# Patient Record
Sex: FEMALE | Race: BLACK OR AFRICAN AMERICAN | Employment: PART TIME | URBAN - METROPOLITAN AREA
[De-identification: names, ages, dates, MRNs, and addresses within clinical notes are randomized per-mention and may not be internally consistent; named-entity substitution may affect disease eponyms.]

---

## 2017-02-19 ENCOUNTER — HOSPITAL ENCOUNTER (EMERGENCY)
Age: 27
Discharge: HOME | End: 2017-02-19
Attending: EMERGENCY MEDICINE
Payer: COMMERCIAL

## 2017-02-19 VITALS
OXYGEN SATURATION: 99 % | DIASTOLIC BLOOD PRESSURE: 74 MMHG | WEIGHT: 234 LBS | SYSTOLIC BLOOD PRESSURE: 131 MMHG | HEART RATE: 94 BPM | RESPIRATION RATE: 16 BRPM | HEIGHT: 67 IN | BODY MASS INDEX: 36.73 KG/M2 | TEMPERATURE: 97.7 F

## 2017-02-19 DIAGNOSIS — J06.9 ACUTE UPPER RESPIRATORY INFECTION: Primary | ICD-10-CM

## 2017-02-19 DIAGNOSIS — Z34.90 PREGNANCY, UNSPECIFIED GESTATIONAL AGE: ICD-10-CM

## 2017-02-19 LAB
FLUAV AG NPH QL IA: NEGATIVE
FLUBV AG NOSE QL IA: NEGATIVE

## 2017-02-19 PROCEDURE — 87804 INFLUENZA ASSAY W/OPTIC: CPT | Performed by: EMERGENCY MEDICINE

## 2017-02-19 PROCEDURE — 99282 EMERGENCY DEPT VISIT SF MDM: CPT

## 2017-02-19 RX ORDER — DIPHENHYDRAMINE HCL 25 MG
50 CAPSULE ORAL
Qty: 30 CAP | Refills: 0 | Status: SHIPPED | OUTPATIENT
Start: 2017-02-19 | End: 2019-01-26

## 2017-02-19 RX ORDER — ACETAMINOPHEN 325 MG/1
650 TABLET ORAL
Qty: 20 TAB | Refills: 0 | Status: SHIPPED | OUTPATIENT
Start: 2017-02-19 | End: 2017-07-24

## 2017-02-19 NOTE — ED NOTES
Emergency Department Nursing Plan of Care       The Nursing Plan of Care is developed from the Nursing assessment and Emergency Department Attending provider initial evaluation. The plan of care may be reviewed in the ED Provider note.     The Plan of Care was developed with the following considerations:   Patient / Family readiness to learn indicated by:verbalized understanding  Persons(s) to be included in education: patient  Barriers to Learning/Limitations:No    Signed     Magali Marrero RN    2/19/2017   10:13 AM

## 2017-02-19 NOTE — ED NOTES
Discharge instructions were given to the patient by Nanda Koch RN. Patient was given 2 prescriptions and was encouraged to call or return to the ED for worsening issues or problems and was encouraged to schedule a follow up appointment for continuing care. Patient given a current medication reconciliation form and verbalized understanding of their medications and importance of discussing medications at follow-up. The patient verbalized understanding of discharge instructions and prescriptions, all questions were answered. The patient has no further concerns at this time. Patient stable at time of discharge. The patient left the Emergency Department ambulatory, with friend, and in no acute distress. Patient declined wheelchair transport out of Emergency Department.

## 2017-02-19 NOTE — ED PROVIDER NOTES
Patient is a 27 y.o. female presenting with nasal congestion. The history is provided by the patient and medical records. No  was used. Nasal Congestion   This is a new problem. The current episode started more than 2 days ago. The problem occurs constantly. The problem has not changed since onset. Pertinent negatives include no chest pain, no abdominal pain, no headaches and no shortness of breath. The symptoms are aggravated by coughing. Nothing relieves the symptoms. She has tried a warm compress for the symptoms. Pt 4 months pregnant with persistent URI sx, congestion, cough, body aches. No fever. Past Medical History:   Diagnosis Date    Cancer ([facility] [address]      ovarian     Hypertension      with pregnancy    Ill-defined condition      MS    Ill-defined condition      TIMOTHY 1 with last pap       History reviewed. No pertinent past surgical history. History reviewed. No pertinent family history. Social History     Social History    Marital status: SINGLE     Spouse name: N/A    Number of children: N/A    Years of education: N/A     Occupational History    Not on file. Social History Main Topics    Smoking status: Current Some Day Smoker    Smokeless tobacco: Not on file      Comment: Black and milds when she drinks    Alcohol use Yes      Comment: occ    Drug use: No    Sexual activity: Yes     Partners: Male     Birth control/ protection: None     Other Topics Concern    Not on file     Social History Narrative         ALLERGIES: Shellfish derived    Review of Systems   HENT: Positive for congestion. Respiratory: Positive for cough. Negative for shortness of breath. Cardiovascular: Negative for chest pain. Gastrointestinal: Negative for abdominal pain. Musculoskeletal: Positive for back pain. Neurological: Negative for headaches. All other systems reviewed and are negative.       Vitals:    02/19/17 1009   BP: 135/77   Pulse: 97   Resp: 16   Temp: 97.7 °F (36.5 °C)   SpO2: 99%   Weight: 106.1 kg (234 lb)   Height: 5' 7\" (1.702 m)            Physical Exam   Constitutional: She is oriented to person, place, and time. She appears well-developed and well-nourished. No distress. Nontoxic Black female   HENT:   Head: Normocephalic and atraumatic. Right Ear: External ear normal.   Left Ear: External ear normal.   Mouth/Throat: Oropharynx is clear and moist. No oropharyngeal exudate. Nasal congestion   Eyes: Conjunctivae and EOM are normal. Pupils are equal, round, and reactive to light. Right eye exhibits no discharge. Left eye exhibits no discharge. No scleral icterus. Neck: Normal range of motion. Neck supple. No JVD present. No tracheal deviation present. No thyromegaly present. Cardiovascular: Normal rate, regular rhythm and normal heart sounds. No murmur heard. Pulmonary/Chest: Effort normal and breath sounds normal. No stridor. No respiratory distress. She has no wheezes. She has no rales. Abdominal: Soft. There is no tenderness. Gravid     Musculoskeletal: Normal range of motion. She exhibits no edema or tenderness. Lymphadenopathy:     She has no cervical adenopathy. Neurological: She is alert and oriented to person, place, and time. Skin: Skin is warm and dry. No rash noted. She is not diaphoretic. No erythema. No pallor. Psychiatric: She has a normal mood and affect. Her behavior is normal.   Nursing note and vitals reviewed.        [facility]  ED Course       Procedures

## 2017-02-19 NOTE — DISCHARGE INSTRUCTIONS
Upper Respiratory Infection: After Your Visit to the Emergency Room  Your Care Instructions  You were seen in the emergency room for an upper respiratory infection (URI). This is an infection of the nose, sinuses, or throat. Viruses or bacteria can cause URIs. Colds, flu, and sinusitis are examples of URIs. These infections are spread by coughs, sneezes, and close contact with people who have a URI. Your doctor may have given you antibiotics to treat the infection if it was caused by bacteria. But antibiotics will not help a viral infection. You can treat most infections with home care. This may include drinking lots of fluids and taking over-the-counter medicine for your symptoms. Even though you have been released from the emergency room, you still need to watch for any problems. The doctor carefully checked you. But sometimes problems can develop later. If you have new symptoms, or if your symptoms do not get better, return to the emergency room or call your doctor right away. A visit to the emergency room is only one step in your treatment. Even if you feel better, you still need to do what your doctor recommends, such as going to all suggested follow-up appointments and taking medicines exactly as directed. This will help you recover and help prevent future problems. How can you care for yourself at home? · To prevent dehydration, drink plenty of fluids, enough so that your urine is light yellow or clear like water. Choose water and other caffeine-free clear liquids until you feel better. If you have kidney, heart, or liver disease and have to limit fluids, talk with your doctor before you increase the amount of fluids you drink. · Take acetaminophen (Tylenol) or ibuprofen (Advil, Motrin) for fever or pain. Read and follow all instructions on the label. · If your doctor prescribed antibiotics, take them as directed. Do not stop taking them just because you feel better.  You need to take the full course of antibiotics. · Take cough medicine and a decongestant if your doctor suggests it. · Get plenty of rest.  · Use saline (saltwater) nasal washes to help keep your nasal passages open and wash out mucus and bacteria. You can buy saline nose drops at a grocery store or drugstore. Or you can make your own at home by adding 1 teaspoon of salt and 1 teaspoon of baking soda to 2 cups of distilled water. If you make your own, fill a bulb syringe with the solution, insert the tip into your nostril, and squeeze gently. Helon Cindy your nose. · Use a vaporizer or humidifier to add moisture to the air in your bedroom. Follow the instructions for cleaning it. · Do not smoke or allow others to smoke around you. If you need help quitting, talk to your doctor about stop-smoking programs and medicines. These can increase your chances of quitting for good. When should you call for help? Call 911 if:  · You have severe trouble breathing. Return to the emergency room now if:  · You have a fever with stiff neck or a severe headache. · You have signs of needing more fluids. You have sunken eyes, a dry mouth, and pass only a little dark urine. · You cannot keep down fluids or medicine. Call your doctor today if:  · You have a deep cough and a lot of mucus. · You are too tired to eat or drink. · You have a new symptom, such as a sore throat, an earache, or a rash. Where can you learn more? Go to Zawatt.be  Enter M448 in the search box to learn more about \"Upper Respiratory Infection: After Your Visit to the Emergency Room. \"   © 0699-7214 Healthwise, [employer]. Care instructions adapted under license by [facility] (which disclaims liability or warranty for this information).  This care instruction is for use with your licensed healthcare professional. If you have questions about a medical condition or this instruction, always ask your healthcare professional. [address] any warranty or liability for your use of this information.   Content Version: 9.3.86315; Last Revised: February 13, 2012

## 2017-04-11 ENCOUNTER — HOSPITAL ENCOUNTER (EMERGENCY)
Age: 27
Discharge: HOME | End: 2017-04-11
Attending: EMERGENCY MEDICINE
Payer: COMMERCIAL

## 2017-04-11 VITALS
WEIGHT: 243 LBS | DIASTOLIC BLOOD PRESSURE: 75 MMHG | SYSTOLIC BLOOD PRESSURE: 129 MMHG | BODY MASS INDEX: 38.14 KG/M2 | HEIGHT: 67 IN | HEART RATE: 93 BPM | RESPIRATION RATE: 22 BRPM | OXYGEN SATURATION: 99 %

## 2017-04-11 DIAGNOSIS — O23.42 UTI IN PREGNANCY, SECOND TRIMESTER: Primary | ICD-10-CM

## 2017-04-11 LAB
APPEARANCE UR: ABNORMAL
BACTERIA URNS QL MICRO: ABNORMAL /HPF
BILIRUB UR QL: NEGATIVE
COLOR UR: ABNORMAL
EPITH CASTS URNS QL MICRO: ABNORMAL /LPF
GLUCOSE UR STRIP.AUTO-MCNC: NEGATIVE MG/DL
HCG UR QL: POSITIVE
HGB UR QL STRIP: ABNORMAL
KETONES UR QL STRIP.AUTO: NEGATIVE MG/DL
LEUKOCYTE ESTERASE UR QL STRIP.AUTO: ABNORMAL
NITRITE UR QL STRIP.AUTO: NEGATIVE
PH UR STRIP: 7.5 [PH] (ref 5–8)
PROT UR STRIP-MCNC: 100 MG/DL
RBC #/AREA URNS HPF: ABNORMAL /HPF (ref 0–5)
SP GR UR REFRACTOMETRY: 1.01 (ref 1–1.03)
UA: UC IF INDICATED,UAUC: ABNORMAL
UROBILINOGEN UR QL STRIP.AUTO: 0.2 EU/DL (ref 0.2–1)
WBC URNS QL MICRO: ABNORMAL /HPF (ref 0–4)

## 2017-04-11 PROCEDURE — 87086 URINE CULTURE/COLONY COUNT: CPT | Performed by: EMERGENCY MEDICINE

## 2017-04-11 PROCEDURE — 81001 URINALYSIS AUTO W/SCOPE: CPT | Performed by: EMERGENCY MEDICINE

## 2017-04-11 PROCEDURE — 81025 URINE PREGNANCY TEST: CPT

## 2017-04-11 PROCEDURE — 99284 EMERGENCY DEPT VISIT MOD MDM: CPT

## 2017-04-11 PROCEDURE — 87077 CULTURE AEROBIC IDENTIFY: CPT | Performed by: EMERGENCY MEDICINE

## 2017-04-11 PROCEDURE — 87186 SC STD MICRODIL/AGAR DIL: CPT | Performed by: EMERGENCY MEDICINE

## 2017-04-11 RX ORDER — CEPHALEXIN 500 MG/1
500 CAPSULE ORAL 2 TIMES DAILY
Qty: 14 CAP | Refills: 0 | Status: SHIPPED | OUTPATIENT
Start: 2017-04-11 | End: 2017-04-18

## 2017-04-11 RX ORDER — BISMUTH SUBSALICYLATE 262 MG
1 TABLET,CHEWABLE ORAL DAILY
Status: ON HOLD | COMMUNITY
End: 2019-06-13

## 2017-04-11 RX ORDER — LANOLIN ALCOHOL/MO/W.PET/CERES
CREAM (GRAM) TOPICAL
Status: ON HOLD | COMMUNITY
End: 2019-06-13

## 2017-04-11 NOTE — ED PROVIDER NOTES
Patient is a 27 y.o. female presenting with abdominal pain. The history is provided by the patient and medical records. No  was used. Abdominal Pain    This is a new problem. The current episode started yesterday. The problem has not changed since onset. The pain is mild. Associated symptoms include frequency. Nothing worsens the pain. 32 week pregnant female, pt of Dr London Araujo, to deliver at [facility]. Previous pregnancy 4 weeks early. Pt states she began having suprapubic and low back cramping in 5 minute intervals last night. Eased up today but having frequency, hesitancy, urgency. \"Came here because it was closer. \"    Past Medical History:   Diagnosis Date    Cancer ([facility] [address]     ovarian     Hypertension     with pregnancy    Ill-defined condition     MS    Ill-defined condition     TIMOTHY 1 with last pap       History reviewed. No pertinent surgical history. History reviewed. No pertinent family history. Social History     Social History    Marital status: SINGLE     Spouse name: N/A    Number of children: N/A    Years of education: N/A     Occupational History    Not on file. Social History Main Topics    Smoking status: Current Some Day Smoker    Smokeless tobacco: Not on file      Comment: not since being pregnant    Alcohol use No      Comment: occ    Drug use: No    Sexual activity: Yes     Partners: Male     Birth control/ protection: None     Other Topics Concern    Not on file     Social History Narrative         ALLERGIES: Shellfish derived    Review of Systems   Gastrointestinal: Positive for abdominal pain. Genitourinary: Positive for frequency and urgency. All other systems reviewed and are negative. Vitals:    04/11/17 0658   BP: 129/75   Pulse: 93   Resp: 22   SpO2: 99%   Weight: 110.2 kg (243 lb)   Height: 5' 7\" (1.702 m)            Physical Exam   Constitutional: She is oriented to person, place, and time.  She appears well-developed and well-nourished. [de-identified] Black female here with male    HENT:   Head: Normocephalic and atraumatic. Nose: Nose normal.   Mouth/Throat: Oropharynx is clear and moist. No oropharyngeal exudate. Eyes: Conjunctivae and EOM are normal. Pupils are equal, round, and reactive to light. Right eye exhibits no discharge. Left eye exhibits no discharge. No scleral icterus. Neck: Normal range of motion. Neck supple. Cardiovascular: Normal rate, regular rhythm and normal heart sounds. Pulmonary/Chest: Effort normal. No respiratory distress. Abdominal: Soft. Bowel sounds are normal. She exhibits no distension. There is no tenderness. Gravid, tender suprapubic   Genitourinary:   Genitourinary Comments: Cervix thick, closed   Musculoskeletal: Normal range of motion. Neurological: She is alert and oriented to person, place, and time. Skin: Skin is warm and dry. She is not diaphoretic. Psychiatric: She has a normal mood and affect. Her behavior is normal.   Nursing note and vitals reviewed.        MDM  ED Course       Procedures

## 2017-04-11 NOTE — ED NOTES
....Discharge summary and discharge medications reviewed with patient and spouse and appropriate educational materials and side effects teaching were provided. patient  Given 1 paper prescriptions and 0 electronic prescriptions sent to pt's listed pharmacy. Patient (s) verbalized understanding of the importance of discussing medications with his or her physician or clinic they will be following up with. No si/s of acute distress prior to discharge. Patient offered wheelchair from treatment area to hospital entrance, patient refused wheelchair. Pt reported 8/10 abd and back pain. No other pt complaints. Pt discharged with pt's significant other.

## 2017-04-11 NOTE — ED NOTES
..  Emergency Department Nursing Plan of Care       The Nursing Plan of Care is developed from the Nursing assessment and Emergency Department Attending provider initial evaluation. The plan of care may be reviewed in the ED Provider note. The Plan of Care was developed with the following considerations:   Patient / Family readiness to learn indicated by:verbalized understanding and appropriate questions asked  Persons(s) to be included in education: patient  Barriers to Learning/Limitations:No    Signed     Grey Jones RN    4/11/2017   7:25 AM    .. Patient verbalized name and date of birth. Name and date of birth compared to chart to validate information. Patient verbalized that his/her armband contains the correct spelling of name and date of birth.

## 2017-04-11 NOTE — DISCHARGE INSTRUCTIONS
Belly Pain in Pregnancy: Care Instructions  Your Care Instructions  When you're pregnant, any belly pain can be a worry. You may not want to call your doctor about every pain you have. But you don't want to miss something that is dangerous for you or your baby. Even if it feels familiar, belly pain can mean something new when you're pregnant. It's important to know when to call your doctor. It will also help to know how to care for yourself at home when your pain is not caused by anything harmful. · When belly pain is more severe or constant, see a doctor right away. · If you're sure your belly pain is a sign of labor, call your doctor. · When belly pain is brief, it's usually a normal part of pregnancy. It might be related to changes in the growing uterus. Or it could be the stretching of ligaments called round ligaments. These ligaments help support the uterus. Round ligament pain can be on either side of your belly. It can also be felt in your hips or groin. Follow-up care is a key part of your treatment and safety. Be sure to make and go to all appointments, and call your doctor if you are having problems. It's also a good idea to know your test results and keep a list of the medicines you take. How can you tell if belly pain is a sign of labor? When belly pain is caused by labor, it can feel like mild or menstrual-like cramps in your lower belly. These cramps are probably contractions. They can happen in your second or third trimester. You may also have:  · A steady, dull ache in your lower back, pelvis, or thighs. · A feeling of pressure in your pelvis or lower belly. · Changes in your vaginal discharge or a sudden release of fluid from the vagina. If you think you are in labor, call your doctor. How can you care for yourself at home? When belly pain is mild and is not a symptom of labor:  · Rest until you feel better. · Take a warm bath.   · Think about what you drink and eat:  ¨ Drink plenty of fluids. Choose water and other caffeine-free clear liquids until you feel better. ¨ Try eating small, frequent meals. If your stomach is upset, try bland, low-fat foods like plain rice, broiled chicken, toast, and yogurt. · Think about how you move if you are having brief pains from stretching of the round ligaments. ¨ Try gentle stretching. ¨ Move a little more slowly when turning in bed or getting up from a chair, so those ligaments don't stretch quickly. ¨ Lean forward a bit if you think you are going to cough or sneeze. When should you call for help? Call 911 anytime you think you may need emergency care. For example, call if:  · You have sudden, severe pain in your belly. · You have severe vaginal bleeding. Call your doctor now or seek immediate medical care if:  · You have new or worse belly pain or cramping. · You have any vaginal bleeding. · You have a fever. · You have symptoms of preeclampsia, such as:  ¨ Sudden swelling of your face, hands, or feet. ¨ New vision problems (such as dimness or blurring). ¨ A severe headache. · You think that you may be in labor. This means that you've had at least 8 contractions within 1 hour or at least 4 contractions within 20 minutes, even after you change your position and drink fluids. · You have symptoms of a urinary tract infection. These may include:  ¨ Pain or burning when you urinate. ¨ A frequent need to urinate without being able to pass much urine. ¨ Pain in the flank, which is just below the rib cage and above the waist on either side of the back. ¨ Blood in your urine. Watch closely for changes in your health, and be sure to contact your doctor if you are worried about your or your baby's health. Where can you learn more? Go to http://danielle-irais.info/. Enter 041 [id number] 207 in the search box to learn more about \"Belly Pain in Pregnancy: Care Instructions. \"  Current as of: June 8, 2016  Content Version: 11.2  © 0980-3333 [employer]. Care instructions adapted under license by [employer] (which disclaims liability or warranty for this information). If you have questions about a medical condition or this instruction, always ask your healthcare professional. [place] [address] any warranty or liability for your use of this information. Urinary Tract Infection in Women: Care Instructions  Your Care Instructions    A urinary tract infection, or UTI, is a general term for an infection anywhere between the kidneys and the urethra (where urine comes out). Most UTIs are bladder infections. They often cause pain or burning when you urinate. UTIs are caused by bacteria and can be cured with antibiotics. Be sure to complete your treatment so that the infection goes away. Follow-up care is a key part of your treatment and safety. Be sure to make and go to all appointments, and call your doctor if you are having problems. It's also a good idea to know your test results and keep a list of the medicines you take. How can you care for yourself at home? · Take your antibiotics as directed. Do not stop taking them just because you feel better. You need to take the full course of antibiotics. · Drink extra water and other fluids for the next day or two. This may help wash out the bacteria that are causing the infection. (If you have kidney, heart, or liver disease and have to limit fluids, talk with your doctor before you increase your fluid intake.)  · Avoid drinks that are carbonated or have caffeine. They can irritate the bladder. · Urinate often. Try to empty your bladder each time. · To relieve pain, take a hot bath or lay a heating pad set on low over your lower belly or genital area. Never go to sleep with a heating pad in place. To prevent UTIs  · Drink plenty of water each day. This helps you urinate often, which clears bacteria from your system.  (If you have kidney, heart, or liver disease and have to limit fluids, talk with your doctor before you increase your fluid intake.)  · Urinate when you need to. · Urinate right after you have sex. · Change sanitary pads often. · Avoid douches, bubble baths, feminine hygiene sprays, and other feminine hygiene products that have deodorants. · After going to the bathroom, wipe from front to back. When should you call for help? Call your doctor now or seek immediate medical care if:  · Symptoms such as fever, chills, nausea, or vomiting get worse or appear for the first time. · You have new pain in your back just below your rib cage. This is called flank pain. · There is new blood or pus in your urine. · You have any problems with your antibiotic medicine. Watch closely for changes in your health, and be sure to contact your doctor if:  · You are not getting better after taking an antibiotic for 2 days. · Your symptoms go away but then come back. Where can you learn more? Go to http://danielle-irais.info/. Enter C844 in the search box to learn more about \"Urinary Tract Infection in Women: Care Instructions. \"  Current as of: November 28, 2016  Content Version: 11.2  © 4495-2450 [employer]. Care instructions adapted under license by [employer] (which disclaims liability or warranty for this information). If you have questions about a medical condition or this instruction, always ask your healthcare professional. [place] [address] any warranty or liability for your use of this information.

## 2017-04-13 LAB
BACTERIA SPEC CULT: ABNORMAL
CC UR VC: ABNORMAL
SERVICE CMNT-IMP: ABNORMAL

## 2017-06-23 ENCOUNTER — OFFICE VISIT (OUTPATIENT)
Dept: SLEEP MEDICINE | Age: 27
End: 2017-06-23

## 2017-06-23 VITALS
HEIGHT: 67 IN | OXYGEN SATURATION: 96 % | HEART RATE: 81 BPM | WEIGHT: 243 LBS | DIASTOLIC BLOOD PRESSURE: 81 MMHG | SYSTOLIC BLOOD PRESSURE: 123 MMHG | BODY MASS INDEX: 38.14 KG/M2

## 2017-06-23 DIAGNOSIS — G47.33 OSA (OBSTRUCTIVE SLEEP APNEA): Primary | ICD-10-CM

## 2017-06-23 DIAGNOSIS — I10 ESSENTIAL HYPERTENSION: ICD-10-CM

## 2017-06-23 DIAGNOSIS — Z3A.39 39 WEEKS GESTATION OF PREGNANCY: ICD-10-CM

## 2017-06-23 RX ORDER — ACYCLOVIR 400 MG/1
TABLET ORAL
Refills: 4 | Status: ON HOLD | COMMUNITY
Start: 2017-06-07 | End: 2019-06-13

## 2017-06-23 RX ORDER — FAMOTIDINE 40 MG/1
TABLET, FILM COATED ORAL
Refills: 3 | COMMUNITY
Start: 2017-05-24 | End: 2017-07-24

## 2017-06-23 RX ORDER — SWAB
1 SWAB, NON-MEDICATED MISCELLANEOUS DAILY
Status: ON HOLD | COMMUNITY
End: 2019-06-13

## 2017-06-23 NOTE — PATIENT INSTRUCTIONS
217 [facility] [place]. [place], [address]  Tel.  [phone]  Fax. 100 [facility] 60  [place], [address] [facility]  Tel.  [phone]  Fax. [phone] [address]  Tel.  [phone]  Fax. [phone]     Sleep Apnea: After Your Visit  Your Care Instructions  Sleep apnea occurs when you frequently stop breathing for 10 seconds or longer during sleep. It can be mild to severe, based on the number of times per hour that you stop breathing or have slowed breathing. Blocked or narrowed airways in your nose, mouth, or throat can cause sleep apnea. Your airway can become blocked when your throat muscles and tongue relax during sleep. Sleep apnea is common, occurring in 1 out of 20 individuals. Individuals having any of the following characteristics should be evaluated and treated right away due to high risk and detrimental consequences from untreated sleep apnea:  1. Obesity  2. Congestive Heart failure  3. Atrial Fibrillation  4. Uncontrolled Hypertension  5. Type II Diabetes  6. Night-time Arrhythmias  7. Stroke  8. Pulmonary Hypertension  9. High-risk Driving Populations (pilots, truck drivers, etc.)  10. Patients Considering Weight-loss Surgery    How do you know you have sleep apnea? You probably have sleep apnea if you answer 'yes' to 3 or more of the following questions:  S - Have you been told that you Snore? T - Are you often Tired during the day? O - Has anyone Observed you stop breathing while sleeping? P- Do you have (or are being treated for) high blood Pressure? B - Are you obese (Body Mass Index > 35)? A - Is your Age 48years old or older? N - Is your Neck size greater than 16 inches? G - Are you male Gender? A sleep physician can prescribe a breathing device that prevents tissues in the throat from blocking your airway.  Or your doctor may recommend using a dental device (oral breathing device) to help keep your airway open. In some cases, surgery may be needed to remove enlarged tissues in the throat. Follow-up care is a key part of your treatment and safety. Be sure to make and go to all appointments, and call your doctor if you are having problems. It's also a good idea to know your test results and keep a list of the medicines you take. How can you care for yourself at home? · Lose weight, if needed. It may reduce the number of times you stop breathing or have slowed breathing. · Go to bed at the same time every night. · Sleep on your side. It may stop mild apnea. If you tend to roll onto your back, sew a pocket in the back of your pajama top. Put a tennis ball into the pocket, and stitch the pocket shut. This will help keep you from sleeping on your back. · Avoid alcohol and medicines such as sleeping pills and sedatives before bed. · Do not smoke. Smoking can make sleep apnea worse. If you need help quitting, talk to your doctor about stop-smoking programs and medicines. These can increase your chances of quitting for good. · Prop up the head of your bed 4 to 6 inches by putting bricks under the legs of the bed. · Treat breathing problems, such as a stuffy nose, caused by a cold or allergies. · Use a continuous positive airway pressure (CPAP) breathing machine if lifestyle changes do not help your apnea and your doctor recommends it. The machine keeps your airway from closing when you sleep. · If CPAP does not help you, ask your doctor whether you should try other breathing machines. A bilevel positive airway pressure machine has two types of air pressureâone for breathing in and one for breathing out. Another device raises or lowers air pressure as needed while you breathe. · If your nose feels dry or bleeds when using one of these machines, talk with your doctor about increasing moisture in the air. A humidifier may help.   · If your nose is runny or stuffy from using a breathing machine, talk with your doctor about using decongestants or a corticosteroid nasal spray. When should you call for help? Watch closely for changes in your health, and be sure to contact your doctor if:  · You still have sleep apnea even though you have made lifestyle changes. · You are thinking of trying a device such as CPAP. · You are having problems using a CPAP or similar machine. Where can you learn more? Go to [employer]. Enter T973 in the search box to learn more about \"Sleep Apnea: After Your Visit. \"   © 9024-0227 Healthwise, [employer]. Care instructions adapted under license by [employer] (which disclaims liability or warranty for this information). This care instruction is for use with your licensed healthcare professional. If you have questions about a medical condition or this instruction, always ask your healthcare professional. Dinoarh Amend any warranty or liability for your use of this information. PROPER SLEEP HYGIENE    What to avoid  · Do not have drinks with caffeine, such as coffee or black tea, for 8 hours before bed. · Do not smoke or use other types of tobacco near bedtime. Nicotine is a stimulant and can keep you awake. · Avoid drinking alcohol late in the evening, because it can cause you to wake in the middle of the night. · Do not eat a big meal close to bedtime. If you are hungry, eat a light snack. · Do not drink a lot of water close to bedtime, because the need to urinate may wake you up during the night. · Do not read or watch TV in bed. Use the bed only for sleeping and sexual activity. What to try  · Go to bed at the same time every night, and wake up at the same time every morning. Do not take naps during the day. · Keep your bedroom quiet, dark, and cool. · Get regular exercise, but not within 3 to 4 hours of your bedtime. .  · Sleep on a comfortable pillow and mattress.   · If watching the clock makes you anxious, turn it facing away from you so you cannot see the time. · If you worry when you lie down, start a worry book. Well before bedtime, write down your worries, and then set the book and your concerns aside. · Try meditation or other relaxation techniques before you go to bed. · If you cannot fall asleep, get up and go to another room until you feel sleepy. Do something relaxing. Repeat your bedtime routine before you go to bed again. · Make your house quiet and calm about an hour before bedtime. Turn down the lights, turn off the TV, log off the computer, and turn down the volume on music. This can help you relax after a busy day. Drowsy Driving  The [address] [place] [address] Road Traffic Safety Administration cites drowsiness as a causing factor in more than 559,673 police reported crashes annually, resulting in 76,000 injuries and 1,500 deaths. Other surveys suggest 55% of people polled have driven while drowsy in the past year, 23% had fallen asleep but not crashed, 3% crashed, and 2% had and accident due to drowsy driving. Who is at risk? Young Drivers: One study of drowsy driving accidents states that 55% of the drivers were under 25 years. Of those, 75% were male. Shift Workers and Travelers: People who work overnight or travel across time zones frequently are at higher risk of experiencing Circadian Rhythm Disorders. They are trying to work and function when their body is programed to sleep. Sleep Deprived: Lack of sleep has a serious impact on your ability to pay attention or focus on a task. Consistently getting less than the average of 8 hours your body needs creates partial or cumulative sleep deprivation. Untreated Sleep Disorders: Sleep Apnea, Narcolepsy, R.L.S., and other sleep disorders (untreated) prevent a person from getting enough restful sleep. This leads to excessive daytime sleepiness and increases the risk for drowsy driving accidents by up to 7 times.   Medications / Alcohol: Even over the counter medications can cause drowsiness. Medications that impair a drivers attention should have a warning label. Alcohol naturally makes you sleepy and on its own can cause accidents. Combined with excessive drowsiness its effects are amplified. Signs of Drowsy Driving:   * You don't remember driving the last few miles   * You may drift out of your nils   * You are unable to focus and your thoughts wander   * You may yawn more often than normal   * You have difficulty keeping your eyes open / nodding off   * Missing traffic signs, speeding, or tailgating  Prevention-   Good sleep hygiene, lifestyle and behavioral choices have the most impact on drowsy driving. There is no substitute for sleep and the average person requires 8 hours nightly. If you find yourself driving drowsy, stop and sleep. Consider the sleep hygiene tips provided during your visit as well. Medication Refill Policy: Refills for all medications require 1 week advance notice. Please have your pharmacy fax a refill request. We are unable to fax, or call in \"controled substance\" medications and you will need to pick these prescriptions up from our office. uFaber Activation    Thank you for requesting access to uFaber. Please follow the instructions below to securely access and download your online medical record. uFaber allows you to send messages to your doctor, view your test results, renew your prescriptions, schedule appointments, and more. How Do I Sign Up? 1. In your internet browser, go to [email or web address]. 2. Click on the First Time User? Click Here link in the Sign In box. You will see the New Member Sign Up page. 3. Enter your uFaber Access Code exactly as it appears below. You will not need to use this code after youve completed the sign-up process. If you do not sign up before the expiration date, you must request a new code.     uFaber Access Code: [email or web address]  Expires: 9/21/2017 11:26 AM (This is the date your Podio access code will )    4. Enter the last four digits of your Social Security Number (xxxx) and Date of Birth (mm/dd/yyyy) as indicated and click Submit. You will be taken to the next sign-up page. 5. Create a Protek-dort ID. This will be your Podio login ID and cannot be changed, so think of one that is secure and easy to remember. 6. Create a Podio password. You can change your password at any time. 7. Enter your Password Reset Question and Answer. This can be used at a later time if you forget your password. 8. Enter your e-mail address. You will receive e-mail notification when new information is available in [address] 9. Click Sign Up. You can now view and download portions of your medical record. 10. Click the Download Summary menu link to download a portable copy of your medical information. Additional Information    If you have questions, please call [phone]. Remember, Podio is NOT to be used for urgent needs. For medical emergencies, dial 911.

## 2017-06-23 NOTE — PROGRESS NOTES
217 [facility] [place]. [place], [address]  Tel.  [phone]  Fax. 100 [facility] 60  [place], 200 [address] [facility]  Tel.  [phone]  Fax. [phone] 9250 [facility]   Tel.  [phone]  Fax. [phone]         Subjective:      Nikos Hart is an 27 y.o. female referred for evaluation for a sleep disorder. She complains of snoring associated with snorting, periods of not breathing, excessive daytime sleepiness. Symptoms began several years ago, she was diagnosed with YAEL at age 11 and has been on CPAP therapy since that time. Use became sporadic overtime as she felt better and therapy was discontinued completely in January of 2017. She usually can fall asleep in 10 minutes. Family or house members note snoring, snorting, periods of not breathing. She denies completely or partially paralyzed while falling asleep or waking up. Nikos Hart does wake up frequently at night. She is bothered by waking up too early and left unable to get back to sleep. She actually sleeps about 5 hours at night and wakes up about 5 times during the night. She   work shifts:  .   Veverly Maxime indicates she does get too little sleep at night. Her bedtime is 2200. She awakens at 0200. She does take naps. She takes 4 naps a week lasting 30 to 45. She has the following observed behaviors: Loud snoring, Pauses in breathing, Biting tongue, Grinding teeth;  . Other remarks: waking with gasp    [place] Sleepiness Score: 10 which reflect moderate daytime drowsiness. Allergies   Allergen Reactions    Shellfish Derived Swelling         Current Outpatient Prescriptions:     acyclovir (ZOVIRAX) 400 mg tablet, TAKE 1 TABLET BY MOUTH TWICE A DAY, Disp: , Rfl: 4    famotidine (PEPCID) 40 mg tablet, TAKE ONE TABLET BY MOUTH TWICE A DAY, Disp: , Rfl: 3    prenatal vit-iron fumarate-fa (PRENATAL PLUS WITH IRON) 28 mg iron- 800 mcg tab, Take 1 Tab by mouth daily. , Disp: , Rfl:     ferrous sulfate (IRON) 325 mg (65 mg iron) tablet, Take  by mouth Daily (before breakfast). , Disp: , Rfl:     multivitamin (ONE A DAY) tablet, Take 1 Tab by mouth daily. , Disp: , Rfl:     acetaminophen (TYLENOL) 325 mg tablet, Take 2 Tabs by mouth every four (4) hours as needed for Pain., Disp: 20 Tab, Rfl: 0    diphenhydrAMINE (BENADRYL) 25 mg capsule, Take 2 Caps by mouth every six (6) hours as needed. , Disp: 30 Cap, Rfl: 0     She  has a past medical history of Cancer ([facility] [address] Hypertension; Ill-defined condition; and Ill-defined condition. She  has no past surgical history on file. She family history includes Cancer in her father; Kidney Disease in her mother. She  reports that she has been smoking. She has never used smokeless tobacco. She reports that she does not drink alcohol or use illicit drugs. Review of Systems:  Constitutional: significant  weight gain - attributed to pregnancy  Eyes:  No blurred vision  CVS:  No significant chest pain  Pulm: significant shortness of breath  GI:  No significant nausea or vomiting  :  significant nocturia  Musculoskeletal:  No significant joint pain at night  Skin:  No significant rashes  Neuro:  No significant dizziness   Psych:  No active mood issues    Sleep Review of Systems: notable for no difficulty falling asleep; frequent awakenings at night;  regular dreaming noted; no nightmares ; early morning headaches; no memory problems;  concentration issues; no history of any automobile or occupational accidents due to daytime drowsiness.       Objective:     Visit Vitals    /81    Pulse 81    Ht 5' 7\" (1.702 m)    Wt 243 lb (110.2 kg)    LMP 09/14/2016    SpO2 96%    BMI 38.06 kg/m2         General:   Not in acute distress   Eyes:  Anicteric sclerae, no obvious strabismus   Nose:  No obvious nasal septum deviation    Oropharynx:   Class 4 oropharyngeal outlet, thick tongue base, uvula could not be seen due to low-lying soft palate, narrow tonsilo-pharyngeal pilars   Tonsils:   tonsils are not seen due to low-lying soft palate   Neck:   Neck circ. in \"inches\": 13.5; midline trachea   Chest/Lungs:  Equal lung expansion, clear on auscultation    CVS:  Normal rate, regular rhythm; no JVD   Skin:  Warm to touch; no obvious rashes   Neuro:  No focal deficits ; no obvious tremor    Psych:  Normal affect,  normal countenance;          Assessment:       ICD-10-CM ICD-9-CM    1. YAEL (obstructive sleep apnea) G47.33 327.23 POLYSOMNOGRAPHY 1 NIGHT   2. 39 weeks gestation of pregnancy Z3A.39 V22.2    3. Essential hypertension I10 401.9    4. BMI 38.0-38.9,adult Z68.38 V85.38          Plan:     * The patient currently has a Moderate Risk for having sleep apnea. STOP-BANG score 5.  * Sleep testing was ordered for initial evaluation. * She was provided information on sleep apnea including coresponding risk factors and the importance of proper treatment. * Treatment options if indicated were reviewed today. Patient agrees to a trial of PAP therapy if indicated. * Counseling was provided regarding proper sleep hygiene (including effect of light on sleep) and safe driving. * Effect of sleep disturbance on weight was reviewed. We have recommended a dedicated weight loss through appropriate diet and an exercise regiment as significant weight reduction has been shown to reduce severity of obstructive sleep apnea. * Telephone [phone]  follow-up shortly after sleep study to review results and plan final management.     (patient has given permission for a message to be left regarding test results and further management if patient cannot be cannot be reached directly). Thank you for allowing us to participate in your patient's medical care. We'll keep you updated on these investigations. Kandi Lua MD, FAASM  Electronically signed.  June 23, 2017

## 2017-07-24 ENCOUNTER — HOSPITAL ENCOUNTER (EMERGENCY)
Age: 27
Discharge: HOME | End: 2017-07-24
Attending: INTERNAL MEDICINE
Payer: COMMERCIAL

## 2017-07-24 ENCOUNTER — APPOINTMENT (OUTPATIENT)
Dept: CT IMAGING | Age: 27
End: 2017-07-24
Attending: INTERNAL MEDICINE
Payer: COMMERCIAL

## 2017-07-24 VITALS
DIASTOLIC BLOOD PRESSURE: 99 MMHG | OXYGEN SATURATION: 100 % | RESPIRATION RATE: 14 BRPM | BODY MASS INDEX: 37.83 KG/M2 | TEMPERATURE: 98.2 F | SYSTOLIC BLOOD PRESSURE: 159 MMHG | HEART RATE: 62 BPM | HEIGHT: 67 IN | WEIGHT: 241 LBS

## 2017-07-24 DIAGNOSIS — R07.89 ATYPICAL CHEST PAIN: Primary | ICD-10-CM

## 2017-07-24 DIAGNOSIS — K21.9 GASTROESOPHAGEAL REFLUX DISEASE WITHOUT ESOPHAGITIS: ICD-10-CM

## 2017-07-24 LAB
ALBUMIN SERPL BCP-MCNC: 3.2 G/DL (ref 3.5–5)
ALBUMIN/GLOB SERPL: 0.7 {RATIO} (ref 1.1–2.2)
ALP SERPL-CCNC: 191 U/L (ref 45–117)
ALT SERPL-CCNC: 31 U/L (ref 12–78)
ANION GAP BLD CALC-SCNC: 11 MMOL/L (ref 5–15)
APPEARANCE UR: CLEAR
AST SERPL W P-5'-P-CCNC: 64 U/L (ref 15–37)
BACTERIA URNS QL MICRO: NEGATIVE /HPF
BASOPHILS # BLD AUTO: 0 K/UL (ref 0–0.1)
BASOPHILS # BLD: 0 % (ref 0–1)
BILIRUB SERPL-MCNC: 0.3 MG/DL (ref 0.2–1)
BILIRUB UR QL: NEGATIVE
BUN SERPL-MCNC: 6 MG/DL (ref 6–20)
BUN/CREAT SERPL: 8 (ref 12–20)
CALCIUM SERPL-MCNC: 9.2 MG/DL (ref 8.5–10.1)
CHLORIDE SERPL-SCNC: 104 MMOL/L (ref 97–108)
CK SERPL-CCNC: 74 U/L (ref 26–192)
CO2 SERPL-SCNC: 26 MMOL/L (ref 21–32)
COLOR UR: ABNORMAL
CREAT SERPL-MCNC: 0.73 MG/DL (ref 0.55–1.02)
D DIMER PPP FEU-MCNC: 2.6 MG/L FEU (ref 0–0.65)
EOSINOPHIL # BLD: 0.1 K/UL (ref 0–0.4)
EOSINOPHIL NFR BLD: 1 % (ref 0–7)
EPITH CASTS URNS QL MICRO: NORMAL /LPF
ERYTHROCYTE [DISTWIDTH] IN BLOOD BY AUTOMATED COUNT: 14.3 % (ref 11.5–14.5)
GLOBULIN SER CALC-MCNC: 4.7 G/DL (ref 2–4)
GLUCOSE SERPL-MCNC: 102 MG/DL (ref 65–100)
GLUCOSE UR STRIP.AUTO-MCNC: NEGATIVE MG/DL
HCG UR QL: NEGATIVE
HCT VFR BLD AUTO: 38.2 % (ref 35–47)
HGB BLD-MCNC: 12.6 G/DL (ref 11.5–16)
HGB UR QL STRIP: ABNORMAL
INR PPP: 1 (ref 0.9–1.1)
KETONES UR QL STRIP.AUTO: NEGATIVE MG/DL
LEUKOCYTE ESTERASE UR QL STRIP.AUTO: ABNORMAL
LYMPHOCYTES # BLD AUTO: 18 % (ref 12–49)
LYMPHOCYTES # BLD: 2.3 K/UL (ref 0.8–3.5)
MCH RBC QN AUTO: 27.3 PG (ref 26–34)
MCHC RBC AUTO-ENTMCNC: 33 G/DL (ref 30–36.5)
MCV RBC AUTO: 82.7 FL (ref 80–99)
MONOCYTES # BLD: 0.9 K/UL (ref 0–1)
MONOCYTES NFR BLD AUTO: 7 % (ref 5–13)
NEUTS SEG # BLD: 9.9 K/UL (ref 1.8–8)
NEUTS SEG NFR BLD AUTO: 74 % (ref 32–75)
NITRITE UR QL STRIP.AUTO: NEGATIVE
PH UR STRIP: 7 [PH] (ref 5–8)
PLATELET # BLD AUTO: 346 K/UL (ref 150–400)
POTASSIUM SERPL-SCNC: 3.5 MMOL/L (ref 3.5–5.1)
PROT SERPL-MCNC: 7.9 G/DL (ref 6.4–8.2)
PROT UR STRIP-MCNC: NEGATIVE MG/DL
PROTHROMBIN TIME: 10 SEC (ref 9–11.1)
RBC # BLD AUTO: 4.62 M/UL (ref 3.8–5.2)
RBC #/AREA URNS HPF: NORMAL /HPF (ref 0–5)
SODIUM SERPL-SCNC: 141 MMOL/L (ref 136–145)
SP GR UR REFRACTOMETRY: 1.01 (ref 1–1.03)
TROPONIN I SERPL-MCNC: <0.04 NG/ML
UROBILINOGEN UR QL STRIP.AUTO: 1 EU/DL (ref 0.2–1)
WBC # BLD AUTO: 13.2 K/UL (ref 3.6–11)
WBC URNS QL MICRO: NORMAL /HPF (ref 0–4)

## 2017-07-24 PROCEDURE — 85379 FIBRIN DEGRADATION QUANT: CPT | Performed by: INTERNAL MEDICINE

## 2017-07-24 PROCEDURE — 84484 ASSAY OF TROPONIN QUANT: CPT | Performed by: INTERNAL MEDICINE

## 2017-07-24 PROCEDURE — 74011250637 HC RX REV CODE- 250/637: Performed by: INTERNAL MEDICINE

## 2017-07-24 PROCEDURE — 36415 COLL VENOUS BLD VENIPUNCTURE: CPT | Performed by: INTERNAL MEDICINE

## 2017-07-24 PROCEDURE — 74011250636 HC RX REV CODE- 250/636: Performed by: INTERNAL MEDICINE

## 2017-07-24 PROCEDURE — 80053 COMPREHEN METABOLIC PANEL: CPT | Performed by: INTERNAL MEDICINE

## 2017-07-24 PROCEDURE — 93005 ELECTROCARDIOGRAM TRACING: CPT

## 2017-07-24 PROCEDURE — 81025 URINE PREGNANCY TEST: CPT | Performed by: INTERNAL MEDICINE

## 2017-07-24 PROCEDURE — 71275 CT ANGIOGRAPHY CHEST: CPT

## 2017-07-24 PROCEDURE — 74011000250 HC RX REV CODE- 250: Performed by: INTERNAL MEDICINE

## 2017-07-24 PROCEDURE — 96374 THER/PROPH/DIAG INJ IV PUSH: CPT

## 2017-07-24 PROCEDURE — 74011636320 HC RX REV CODE- 636/320: Performed by: INTERNAL MEDICINE

## 2017-07-24 PROCEDURE — 81001 URINALYSIS AUTO W/SCOPE: CPT | Performed by: INTERNAL MEDICINE

## 2017-07-24 PROCEDURE — 82550 ASSAY OF CK (CPK): CPT | Performed by: INTERNAL MEDICINE

## 2017-07-24 PROCEDURE — 96375 TX/PRO/DX INJ NEW DRUG ADDON: CPT

## 2017-07-24 PROCEDURE — 99285 EMERGENCY DEPT VISIT HI MDM: CPT

## 2017-07-24 PROCEDURE — 85610 PROTHROMBIN TIME: CPT | Performed by: INTERNAL MEDICINE

## 2017-07-24 PROCEDURE — 85025 COMPLETE CBC W/AUTO DIFF WBC: CPT | Performed by: INTERNAL MEDICINE

## 2017-07-24 RX ORDER — MORPHINE SULFATE 2 MG/ML
2 INJECTION, SOLUTION INTRAMUSCULAR; INTRAVENOUS
Status: COMPLETED | OUTPATIENT
Start: 2017-07-24 | End: 2017-07-24

## 2017-07-24 RX ORDER — FAMOTIDINE 40 MG/1
40 TABLET, FILM COATED ORAL
Qty: 30 TAB | Refills: 3 | Status: SHIPPED | OUTPATIENT
Start: 2017-07-24 | End: 2021-01-23

## 2017-07-24 RX ORDER — DIPHENHYDRAMINE HYDROCHLORIDE 50 MG/ML
25 INJECTION, SOLUTION INTRAMUSCULAR; INTRAVENOUS
Status: COMPLETED | OUTPATIENT
Start: 2017-07-24 | End: 2017-07-24

## 2017-07-24 RX ORDER — ASPIRIN 325 MG
325 TABLET ORAL ONCE
Status: COMPLETED | OUTPATIENT
Start: 2017-07-24 | End: 2017-07-24

## 2017-07-24 RX ORDER — FAMOTIDINE 10 MG/ML
20 INJECTION INTRAVENOUS
Status: COMPLETED | OUTPATIENT
Start: 2017-07-24 | End: 2017-07-24

## 2017-07-24 RX ORDER — ONDANSETRON 2 MG/ML
4 INJECTION INTRAMUSCULAR; INTRAVENOUS
Status: COMPLETED | OUTPATIENT
Start: 2017-07-24 | End: 2017-07-24

## 2017-07-24 RX ORDER — ACETAMINOPHEN 325 MG/1
650 TABLET ORAL
Qty: 30 TAB | Refills: 0 | Status: ON HOLD | OUTPATIENT
Start: 2017-07-24 | End: 2019-06-13

## 2017-07-24 RX ORDER — RANITIDINE 150 MG/1
150 TABLET, FILM COATED ORAL 2 TIMES DAILY
Qty: 60 TAB | Refills: 0 | Status: SHIPPED | OUTPATIENT
Start: 2017-07-24 | End: 2017-07-24

## 2017-07-24 RX ORDER — SODIUM CHLORIDE 0.9 % (FLUSH) 0.9 %
5-10 SYRINGE (ML) INJECTION
Status: COMPLETED | OUTPATIENT
Start: 2017-07-24 | End: 2017-07-24

## 2017-07-24 RX ORDER — FAMOTIDINE 20 MG/1
20 TABLET, FILM COATED ORAL
Status: COMPLETED | OUTPATIENT
Start: 2017-07-24 | End: 2017-07-24

## 2017-07-24 RX ORDER — ONDANSETRON 4 MG/1
4 TABLET, ORALLY DISINTEGRATING ORAL
Status: COMPLETED | OUTPATIENT
Start: 2017-07-24 | End: 2017-07-24

## 2017-07-24 RX ORDER — RANITIDINE 150 MG/1
150 TABLET, FILM COATED ORAL 2 TIMES DAILY
Qty: 60 TAB | Refills: 0 | Status: SHIPPED | OUTPATIENT
Start: 2017-07-24 | End: 2017-08-03

## 2017-07-24 RX ADMIN — ASPIRIN 325 MG ORAL TABLET 325 MG: 325 PILL ORAL at 17:53

## 2017-07-24 RX ADMIN — ONDANSETRON 4 MG: 4 TABLET, ORALLY DISINTEGRATING ORAL at 17:53

## 2017-07-24 RX ADMIN — FAMOTIDINE 20 MG: 20 TABLET ORAL at 17:55

## 2017-07-24 RX ADMIN — DIPHENHYDRAMINE HYDROCHLORIDE 25 MG: 50 INJECTION INTRAMUSCULAR; INTRAVENOUS at 19:44

## 2017-07-24 RX ADMIN — METHYLPREDNISOLONE SODIUM SUCCINATE 125 MG: 125 INJECTION, POWDER, FOR SOLUTION INTRAMUSCULAR; INTRAVENOUS at 19:46

## 2017-07-24 RX ADMIN — IOPAMIDOL 100 ML: 755 INJECTION, SOLUTION INTRAVENOUS at 20:06

## 2017-07-24 RX ADMIN — PHENOBARBITAL ELIXIR 50 ML: 16.2; .1037; .0065; .0194 ELIXIR ORAL at 17:53

## 2017-07-24 RX ADMIN — Medication 2 MG: at 17:53

## 2017-07-24 RX ADMIN — ONDANSETRON 4 MG: 2 INJECTION, SOLUTION INTRAMUSCULAR; INTRAVENOUS at 19:45

## 2017-07-24 RX ADMIN — FAMOTIDINE 20 MG: 10 INJECTION INTRAVENOUS at 19:41

## 2017-07-24 RX ADMIN — Medication 10 ML: at 20:07

## 2017-07-24 NOTE — ED PROVIDER NOTES
HPI Comments: Patience Fofana is a 27 y.o. female with PMHx significant for HTN, Ovarian cancer who presents ambulatory to [facility] ED with cc of acute onset \"sharp\" constant 9/10 CP that woke her up from her sleep at 2 PM today with associated SOB and numbness down her left arm. Pt reports her pain is an 8/10 in the ED but notes it is worsening again. Pt did recently deliver her son on the 4th of July, she notes she has had heartburn while pregnant and took pepcid with relief. She tried taking Pepcid today with no relief, she also tried Tylenol and TUMS with no significant relief. Pt reports her pain is worse with inspiration and denies any alleviating factors. Pt specifically denies any abdominal pain, nausea, vomiting, diarrhea, vaginal pain, vaginal discharge, vaginal bleeding. PCP: None    Social Hx: + tobacco (everyday smoker), +EtOH (occasionally), - illicit drugs (-). There are no other complaints, changes or physical findings at this time. The history is provided by the patient. No  was used. Past Medical History:   Diagnosis Date    Cancer ([facility] [address]     ovarian     Hypertension     with pregnancy    Ill-defined condition     MS    Ill-defined condition     TIMOTHY 1 with last pap       History reviewed. No pertinent surgical history. Family History:   Problem Relation Age of Onset    Kidney Disease Mother     Cancer Father        Social History     Social History    Marital status: SINGLE     Spouse name: N/A    Number of children: N/A    Years of education: N/A     Occupational History    Not on file.      Social History Main Topics    Smoking status: Current Some Day Smoker    Smokeless tobacco: Never Used      Comment: not since being pregnant; quit in Nov 2016    Alcohol use No      Comment: occ    Drug use: No    Sexual activity: Yes     Partners: Male     Birth control/ protection: None     Other Topics Concern    Not on file     Social History Narrative ALLERGIES: Shellfish derived    Review of Systems   Constitutional: Negative. Negative for activity change, appetite change, chills, fatigue, fever and unexpected weight change. HENT: Negative. Negative for congestion, hearing loss, rhinorrhea, sneezing and voice change. Eyes: Negative. Negative for pain and visual disturbance. Respiratory: Positive for shortness of breath. Negative for apnea, cough, choking and chest tightness. Cardiovascular: Positive for chest pain. Negative for palpitations. Gastrointestinal: Negative. Negative for abdominal distention, abdominal pain, blood in stool, diarrhea, nausea and vomiting. Genitourinary: Negative. Negative for difficulty urinating, flank pain, frequency, urgency, vaginal bleeding, vaginal discharge and vaginal pain. No discharge   Musculoskeletal: Negative. Negative for arthralgias, back pain, myalgias and neck stiffness. Skin: Negative. Negative for color change and rash. Neurological: Positive for numbness. Negative for dizziness, seizures, syncope, speech difficulty, weakness and headaches. Hematological: Negative for adenopathy. Psychiatric/Behavioral: Negative. Negative for agitation, behavioral problems, dysphoric mood and suicidal ideas. The patient is not nervous/anxious. Patient Vitals for the past 12 hrs:   Temp Pulse Resp BP SpO2   07/24/17 1622 - - - (!) 156/109 -   07/24/17 1621 98.2 °F (36.8 °C) 79 20 (!) 162/118 97 %            Physical Exam   Constitutional: She is oriented to person, place, and time. She appears well-developed and well-nourished. HENT:   Head: Normocephalic and atraumatic. Mouth/Throat: Oropharynx is clear and moist.   Eyes: Conjunctivae and EOM are normal. Pupils are equal, round, and reactive to light. Neck: Normal range of motion. Neck supple. Cardiovascular: Normal rate, regular rhythm and normal heart sounds. Exam reveals no gallop and no friction rub.     No murmur heard.  Pulmonary/Chest: Effort normal and breath sounds normal. No respiratory distress. She has no wheezes. She has no rales. Abdominal: Soft. Bowel sounds are normal. She exhibits no distension. There is no tenderness. There is no rebound and no guarding. Musculoskeletal: Normal range of motion. She exhibits no edema or tenderness. Lymphadenopathy:     She has no cervical adenopathy. Neurological: She is alert and oriented to person, place, and time. She has normal strength. No cranial nerve deficit or sensory deficit. She displays a negative Romberg sign. Coordination and gait normal.   Skin: Skin is warm and dry. No ecchymosis, no lesion and no rash noted. Rash is not urticarial. She is not diaphoretic. No erythema. Psychiatric: She has a normal mood and affect. Nursing note and vitals reviewed. MDM  Number of Diagnoses or Management Options  Diagnosis management comments: DDx: musculoskeletal pain, atypical CP, PE, CAD, Angina. Amount and/or Complexity of Data Reviewed  Clinical lab tests: ordered and reviewed  Tests in the medicine section of CPT®: ordered and reviewed  Review and summarize past medical records: yes    Patient Progress  Patient progress: stable    ED Course       Procedures    EKG interpretation: (Preliminary)  4:32 PM  Rhythm: normal sinus rhythm; and regular . Rate (approx.): 68; Axis: normal; DC interval: normal; QRS interval: normal ; ST/T wave: normal; Other findings: normal.  Written by MYLA Lairdibe as dictated by Kennedy Prince MD    EKG interpretation: (Preliminary)  5:03 PM  Rhythm: normal sinus rhythm and sinus arrhythmia; and regular . Rate (approx.): 69;  Axis: normal; DC interval: normal; QRS interval: normal ; ST/T wave: normal; Other findings: normal.  Written by Benjamin Colon ED Scribe as dictated by Kennedy Prince MD

## 2017-07-24 NOTE — ED NOTES
Patient has been instructed that they have been given Benadryl* which contains opioids, benzodiazepines, or other sedating drugs. Patient is aware that they  will need to refrain from driving or operating heavy machinery after taking this medication. Patient also instructed that they need to avoid drinking alcohol and using other products containing opioids, benzodiazepines, or other sedating drugs. Patient verbalized understanding.

## 2017-07-24 NOTE — ED NOTES
Pt c/o CP. S/P postpartum 3 weeks. Pt denies N/V/D. Emergency Department Nursing Plan of Care       The Nursing Plan of Care is developed from the Nursing assessment and Emergency Department Attending provider initial evaluation. The plan of care may be reviewed in the ED Provider note.     The Plan of Care was developed with the following considerations:   Patient / Family readiness to learn indicated by:verbalized understanding  Persons(s) to be included in education: patient  Barriers to Learning/Limitations:No    Signed     Rut Rosario RN    7/24/2017   4:43 PM

## 2017-07-24 NOTE — ED TRIAGE NOTES
C/o constant chest pain/tightness radiating to RUE x 2 hrs PTA, denies SOB/palpitations, not alleviated with OTC antacids and milk

## 2017-07-25 LAB
ATRIAL RATE: 68 BPM
ATRIAL RATE: 69 BPM
CALCULATED P AXIS, ECG09: 27 DEGREES
CALCULATED P AXIS, ECG09: 29 DEGREES
CALCULATED R AXIS, ECG10: 25 DEGREES
CALCULATED R AXIS, ECG10: 33 DEGREES
CALCULATED T AXIS, ECG11: 2 DEGREES
CALCULATED T AXIS, ECG11: 8 DEGREES
DIAGNOSIS, 93000: NORMAL
DIAGNOSIS, 93000: NORMAL
P-R INTERVAL, ECG05: 146 MS
P-R INTERVAL, ECG05: 160 MS
Q-T INTERVAL, ECG07: 416 MS
Q-T INTERVAL, ECG07: 428 MS
QRS DURATION, ECG06: 76 MS
QRS DURATION, ECG06: 78 MS
QTC CALCULATION (BEZET), ECG08: 442 MS
QTC CALCULATION (BEZET), ECG08: 458 MS
VENTRICULAR RATE, ECG03: 68 BPM
VENTRICULAR RATE, ECG03: 69 BPM

## 2017-07-25 NOTE — DISCHARGE INSTRUCTIONS
Chest Pain: Care Instructions  Your Care Instructions  There are many things that can cause chest pain. Some are not serious and will get better on their own in a few days. But some kinds of chest pain need more testing and treatment. Your doctor may have recommended a follow-up visit in the next 8 to 12 hours. If you are not getting better, you may need more tests or treatment. Even though your doctor has released you, you still need to watch for any problems. The doctor carefully checked you, but sometimes problems can develop later. If you have new symptoms or if your symptoms do not get better, get medical care right away. If you have worse or different chest pain or pressure that lasts more than 5 minutes or you passed out (lost consciousness), call 911 or seek other emergency help right away. A medical visit is only one step in your treatment. Even if you feel better, you still need to do what your doctor recommends, such as going to all suggested follow-up appointments and taking medicines exactly as directed. This will help you recover and help prevent future problems. How can you care for yourself at home? · Rest until you feel better. · Take your medicine exactly as prescribed. Call your doctor if you think you are having a problem with your medicine. · Do not drive after taking a prescription pain medicine. When should you call for help? Call 911 if:  · You passed out (lost consciousness). · You have severe difficulty breathing. · You have symptoms of a heart attack. These may include:  ¨ Chest pain or pressure, or a strange feeling in your chest.  ¨ Sweating. ¨ Shortness of breath. ¨ Nausea or vomiting. ¨ Pain, pressure, or a strange feeling in your back, neck, jaw, or upper belly or in one or both shoulders or arms. ¨ Lightheadedness or sudden weakness. ¨ A fast or irregular heartbeat.   After you call 911, the  may tell you to chew 1 adult-strength or 2 to 4 low-dose aspirin. Wait for an ambulance. Do not try to drive yourself. Call your doctor today if:  · You have any trouble breathing. · Your chest pain gets worse. · You are dizzy or lightheaded, or you feel like you may faint. · You are not getting better as expected. · You are having new or different chest pain. Where can you learn more? Go to http://danielle-irais.info/. Enter A120 in the search box to learn more about \"Chest Pain: Care Instructions. \"  Current as of: March 20, 2017  Content Version: 11.3  © 3502-5315 [employer]. Care instructions adapted under license by [employer] (which disclaims liability or warranty for this information). If you have questions about a medical condition or this instruction, always ask your healthcare professional. [address] any warranty or liability for your use of this information. Musculoskeletal Chest Pain: Care Instructions  Your Care Instructions  Chest pain is not always a sign that something is wrong with your heart or that you have another serious problem. The doctor thinks your chest pain is caused by strained muscles or ligaments, inflamed chest cartilage, or another problem in your chest, rather than by your heart. You may need more tests to find the cause of your chest pain. Follow-up care is a key part of your treatment and safety. Be sure to make and go to all appointments, and call your doctor if you are having problems. Its also a good idea to know your test results and keep a list of the medicines you take. How can you care for yourself at home? · Take pain medicines exactly as directed. ¨ If the doctor gave you a prescription medicine for pain, take it as prescribed. ¨ If you are not taking a prescription pain medicine, ask your doctor if you can take an over-the-counter medicine. · Rest and protect the sore area.   · Stop, change, or take a break from any activity that may be causing your pain or soreness. · Put ice or a cold pack on the sore area for 10 to 20 minutes at a time. Try to do this every 1 to 2 hours for the next 3 days (when you are awake) or until the swelling goes down. Put a thin cloth between the ice and your skin. · After 2 or 3 days, apply a heating pad set on low or a warm cloth to the area that hurts. Some doctors suggest that you go back and forth between hot and cold. · Do not wrap or tape your ribs for support. This may cause you to take smaller breaths, which could increase your risk of lung problems. · Mentholated creams such as Bengay or Icy Hot may soothe sore muscles. Follow the instructions on the package. · Follow your doctor's instructions for exercising. · Gentle stretching and massage may help you get better faster. Stretch slowly to the point just before pain begins, and hold the stretch for at least 15 to 30 seconds. Do this 3 or 4 times a day. Stretch just after you have applied heat. · As your pain gets better, slowly return to your normal activities. Any increased pain may be a sign that you need to rest a while longer. When should you call for help? Call 911 anytime you think you may need emergency care. For example, call if:  · You have chest pain or pressure. This may occur with:  ¨ Sweating. ¨ Shortness of breath. ¨ Nausea or vomiting. ¨ Pain that spreads from the chest to the neck, jaw, or one or both shoulders or arms. ¨ Dizziness or lightheadedness. ¨ A fast or uneven pulse. After calling 911, chew 1 adult-strength aspirin. Wait for an ambulance. Do not try to drive yourself. · You have sudden chest pain and shortness of breath, or you cough up blood. Call your doctor now or seek immediate medical care if:  · You have any trouble breathing. · Your chest pain gets worse.   · Your chest pain occurs consistently with exercise and is relieved by rest.  Watch closely for changes in your health, and be sure to contact your doctor if:  · Your chest pain does not get better after 1 week. Where can you learn more? Go to http://danielle-irais.info/. Enter V293 in the search box to learn more about \"Musculoskeletal Chest Pain: Care Instructions. \"  Current as of: March 20, 2017  Content Version: 11.3  © 5508-9386 [employer]. Care instructions adapted under license by [employer] (which disclaims liability or warranty for this information). If you have questions about a medical condition or this instruction, always ask your healthcare professional. [address] any warranty or liability for your use of this information. Gastroesophageal Reflux Disease (GERD): Care Instructions  Your Care Instructions    Gastroesophageal reflux disease (GERD) is the backward flow of stomach acid into the esophagus. The esophagus is the tube that leads from your throat to your stomach. A one-way valve prevents the stomach acid from moving up into this tube. When you have GERD, this valve does not close tightly enough. If you have mild GERD symptoms including heartburn, you may be able to control the problem with antacids or over-the-counter medicine. Changing your diet, losing weight, and making other lifestyle changes can also help reduce symptoms. Follow-up care is a key part of your treatment and safety. Be sure to make and go to all appointments, and call your doctor if you are having problems. Its also a good idea to know your test results and keep a list of the medicines you take. How can you care for yourself at home? · Take your medicines exactly as prescribed. Call your doctor if you think you are having a problem with your medicine. · Your doctor may recommend over-the-counter medicine. For mild or occasional indigestion, antacids, such as Tums, Gaviscon, Mylanta, or Maalox, may help.  Your doctor also may recommend over-the-counter acid reducers, such as Pepcid AC, Tagamet HB, Zantac 75, or Prilosec. Read and follow all instructions on the label. If you use these medicines often, talk with your doctor. · Change your eating habits. ¨ Its best to eat several small meals instead of two or three large meals. ¨ After you eat, wait 2 to 3 hours before you lie down. ¨ Chocolate, mint, and alcohol can make GERD worse. ¨ Spicy foods, foods that have a lot of acid (like tomatoes and oranges), and coffee can make GERD symptoms worse in some people. If your symptoms are worse after you eat a certain food, you may want to stop eating that food to see if your symptoms get better. · Do not smoke or chew tobacco. Smoking can make GERD worse. If you need help quitting, talk to your doctor about stop-smoking programs and medicines. These can increase your chances of quitting for good. · If you have GERD symptoms at night, raise the head of your bed 6 to 8 inches by putting the frame on blocks or placing a foam wedge under the head of your mattress. (Adding extra pillows does not work.)  · Do not wear tight clothing around your middle. · Lose weight if you need to. Losing just 5 to 10 pounds can help. When should you call for help? Call your doctor now or seek immediate medical care if:  · You have new or different belly pain. · Your stools are black and tarlike or have streaks of blood. Watch closely for changes in your health, and be sure to contact your doctor if:  · Your symptoms have not improved after 2 days. · Food seems to catch in your throat or chest.  Where can you learn more? Go to http://danielle-irais.info/. Enter H521 in the search box to learn more about \"Gastroesophageal Reflux Disease (GERD): Care Instructions. \"  Current as of: August 9, 2016  Content Version: 11.3  © 8039-5868 [employer]. Care instructions adapted under license by [employer] (which disclaims liability or warranty for this information).  If you have questions about a medical condition or this instruction, always ask your healthcare professional. [place] [address] any warranty or liability for your use of this information.

## 2017-07-25 NOTE — ED NOTES
Pt's ride at bedside. Pt ambulated out of the ED with ride. Discharge instructions were given to the patient by Inocencia Munoz RN. The patient left the Emergency Department ambulatory, alert and oriented and in no acute distress with 2 prescriptions. The patient was encouraged to call or return to the ED for worsening issues or problems and was encouraged to schedule a follow up appointment for continuing care. The patient verbalized understanding of discharge instructions and prescriptions, all questions were answered. The patient has no further concerns at this time.

## 2017-07-25 NOTE — ED NOTES
Pt drove herself here. Pt appears lethargic and reports being \"very sleepy\", pt agreed to call a ride to take her home. Pt to be discharged once ride is at bedside.

## 2017-08-16 ENCOUNTER — HOSPITAL ENCOUNTER (OUTPATIENT)
Dept: SLEEP MEDICINE | Age: 27
Discharge: HOME | End: 2017-08-16
Payer: COMMERCIAL

## 2017-08-16 VITALS
DIASTOLIC BLOOD PRESSURE: 86 MMHG | BODY MASS INDEX: 35.31 KG/M2 | SYSTOLIC BLOOD PRESSURE: 127 MMHG | TEMPERATURE: 98.3 F | WEIGHT: 225 LBS | HEART RATE: 102 BPM | HEIGHT: 67 IN | OXYGEN SATURATION: 96 %

## 2017-08-16 DIAGNOSIS — G47.33 OSA (OBSTRUCTIVE SLEEP APNEA): ICD-10-CM

## 2017-08-16 PROCEDURE — 95810 POLYSOM 6/> YRS 4/> PARAM: CPT | Performed by: INTERNAL MEDICINE

## 2017-08-25 ENCOUNTER — TELEPHONE (OUTPATIENT)
Dept: SLEEP MEDICINE | Age: 27
End: 2017-08-25

## 2017-08-25 DIAGNOSIS — G47.33 OSA (OBSTRUCTIVE SLEEP APNEA): Primary | ICD-10-CM

## 2017-08-25 NOTE — TELEPHONE ENCOUNTER
Results of Sleep Testing, PAP titration and follow-up discussed with patient. Patient encouraged to call if there were any further questions regarding sleep symptoms. Encounter Diagnosis   Name Primary?     YAEL (obstructive sleep apnea) Yes       Orders Placed This Encounter    SLEEP LAB (PAP TITRATION)     Standing Status:   Future     Standing Expiration Date:   2/23/2018     Order Specific Question:   Reason for Exam     Answer:   YAEL

## 2018-05-21 ENCOUNTER — APPOINTMENT (OUTPATIENT)
Dept: CT IMAGING | Age: 28
End: 2018-05-21
Attending: PHYSICIAN ASSISTANT
Payer: COMMERCIAL

## 2018-05-21 ENCOUNTER — HOSPITAL ENCOUNTER (EMERGENCY)
Age: 28
Discharge: HOME | End: 2018-05-21
Attending: EMERGENCY MEDICINE
Payer: COMMERCIAL

## 2018-05-21 VITALS
HEIGHT: 67 IN | DIASTOLIC BLOOD PRESSURE: 99 MMHG | SYSTOLIC BLOOD PRESSURE: 161 MMHG | WEIGHT: 224 LBS | RESPIRATION RATE: 20 BRPM | BODY MASS INDEX: 35.16 KG/M2 | HEART RATE: 84 BPM | TEMPERATURE: 97.9 F | OXYGEN SATURATION: 100 %

## 2018-05-21 DIAGNOSIS — R07.9 CHEST PAIN, UNSPECIFIED TYPE: Primary | ICD-10-CM

## 2018-05-21 DIAGNOSIS — R03.0 ELEVATED BLOOD PRESSURE READING WITHOUT DIAGNOSIS OF HYPERTENSION: ICD-10-CM

## 2018-05-21 LAB
ALBUMIN SERPL-MCNC: 3.1 G/DL (ref 3.5–5)
ALBUMIN/GLOB SERPL: 0.6 {RATIO} (ref 1.1–2.2)
ALP SERPL-CCNC: 145 U/L (ref 45–117)
ALT SERPL-CCNC: 62 U/L (ref 12–78)
ANION GAP SERPL CALC-SCNC: 9 MMOL/L (ref 5–15)
AST SERPL-CCNC: 232 U/L (ref 15–37)
ATRIAL RATE: 82 BPM
BASOPHILS # BLD: 0 K/UL (ref 0–0.1)
BASOPHILS NFR BLD: 0 % (ref 0–1)
BILIRUB SERPL-MCNC: 1 MG/DL (ref 0.2–1)
BUN SERPL-MCNC: 9 MG/DL (ref 6–20)
BUN/CREAT SERPL: 15 (ref 12–20)
CALCIUM SERPL-MCNC: 9.5 MG/DL (ref 8.5–10.1)
CALCULATED P AXIS, ECG09: 59 DEGREES
CALCULATED R AXIS, ECG10: 21 DEGREES
CALCULATED T AXIS, ECG11: 14 DEGREES
CHLORIDE SERPL-SCNC: 105 MMOL/L (ref 97–108)
CK SERPL-CCNC: 90 U/L (ref 26–192)
CO2 SERPL-SCNC: 25 MMOL/L (ref 21–32)
CREAT SERPL-MCNC: 0.6 MG/DL (ref 0.55–1.02)
D DIMER PPP FEU-MCNC: 1.61 MG/L FEU (ref 0–0.65)
DIAGNOSIS, 93000: NORMAL
DIFFERENTIAL METHOD BLD: ABNORMAL
EOSINOPHIL # BLD: 0 K/UL (ref 0–0.4)
EOSINOPHIL NFR BLD: 0 % (ref 0–7)
ERYTHROCYTE [DISTWIDTH] IN BLOOD BY AUTOMATED COUNT: 16.1 % (ref 11.5–14.5)
GLOBULIN SER CALC-MCNC: 4.8 G/DL (ref 2–4)
GLUCOSE SERPL-MCNC: 100 MG/DL (ref 65–100)
HCG UR QL: NEGATIVE
HCT VFR BLD AUTO: 35.8 % (ref 35–47)
HGB BLD-MCNC: 11.8 G/DL (ref 11.5–16)
IMM GRANULOCYTES # BLD: 0.1 K/UL (ref 0–0.04)
IMM GRANULOCYTES NFR BLD AUTO: 0 % (ref 0–0.5)
LYMPHOCYTES # BLD: 2.5 K/UL (ref 0.8–3.5)
LYMPHOCYTES NFR BLD: 20 % (ref 12–49)
MCH RBC QN AUTO: 28 PG (ref 26–34)
MCHC RBC AUTO-ENTMCNC: 33 G/DL (ref 30–36.5)
MCV RBC AUTO: 85 FL (ref 80–99)
MONOCYTES # BLD: 0.7 K/UL (ref 0–1)
MONOCYTES NFR BLD: 6 % (ref 5–13)
NEUTS SEG # BLD: 8.9 K/UL (ref 1.8–8)
NEUTS SEG NFR BLD: 73 % (ref 32–75)
NRBC # BLD: 0 K/UL (ref 0–0.01)
NRBC BLD-RTO: 0 PER 100 WBC
P-R INTERVAL, ECG05: 154 MS
PLATELET # BLD AUTO: 242 K/UL (ref 150–400)
PMV BLD AUTO: 10.7 FL (ref 8.9–12.9)
POTASSIUM SERPL-SCNC: 3.9 MMOL/L (ref 3.5–5.1)
PROT SERPL-MCNC: 7.9 G/DL (ref 6.4–8.2)
Q-T INTERVAL, ECG07: 376 MS
QRS DURATION, ECG06: 86 MS
QTC CALCULATION (BEZET), ECG08: 439 MS
RBC # BLD AUTO: 4.21 M/UL (ref 3.8–5.2)
SODIUM SERPL-SCNC: 139 MMOL/L (ref 136–145)
TROPONIN I SERPL-MCNC: <0.04 NG/ML
VENTRICULAR RATE, ECG03: 82 BPM
WBC # BLD AUTO: 12.1 K/UL (ref 3.6–11)

## 2018-05-21 PROCEDURE — 71275 CT ANGIOGRAPHY CHEST: CPT

## 2018-05-21 PROCEDURE — 81025 URINE PREGNANCY TEST: CPT

## 2018-05-21 PROCEDURE — 99284 EMERGENCY DEPT VISIT MOD MDM: CPT

## 2018-05-21 PROCEDURE — 82550 ASSAY OF CK (CPK): CPT | Performed by: PHYSICIAN ASSISTANT

## 2018-05-21 PROCEDURE — 85379 FIBRIN DEGRADATION QUANT: CPT | Performed by: PHYSICIAN ASSISTANT

## 2018-05-21 PROCEDURE — 84484 ASSAY OF TROPONIN QUANT: CPT | Performed by: PHYSICIAN ASSISTANT

## 2018-05-21 PROCEDURE — 96374 THER/PROPH/DIAG INJ IV PUSH: CPT

## 2018-05-21 PROCEDURE — 74011250636 HC RX REV CODE- 250/636: Performed by: PHYSICIAN ASSISTANT

## 2018-05-21 PROCEDURE — 85025 COMPLETE CBC W/AUTO DIFF WBC: CPT | Performed by: PHYSICIAN ASSISTANT

## 2018-05-21 PROCEDURE — 36415 COLL VENOUS BLD VENIPUNCTURE: CPT | Performed by: PHYSICIAN ASSISTANT

## 2018-05-21 PROCEDURE — 80053 COMPREHEN METABOLIC PANEL: CPT | Performed by: PHYSICIAN ASSISTANT

## 2018-05-21 PROCEDURE — 74011636320 HC RX REV CODE- 636/320: Performed by: EMERGENCY MEDICINE

## 2018-05-21 PROCEDURE — 93005 ELECTROCARDIOGRAM TRACING: CPT

## 2018-05-21 PROCEDURE — 96375 TX/PRO/DX INJ NEW DRUG ADDON: CPT

## 2018-05-21 RX ORDER — DEXAMETHASONE SODIUM PHOSPHATE 100 MG/10ML
10 INJECTION INTRAMUSCULAR; INTRAVENOUS
Status: COMPLETED | OUTPATIENT
Start: 2018-05-21 | End: 2018-05-21

## 2018-05-21 RX ORDER — MORPHINE SULFATE 4 MG/ML
2 INJECTION, SOLUTION INTRAMUSCULAR; INTRAVENOUS
Status: COMPLETED | OUTPATIENT
Start: 2018-05-21 | End: 2018-05-21

## 2018-05-21 RX ORDER — DIPHENHYDRAMINE HYDROCHLORIDE 50 MG/ML
25 INJECTION, SOLUTION INTRAMUSCULAR; INTRAVENOUS
Status: COMPLETED | OUTPATIENT
Start: 2018-05-21 | End: 2018-05-21

## 2018-05-21 RX ORDER — SODIUM CHLORIDE 0.9 % (FLUSH) 0.9 %
5-10 SYRINGE (ML) INJECTION
Status: DISCONTINUED | OUTPATIENT
Start: 2018-05-21 | End: 2018-05-21 | Stop reason: HOSPADM

## 2018-05-21 RX ADMIN — DEXAMETHASONE SODIUM PHOSPHATE 10 MG: 10 INJECTION INTRAMUSCULAR; INTRAVENOUS at 12:30

## 2018-05-21 RX ADMIN — IOPAMIDOL 100 ML: 755 INJECTION, SOLUTION INTRAVENOUS at 11:58

## 2018-05-21 RX ADMIN — MORPHINE SULFATE 2 MG: 4 INJECTION, SOLUTION INTRAMUSCULAR; INTRAVENOUS at 12:29

## 2018-05-21 RX ADMIN — DIPHENHYDRAMINE HYDROCHLORIDE 25 MG: 50 INJECTION INTRAMUSCULAR; INTRAVENOUS at 12:30

## 2018-05-21 NOTE — ED NOTES
PIV removed intact and provider at bedside reviewing results and plan of care. Pt accepted plan and F/U. Pt left unit steady gait. Patient (s)  given copy of dc instructions and  script(s). Patient (s)  verbalized understanding of instructions and script (s). Patient given a current medication reconciliation form and verbalized understanding of their medications. Patient (s) verbalized understanding of the importance of discussing medications with  his or her physician or clinic they will be following up with. Patient alert and oriented and in no acute distress. Patient discharged home ambulatory with family.

## 2018-05-21 NOTE — DISCHARGE INSTRUCTIONS
Elevated Blood Pressure: Care Instructions  Your Care Instructions    Blood pressure is a measure of how hard the blood pushes against the walls of your arteries. It's normal for blood pressure to go up and down throughout the day. But if it stays up over time, you have high blood pressure. Two numbers tell you your blood pressure. The first number is the systolic pressure. It shows how hard the blood pushes when your heart is pumping. The second number is the diastolic pressure. It shows how hard the blood pushes between heartbeats, when your heart is relaxed and filling with blood. An ideal blood pressure in adults is less than 120/80 (say \"120 over 80\"). High blood pressure is 140/90 or higher. You have high blood pressure if your top number is 140 or higher or your bottom number is 90 or higher, or both. The main test for high blood pressure is simple, fast, and painless. To diagnose high blood pressure, your doctor will test your blood pressure at different times. After testing your blood pressure, your doctor may ask you to test it again when you are home. If you are diagnosed with high blood pressure, you can work with your doctor to make a long-term plan to manage it. Follow-up care is a key part of your treatment and safety. Be sure to make and go to all appointments, and call your doctor if you are having problems. It's also a good idea to know your test results and keep a list of the medicines you take. How can you care for yourself at home? · Do not smoke. Smoking increases your risk for heart attack and stroke. If you need help quitting, talk to your doctor about stop-smoking programs and medicines. These can increase your chances of quitting for good. · Stay at a healthy weight. · Try to limit how much sodium you eat to less than 2,300 milligrams (mg) a day. Your doctor may ask you to try to eat less than 1,500 mg a day. · Be physically active.  Get at least 30 minutes of exercise on most days of the week. Walking is a good choice. You also may want to do other activities, such as running, swimming, cycling, or playing tennis or team sports. · Avoid or limit alcohol. Talk to your doctor about whether you can drink any alcohol. · Eat plenty of fruits, vegetables, and low-fat dairy products. Eat less saturated and total fats. · Learn how to check your blood pressure at home. When should you call for help? Call your doctor now or seek immediate medical care if:  ? · Your blood pressure is much higher than normal (such as 180/110 or higher). ? · You think high blood pressure is causing symptoms such as:  ¨ Severe headache. ¨ Blurry vision. ? Watch closely for changes in your health, and be sure to contact your doctor if:  ? · You do not get better as expected. Where can you learn more? Go to http://danielleGrupo IMOirais.info/. Enter X379 in the search box to learn more about \"Elevated Blood Pressure: Care Instructions. \"  Current as of: September 21, 2016  Content Version: 11.4  © 7119-2178 [employer]. Care instructions adapted under license by [employer] (which disclaims liability or warranty for this information). If you have questions about a medical condition or this instruction, always ask your healthcare professional. [address] any warranty or liability for your use of this information. Chest Pain: Care Instructions  Your Care Instructions    There are many things that can cause chest pain. Some are not serious and will get better on their own in a few days. But some kinds of chest pain need more testing and treatment. Your doctor may have recommended a follow-up visit in the next 8 to 12 hours. If you are not getting better, you may need more tests or treatment. Even though your doctor has released you, you still need to watch for any problems.  The doctor carefully checked you, but sometimes problems can develop later. If you have new symptoms or if your symptoms do not get better, get medical care right away. If you have worse or different chest pain or pressure that lasts more than 5 minutes or you passed out (lost consciousness), call 911 or seek other emergency help right away. A medical visit is only one step in your treatment. Even if you feel better, you still need to do what your doctor recommends, such as going to all suggested follow-up appointments and taking medicines exactly as directed. This will help you recover and help prevent future problems. How can you care for yourself at home? · Rest until you feel better. · Take your medicine exactly as prescribed. Call your doctor if you think you are having a problem with your medicine. · Do not drive after taking a prescription pain medicine. When should you call for help? Call 911 if:  ? · You passed out (lost consciousness). ? · You have severe difficulty breathing. ? · You have symptoms of a heart attack. These may include:  ¨ Chest pain or pressure, or a strange feeling in your chest.  ¨ Sweating. ¨ Shortness of breath. ¨ Nausea or vomiting. ¨ Pain, pressure, or a strange feeling in your back, neck, jaw, or upper belly or in one or both shoulders or arms. ¨ Lightheadedness or sudden weakness. ¨ A fast or irregular heartbeat. After you call 911, the  may tell you to chew 1 adult-strength or 2 to 4 low-dose aspirin. Wait for an ambulance. Do not try to drive yourself. ?Call your doctor today if:  ? · You have any trouble breathing. ? · Your chest pain gets worse. ? · You are dizzy or lightheaded, or you feel like you may faint. ? · You are not getting better as expected. ? · You are having new or different chest pain. Where can you learn more? Go to http://danielle-irais.info/. Enter A120 in the search box to learn more about \"Chest Pain: Care Instructions. \"  Current as of: March 20, 2017  Content Version: 11.4  © 6542-1799 [employer]. Care instructions adapted under license by [employer] (which disclaims liability or warranty for this information). If you have questions about a medical condition or this instruction, always ask your healthcare professional. [address] any warranty or liability for your use of this information.

## 2018-05-21 NOTE — ED NOTES
Pt sts had CP that woke her up, this am. Pt sts she took med's for acid reflux and still no relief. Emergency Department Nursing Plan of Care       The Nursing Plan of Care is developed from the Nursing assessment and Emergency Department Attending provider initial evaluation. The plan of care may be reviewed in the ED Provider note.     The Plan of Care was developed with the following considerations:   Patient / Family readiness to learn indicated by:verbalized understanding  Persons(s) to be included in education: patient  Barriers to Learning/Limitations:No    Signed     Amy Morton RN    5/21/2018   10:11 AM

## 2018-05-21 NOTE — LETTER
[facility] EMERGENCY DEPT 
[address] [id number] 
[phone] Work/School Note Date: 5/21/2018 To Whom It May concern: 
 
Bear Huitron was seen and treated today in the emergency room by the following provider(s): 
Attending Provider: Cal Anders MD 
Physician Assistant: Erma Olszewski, PA-C. [facility] {Return to work:05/23/2018 Sincerely, 
 
 
 
 
Chucho Waller RN

## 2018-05-21 NOTE — ED PROVIDER NOTES
EMERGENCY DEPARTMENT HISTORY AND PHYSICAL EXAM    Date: 5/21/2018  Patient Name: Desire Song    History of Presenting Illness     Chief Complaint   Patient presents with    Chest Pain     that began around 345am today. Reports taking motrin, antiacids, and aspirin without relief. Reports intermittent tingling to hands and feet bilaterally. History Provided By: Patient    HPI: Desire Song is a 32 y.o. female with a PMH of hypertension, MS, ovarian CA who presents with CP since 3am this morning that feels like \"someone squeezing it\" in the mid chest.  Pt states she took some tums and tylenol then laid down and went back to sleep, woke up around 7a with continued pain so she took motrin 800mg. While driving her son to school she states she started feeling tingling to the hands and feet and around the lips but that has been intermittent. Pt reports some associated SOB and mild cough. Pt rates pain 10/10. PCP: None    Current Facility-Administered Medications   Medication Dose Route Frequency Provider Last Rate Last Dose    iopamidol (ISOVUE-370) 76 % injection 100 mL  100 mL IntraVENous RAD ONCE Rashmi Lyons MD        sodium chloride (NS) flush 5-10 mL  5-10 mL IntraVENous RAD ONCE Rashmi Lyons MD         Current Outpatient Prescriptions   Medication Sig Dispense Refill    acetaminophen (TYLENOL) 325 mg tablet Take 2 Tabs by mouth every four (4) hours as needed for Pain. 30 Tab 0    famotidine (PEPCID) 40 mg tablet Take 1 Tab by mouth nightly. 30 Tab 3    acyclovir (ZOVIRAX) 400 mg tablet TAKE 1 TABLET BY MOUTH TWICE A DAY  4    prenatal vit-iron fumarate-fa (PRENATAL PLUS WITH IRON) 28 mg iron- 800 mcg tab Take 1 Tab by mouth daily.  ferrous sulfate (IRON) 325 mg (65 mg iron) tablet Take  by mouth Daily (before breakfast).  multivitamin (ONE A DAY) tablet Take 1 Tab by mouth daily.       diphenhydrAMINE (BENADRYL) 25 mg capsule Take 2 Caps by mouth every six (6) hours as needed. 30 Cap 0       Past History     Past Medical History:  Past Medical History:   Diagnosis Date    Cancer ([address]     ovarian     Hypertension     with pregnancy    Ill-defined condition     MS    Ill-defined condition     TIMOTHY 1 with last pap       Past Surgical History:  History reviewed. No pertinent surgical history. Family History:  Family History   Problem Relation Age of Onset    Kidney Disease Mother     Cancer Father        Social History:  Social History   Substance Use Topics    Smoking status: Current Some Day Smoker    Smokeless tobacco: Never Used      Comment: not since being pregnant; quit in Nov 2016    Alcohol use No      Comment: occ       Allergies: Allergies   Allergen Reactions    Shellfish Derived Swelling         Review of Systems   Review of Systems   Respiratory: Positive for cough and shortness of breath. Negative for wheezing and stridor. Cardiovascular: Positive for chest pain. Gastrointestinal: Negative for abdominal pain, nausea and vomiting. Skin: Negative. Neurological: Positive for numbness. Negative for speech difficulty and weakness. All other systems reviewed and are negative. Physical Exam     Vitals:    05/21/18 1230 05/21/18 1237 05/21/18 1300 05/21/18 1330   BP: (!) 155/98 (!) 158/97 (!) 156/97 (!) 161/99   Pulse: 85 82 84    Resp: 20 20 20    Temp:       SpO2: 100% 100% 100% 100%   Weight:       Height:         Physical Exam   Constitutional: She is oriented to person, place, and time. She appears well-developed and well-nourished. No distress. HENT:   Head: Normocephalic and atraumatic. Eyes: Conjunctivae are normal.   Neck: Normal range of motion. Cardiovascular: Normal rate, regular rhythm and normal heart sounds. Pulses:       Radial pulses are 2+ on the right side, and 2+ on the left side. Dorsalis pedis pulses are 2+ on the right side, and 2+ on the left side.    Pulmonary/Chest: Effort normal and breath sounds normal. No respiratory distress. She has no wheezes. She has no rales. Neurological: She is alert and oriented to person, place, and time. Skin: Skin is warm and dry. Psychiatric: She has a normal mood and affect. Her behavior is normal. Judgment and thought content normal.   Nursing note and vitals reviewed. at 2:05 PM      Diagnostic Study Results     Labs -     Recent Results (from the past 12 hour(s))   EKG, 12 LEAD, INITIAL    Collection Time: 05/21/18  9:20 AM   Result Value Ref Range    Ventricular Rate 82 BPM    Atrial Rate 82 BPM    P-R Interval 154 ms    QRS Duration 86 ms    Q-T Interval 376 ms    QTC Calculation (Bezet) 439 ms    Calculated P Axis 59 degrees    Calculated R Axis 21 degrees    Calculated T Axis 14 degrees    Diagnosis       Sinus rhythm with marked sinus arrhythmia  Otherwise normal ECG  When compared with ECG of 24-JUL-2017 17:03,  No significant change was found     HCG URINE, QL. - POC    Collection Time: 05/21/18 10:24 AM   Result Value Ref Range    Pregnancy test,urine (POC) NEGATIVE  NEG     CBC WITH AUTOMATED DIFF    Collection Time: 05/21/18 10:34 AM   Result Value Ref Range    WBC 12.1 (H) 3.6 - 11.0 K/uL    RBC 4.21 3.80 - 5.20 M/uL    HGB 11.8 11.5 - 16.0 g/dL    HCT 35.8 35.0 - 47.0 %    MCV 85.0 80.0 - 99.0 FL    MCH 28.0 26.0 - 34.0 PG    MCHC 33.0 30.0 - 36.5 g/dL    RDW 16.1 (H) 11.5 - 14.5 %    PLATELET 610 585 - 309 K/uL    MPV 10.7 8.9 - 12.9 FL    NRBC 0.0 0  WBC    ABSOLUTE NRBC 0.00 0.00 - 0.01 K/uL    NEUTROPHILS 73 32 - 75 %    LYMPHOCYTES 20 12 - 49 %    MONOCYTES 6 5 - 13 %    EOSINOPHILS 0 0 - 7 %    BASOPHILS 0 0 - 1 %    IMMATURE GRANULOCYTES 0 0.0 - 0.5 %    ABS. NEUTROPHILS 8.9 (H) 1.8 - 8.0 K/UL    ABS. LYMPHOCYTES 2.5 0.8 - 3.5 K/UL    ABS. MONOCYTES 0.7 0.0 - 1.0 K/UL    ABS. EOSINOPHILS 0.0 0.0 - 0.4 K/UL    ABS. BASOPHILS 0.0 0.0 - 0.1 K/UL    ABS. IMM.  GRANS. 0.1 (H) 0.00 - 0.04 K/UL    DF AUTOMATED     METABOLIC PANEL, COMPREHENSIVE Collection Time: 05/21/18 10:34 AM   Result Value Ref Range    Sodium 139 136 - 145 mmol/L    Potassium 3.9 3.5 - 5.1 mmol/L    Chloride 105 97 - 108 mmol/L    CO2 25 21 - 32 mmol/L    Anion gap 9 5 - 15 mmol/L    Glucose 100 65 - 100 mg/dL    BUN 9 6 - 20 MG/DL    Creatinine 0.60 0.55 - 1.02 MG/DL    BUN/Creatinine ratio 15 12 - 20      GFR est AA >60 >60 ml/min/1.73m2    GFR est non-AA >60 >60 ml/min/1.73m2    Calcium 9.5 8.5 - 10.1 MG/DL    Bilirubin, total 1.0 0.2 - 1.0 MG/DL    ALT (SGPT) 62 12 - 78 U/L    AST (SGOT) 232 (H) 15 - 37 U/L    Alk. phosphatase 145 (H) 45 - 117 U/L    Protein, total 7.9 6.4 - 8.2 g/dL    Albumin 3.1 (L) 3.5 - 5.0 g/dL    Globulin 4.8 (H) 2.0 - 4.0 g/dL    A-G Ratio 0.6 (L) 1.1 - 2.2     D DIMER    Collection Time: 05/21/18 10:34 AM   Result Value Ref Range    D-dimer 1.61 (H) 0.00 - 0.65 mg/L FEU   TROPONIN I    Collection Time: 05/21/18 10:34 AM   Result Value Ref Range    Troponin-I, Qt. <0.04 <0.05 ng/mL   CK W/ REFLX CKMB    Collection Time: 05/21/18 10:34 AM   Result Value Ref Range    CK 90 26 - 192 U/L       Radiologic Studies -   CTA CHEST W OR W WO CONT   Final Result        CT Results  (Last 48 hours)               05/21/18 1225  CTA CHEST W OR W WO CONT Final result    Impression:  IMPRESSION: No evidence of acute finding or pulmonary embolus or change. Narrative:  EXAM:  CTA CHEST W OR W WO CONT       INDICATION:   Chest pain, acute, pulmonary embolism (PE) suspected       COMPARISON: 7/24/2017. CONTRAST:  95 mL of Isovue-370. TECHNIQUE:    Precontrast  images were obtained to localize the volume for acquisition. Multislice helical CT arteriography was performed from the diaphragm to the   thoracic inlet during uneventful rapid bolus intravenous contrast   administration. Lung and soft tissue windows were generated.   Coronal and   sagittal images were generated and 3D post processing consisting of coronal   maximum intensity images was performed. CT dose reduction was achieved through   use of a standardized protocol tailored for this examination and automatic   exposure control for dose modulation. FINDINGS:   LUNGS:  The lungs are clear of mass, nodule, airspace disease or edema. PLEURA: No pleural effusion or pneumothorax. TRACHEA/BRONCHI: Patent. PULMONARY ARTERIES: The pulmonary arteries are well enhanced and no pulmonary   emboli are identified. MEDIASTINUM/KIMBER: There is no mediastinal or hilar adenopathy or mass. AORTA: The aorta enhances normally without evidence of aneurysm or dissection. UPPER ABDOMEN: The visualized portions of the upper abdominal organs are normal.       BONES: Scoliosis of the thoracolumbar spine noted. CXR Results  (Last 48 hours)    None            Medical Decision Making   I am the first provider for this patient. I reviewed the vital signs, available nursing notes, past medical history, past surgical history, family history and social history. Vital Signs-Reviewed the patient's vital signs. Records Reviewed: Old Medical Records    ED Course:   11:25am  Pt informed of elevated d dimer and plan for CTA    1:50PM  Discussed labs and CTA results with pt. Pt states she is feeling much better. Pain is resolved after morphine. We also discussed keeping log of BP and then f/u with PCP for eval on whether or not meds need to be started. Disposition:  Discharged    DISCHARGE NOTE:   2:04 PM      Care plan outlined and precautions discussed. Patient has no new complaints, changes, or physical findings. Results of labs and CTA were reviewed with the patient. All medications were reviewed with the patient; will d/c home. All of pt's questions and concerns were addressed. Patient was instructed and agrees to follow up with PCP and cards, as well as to return to the ED upon further deterioration. Patient is ready to go home.     Follow-up Information Follow up With Details Comments Contact Info    [facility] Schedule an appointment as soon as possible for a visit in 2 days for blood pressure monitoring and further treatment if needed [address]    Peter Wilson MD Schedule an appointment as soon as possible for a visit on 5/22/2018 for outpatient stress test and further eval [address]  [phone]            Current Discharge Medication List      CONTINUE these medications which have NOT CHANGED    Details   acetaminophen (TYLENOL) 325 mg tablet Take 2 Tabs by mouth every four (4) hours as needed for Pain. Qty: 30 Tab, Refills: 0      famotidine (PEPCID) 40 mg tablet Take 1 Tab by mouth nightly. Qty: 30 Tab, Refills: 3      acyclovir (ZOVIRAX) 400 mg tablet TAKE 1 TABLET BY MOUTH TWICE A DAY  Refills: 4      prenatal vit-iron fumarate-fa (PRENATAL PLUS WITH IRON) 28 mg iron- 800 mcg tab Take 1 Tab by mouth daily. ferrous sulfate (IRON) 325 mg (65 mg iron) tablet Take  by mouth Daily (before breakfast). multivitamin (ONE A DAY) tablet Take 1 Tab by mouth daily. diphenhydrAMINE (BENADRYL) 25 mg capsule Take 2 Caps by mouth every six (6) hours as needed. Qty: 30 Cap, Refills: 0             Provider Notes (Medical Decision Making):   DDX: ACS, GERD, PUD, PE, electrolyte imbalance    Procedures        Diagnosis     Clinical Impression:   1. Chest pain, unspecified type    2.  Elevated blood pressure reading without diagnosis of hypertension

## 2018-07-09 ENCOUNTER — APPOINTMENT (OUTPATIENT)
Dept: ULTRASOUND IMAGING | Age: 28
End: 2018-07-09
Attending: PHYSICIAN ASSISTANT
Payer: COMMERCIAL

## 2018-07-09 ENCOUNTER — HOSPITAL ENCOUNTER (EMERGENCY)
Age: 28
Discharge: HOME | End: 2018-07-09
Attending: EMERGENCY MEDICINE
Payer: COMMERCIAL

## 2018-07-09 VITALS
TEMPERATURE: 98.3 F | SYSTOLIC BLOOD PRESSURE: 126 MMHG | WEIGHT: 234 LBS | HEART RATE: 107 BPM | BODY MASS INDEX: 36.73 KG/M2 | HEIGHT: 67 IN | RESPIRATION RATE: 18 BRPM | OXYGEN SATURATION: 100 % | DIASTOLIC BLOOD PRESSURE: 83 MMHG

## 2018-07-09 DIAGNOSIS — R10.32 ABDOMINAL PAIN, LLQ (LEFT LOWER QUADRANT): ICD-10-CM

## 2018-07-09 DIAGNOSIS — N30.01 ACUTE CYSTITIS WITH HEMATURIA: Primary | ICD-10-CM

## 2018-07-09 DIAGNOSIS — N72 CERVICITIS: ICD-10-CM

## 2018-07-09 DIAGNOSIS — Z20.2 POSSIBLE EXPOSURE TO STD: ICD-10-CM

## 2018-07-09 LAB
ALBUMIN SERPL-MCNC: 2.8 G/DL (ref 3.5–5)
ALBUMIN/GLOB SERPL: 0.5 {RATIO} (ref 1.1–2.2)
ALP SERPL-CCNC: 63 U/L (ref 45–117)
ALT SERPL-CCNC: 8 U/L (ref 12–78)
ANION GAP SERPL CALC-SCNC: 12 MMOL/L (ref 5–15)
APPEARANCE UR: ABNORMAL
AST SERPL-CCNC: 9 U/L (ref 15–37)
BACTERIA URNS QL MICRO: ABNORMAL /HPF
BASOPHILS # BLD: 0 K/UL (ref 0–0.1)
BASOPHILS NFR BLD: 0 % (ref 0–1)
BILIRUB SERPL-MCNC: 0.3 MG/DL (ref 0.2–1)
BILIRUB UR QL: NEGATIVE
BUN SERPL-MCNC: 4 MG/DL (ref 6–20)
BUN/CREAT SERPL: 5 (ref 12–20)
CALCIUM SERPL-MCNC: 8.6 MG/DL (ref 8.5–10.1)
CHLORIDE SERPL-SCNC: 101 MMOL/L (ref 97–108)
CLUE CELLS VAG QL WET PREP: NORMAL
CO2 SERPL-SCNC: 27 MMOL/L (ref 21–32)
COLOR UR: ABNORMAL
CREAT SERPL-MCNC: 0.8 MG/DL (ref 0.55–1.02)
DIFFERENTIAL METHOD BLD: ABNORMAL
EOSINOPHIL # BLD: 0 K/UL (ref 0–0.4)
EOSINOPHIL NFR BLD: 0 % (ref 0–7)
EPITH CASTS URNS QL MICRO: ABNORMAL /LPF
ERYTHROCYTE [DISTWIDTH] IN BLOOD BY AUTOMATED COUNT: 14.8 % (ref 11.5–14.5)
GLOBULIN SER CALC-MCNC: 5.2 G/DL (ref 2–4)
GLUCOSE SERPL-MCNC: 116 MG/DL (ref 65–100)
GLUCOSE UR STRIP.AUTO-MCNC: NEGATIVE MG/DL
HCG UR QL: NEGATIVE
HCT VFR BLD AUTO: 34.5 % (ref 35–47)
HGB BLD-MCNC: 11.5 G/DL (ref 11.5–16)
HGB UR QL STRIP: ABNORMAL
IMM GRANULOCYTES # BLD: 0.1 K/UL (ref 0–0.04)
IMM GRANULOCYTES NFR BLD AUTO: 1 % (ref 0–0.5)
KETONES UR QL STRIP.AUTO: NEGATIVE MG/DL
KOH PREP SPEC: NORMAL
LEUKOCYTE ESTERASE UR QL STRIP.AUTO: ABNORMAL
LIPASE SERPL-CCNC: 67 U/L (ref 73–393)
LYMPHOCYTES # BLD: 2.6 K/UL (ref 0.8–3.5)
LYMPHOCYTES NFR BLD: 22 % (ref 12–49)
MCH RBC QN AUTO: 28.2 PG (ref 26–34)
MCHC RBC AUTO-ENTMCNC: 33.3 G/DL (ref 30–36.5)
MCV RBC AUTO: 84.6 FL (ref 80–99)
MONOCYTES # BLD: 1.3 K/UL (ref 0–1)
MONOCYTES NFR BLD: 11 % (ref 5–13)
NEUTS SEG # BLD: 7.8 K/UL (ref 1.8–8)
NEUTS SEG NFR BLD: 66 % (ref 32–75)
NITRITE UR QL STRIP.AUTO: POSITIVE
NRBC # BLD: 0 K/UL (ref 0–0.01)
NRBC BLD-RTO: 0 PER 100 WBC
PH UR STRIP: 6 [PH] (ref 5–8)
PLATELET # BLD AUTO: 237 K/UL (ref 150–400)
PMV BLD AUTO: 10.2 FL (ref 8.9–12.9)
POTASSIUM SERPL-SCNC: 3 MMOL/L (ref 3.5–5.1)
PROT SERPL-MCNC: 8 G/DL (ref 6.4–8.2)
PROT UR STRIP-MCNC: 30 MG/DL
RBC # BLD AUTO: 4.08 M/UL (ref 3.8–5.2)
RBC #/AREA URNS HPF: ABNORMAL /HPF (ref 0–5)
SERVICE CMNT-IMP: NORMAL
SODIUM SERPL-SCNC: 140 MMOL/L (ref 136–145)
SP GR UR REFRACTOMETRY: 1.02 (ref 1–1.03)
T VAGINALIS VAG QL WET PREP: NORMAL
UA: UC IF INDICATED,UAUC: ABNORMAL
UROBILINOGEN UR QL STRIP.AUTO: 1 EU/DL (ref 0.2–1)
WBC # BLD AUTO: 11.8 K/UL (ref 3.6–11)
WBC URNS QL MICRO: ABNORMAL /HPF (ref 0–4)

## 2018-07-09 PROCEDURE — 74011250636 HC RX REV CODE- 250/636: Performed by: PHYSICIAN ASSISTANT

## 2018-07-09 PROCEDURE — 96372 THER/PROPH/DIAG INJ SC/IM: CPT

## 2018-07-09 PROCEDURE — 99284 EMERGENCY DEPT VISIT MOD MDM: CPT

## 2018-07-09 PROCEDURE — 81025 URINE PREGNANCY TEST: CPT

## 2018-07-09 PROCEDURE — 87210 SMEAR WET MOUNT SALINE/INK: CPT | Performed by: PHYSICIAN ASSISTANT

## 2018-07-09 PROCEDURE — 87491 CHLMYD TRACH DNA AMP PROBE: CPT | Performed by: PHYSICIAN ASSISTANT

## 2018-07-09 PROCEDURE — 74011000250 HC RX REV CODE- 250: Performed by: PHYSICIAN ASSISTANT

## 2018-07-09 PROCEDURE — 80053 COMPREHEN METABOLIC PANEL: CPT | Performed by: PHYSICIAN ASSISTANT

## 2018-07-09 PROCEDURE — 76830 TRANSVAGINAL US NON-OB: CPT

## 2018-07-09 PROCEDURE — 74011250637 HC RX REV CODE- 250/637: Performed by: PHYSICIAN ASSISTANT

## 2018-07-09 PROCEDURE — 36415 COLL VENOUS BLD VENIPUNCTURE: CPT | Performed by: PHYSICIAN ASSISTANT

## 2018-07-09 PROCEDURE — 87186 SC STD MICRODIL/AGAR DIL: CPT | Performed by: PHYSICIAN ASSISTANT

## 2018-07-09 PROCEDURE — 83690 ASSAY OF LIPASE: CPT | Performed by: PHYSICIAN ASSISTANT

## 2018-07-09 PROCEDURE — 81001 URINALYSIS AUTO W/SCOPE: CPT | Performed by: PHYSICIAN ASSISTANT

## 2018-07-09 PROCEDURE — 87086 URINE CULTURE/COLONY COUNT: CPT | Performed by: PHYSICIAN ASSISTANT

## 2018-07-09 PROCEDURE — 85025 COMPLETE CBC W/AUTO DIFF WBC: CPT | Performed by: PHYSICIAN ASSISTANT

## 2018-07-09 PROCEDURE — 87077 CULTURE AEROBIC IDENTIFY: CPT | Performed by: PHYSICIAN ASSISTANT

## 2018-07-09 PROCEDURE — 76856 US EXAM PELVIC COMPLETE: CPT

## 2018-07-09 RX ORDER — ONDANSETRON 4 MG/1
4 TABLET, ORALLY DISINTEGRATING ORAL
Status: COMPLETED | OUTPATIENT
Start: 2018-07-09 | End: 2018-07-09

## 2018-07-09 RX ORDER — CEPHALEXIN 500 MG/1
500 CAPSULE ORAL 3 TIMES DAILY
Qty: 21 CAP | Refills: 0 | Status: SHIPPED | OUTPATIENT
Start: 2018-07-09 | End: 2018-07-16

## 2018-07-09 RX ORDER — AZITHROMYCIN 500 MG/1
1000 TABLET, FILM COATED ORAL
Status: COMPLETED | OUTPATIENT
Start: 2018-07-09 | End: 2018-07-09

## 2018-07-09 RX ADMIN — PHENOBARBITAL ELIXIR 50 ML: 16.2; .1037; .0065; .0194 ELIXIR ORAL at 05:00

## 2018-07-09 RX ADMIN — LIDOCAINE HYDROCHLORIDE 250 MG: 10 INJECTION, SOLUTION EPIDURAL; INFILTRATION; INTRACAUDAL; PERINEURAL at 11:50

## 2018-07-09 RX ADMIN — AZITHROMYCIN 1000 MG: 500 TABLET, FILM COATED ORAL at 11:50

## 2018-07-09 RX ADMIN — ONDANSETRON 4 MG: 4 TABLET, ORALLY DISINTEGRATING ORAL at 09:47

## 2018-07-09 NOTE — ED TRIAGE NOTES
Patient reports she had a fever around 102F intermittent x couple days, left lower abdominal pain x 2 days, was intermittent, now constant, worse when taking a deep breath. Denies nausea, vomiting or diarrhea, denies change in bowel or bladder habits.

## 2018-07-09 NOTE — ED NOTES
Pt c/o abdominal pain and fever for a couple of days. Highest temp 102.8 as per patient. Pt denies V/D, + diarrhea. Emergency Department Nursing Plan of Care       The Nursing Plan of Care is developed from the Nursing assessment and Emergency Department Attending provider initial evaluation. The plan of care may be reviewed in the ED Provider note.     The Plan of Care was developed with the following considerations:   Patient / Family readiness to learn indicated by:verbalized understanding  Persons(s) to be included in education: patient  Barriers to Learning/Limitations:No    Signed     Eddie Crouch RN    7/9/2018   9:14 AM

## 2018-07-09 NOTE — ED NOTES
PIV removed and pt accepted plan of care and left unit steady gait. Pt medicated as per providers orders. Patient (s)  given copy of dc instructions and 1 script(s). Patient (s)  verbalized understanding of instructions and script (s). Patient given a current medication reconciliation form and verbalized understanding of their medications. Patient (s) verbalized understanding of the importance of discussing medications with  his or her physician or clinic they will be following up with. Patient alert and oriented and in no acute distress. Patient discharged home ambulatory with self.

## 2018-07-09 NOTE — ED PROVIDER NOTES
EMERGENCY DEPARTMENT HISTORY AND PHYSICAL EXAM    Date: 7/9/2018  Patient Name: Joanne Marino    History of Presenting Illness     Chief Complaint   Patient presents with    Abdominal Pain         History Provided By: Patient        HPI: Joanne Marino is a 32 y.o. female with a PMH of hypertension and ovarian cancer who presents with left lower sided intermittent abdominal pain for 2 weeks and fevers of 102F starting 2 days ago resolving with tylenol/motrin. Food does not aggravate or alleviate symptoms. Has not taken any medication for her symptoms. Movement makes the pain worse and rest alleviates it. Pt denies any vomiting, diarrhea, change in appetite, dizziness, syncope, urgency, dysuria or hematuria or flank pain    Admits to mild intermittent nausea, pelvic pain, dyspareunia, clear to thin vaginal discharge and urinary frequency     All other ROS negative at this time  Pt is in no acute distress     PCP: None    Current Outpatient Prescriptions   Medication Sig Dispense Refill    acetaminophen (TYLENOL) 325 mg tablet Take 2 Tabs by mouth every four (4) hours as needed for Pain. 30 Tab 0    famotidine (PEPCID) 40 mg tablet Take 1 Tab by mouth nightly. 30 Tab 3    acyclovir (ZOVIRAX) 400 mg tablet TAKE 1 TABLET BY MOUTH TWICE A DAY  4    prenatal vit-iron fumarate-fa (PRENATAL PLUS WITH IRON) 28 mg iron- 800 mcg tab Take 1 Tab by mouth daily.  ferrous sulfate (IRON) 325 mg (65 mg iron) tablet Take  by mouth Daily (before breakfast).  multivitamin (ONE A DAY) tablet Take 1 Tab by mouth daily.  diphenhydrAMINE (BENADRYL) 25 mg capsule Take 2 Caps by mouth every six (6) hours as needed.  30 Cap 0       Past History     Past Medical History:  Past Medical History:   Diagnosis Date    Cancer ([address]     ovarian     Hypertension     with pregnancy    Ill-defined condition     MS    Ill-defined condition     TIMOTHY 1 with last pap       Past Surgical History:  No past surgical history on file.    Family History:  Family History   Problem Relation Age of Onset    Kidney Disease Mother     Cancer Father        Social History:  Social History   Substance Use Topics    Smoking status: Current Some Day Smoker    Smokeless tobacco: Never Used      Comment: not since being pregnant; quit in Nov 2016    Alcohol use No      Comment: occ       Allergies: Allergies   Allergen Reactions    Shellfish Derived Swelling         Review of Systems   Review of Systems   Constitutional: Positive for chills and fever. Negative for appetite change, diaphoresis and fatigue. HENT: Negative. Negative for congestion and trouble swallowing. Eyes: Negative. Negative for pain and redness. Respiratory: Negative. Negative for chest tightness and shortness of breath. Cardiovascular: Negative. Negative for chest pain. Gastrointestinal: Negative. Negative for abdominal pain, diarrhea, nausea and vomiting. Endocrine: Negative. Genitourinary: Positive for dyspareunia, frequency, pelvic pain and vaginal discharge. Negative for decreased urine volume, dysuria, flank pain, hematuria, urgency and vaginal pain. Musculoskeletal: Negative. Negative for back pain and myalgias. Skin: Negative. Allergic/Immunologic: Negative. Neurological: Negative. Negative for headaches. Hematological: Negative. Psychiatric/Behavioral: Negative. All other systems reviewed and are negative. Physical Exam     Vitals:    07/09/18 0900   BP: 126/83   Pulse: (!) 107   Resp: 18   Temp: 98.3 °F (36.8 °C)   SpO2: 100%   Weight: 106.1 kg (234 lb)   Height: 5' 7\" (1.702 m)     Physical Exam   Constitutional: She is oriented to person, place, and time. She appears well-developed and well-nourished. No distress. HENT:   Head: Normocephalic and atraumatic. Right Ear: External ear normal.   Left Ear: External ear normal.   Eyes: Conjunctivae and EOM are normal. Pupils are equal, round, and reactive to light.    Neck: Normal range of motion. No tracheal deviation present. Cardiovascular: Normal rate, regular rhythm and normal heart sounds. Pulmonary/Chest: Effort normal and breath sounds normal. No respiratory distress. She has no wheezes. Abdominal: Soft. Bowel sounds are normal. She exhibits no distension. There is tenderness in the left lower quadrant. There is no CVA tenderness. Musculoskeletal: Normal range of motion. She exhibits no edema, tenderness or deformity. Lymphadenopathy:     She has no cervical adenopathy. Neurological: She is alert and oriented to person, place, and time. She has normal reflexes. Skin: Skin is warm and dry. She is not diaphoretic. No pallor. Psychiatric: She has a normal mood and affect. Her behavior is normal. Judgment and thought content normal.   Nursing note and vitals reviewed. Diagnostic Study Results     Labs -   No results found for this or any previous visit (from the past 12 hour(s)). Radiologic Studies -   No orders to display     CT Results  (Last 48 hours)    None        CXR Results  (Last 48 hours)    None            Medical Decision Making   I am the first provider for this patient. I reviewed the vital signs, available nursing notes, past medical history, past surgical history, family history and social history. Vital Signs-Reviewed the patient's vital signs. Records Reviewed: Nursing Notes    ED Course:     Disposition:  Discharged     DISCHARGE NOTE:     Care plan outlined and precautions discussed. Patient has no new complaints, changes, or physical findings. Results of exam/labs were reviewed with the patient. All medications were reviewed with the patient; will d/c home with rx. All of pt's questions and concerns were addressed. Patient was instructed and agrees to follow up with pcp, as well as to return to the ED upon further deterioration. Patient is ready to go home.     Follow-up Information     None          Current Discharge Medication List          Provider Notes (Medical Decision Making):   DDX: PID, pancreatitis, uti, ovarian cyst/rupture    Do to CMT and pelvic pain will treat for gc  Given abx for uti    Worsening si/sxs discussed extensively   Follow up with PCP or RTC if symptoms/signs worsen  Side effects of medication discussed  Education materials provided at discharge   Pt verbalizes agreement with plan    Procedures:  Pelvic Exam  Date/Time: 7/9/2018 9:47 AM  Performed by: PA  Exam assisted by:  ED Tech. Type of exam performed: bimanual and speculum. External genitalia appearance: normal.    Vaginal exam:  normal.    Cervical exam:  moderate cervical motion tenderness and os closed. Specimen(s) collected:  GC, chlamydia and vaginal culture. Bimanual exam:  left adenexal tenderness. Patient tolerance: Patient tolerated the procedure well with no immediate complications                Diagnosis     Clinical Impression: No diagnosis found.

## 2018-07-09 NOTE — DISCHARGE INSTRUCTIONS
Cervicitis: Care Instructions  Your Care Instructions    Cervicitis means that your cervix is inflamed. The cervix is the part of your uterus that opens into your vagina. This problem is most often caused by an infection. Some women get it after they have a sexually transmitted infection (STI). These include gonorrhea and chlamydia. It can also be caused by irritation from some types of birth control. Two examples are the cervical cap or diaphragm. Your doctor may do some tests to help find the cause of the problem. It is very important to treat cervicitis. If you don't, you could have serious health problems. For this reason, you may need a test after your treatment to make sure the infection is gone. Follow-up care is a key part of your treatment and safety. Be sure to make and go to all appointments, and call your doctor if you are having problems. It's also a good idea to know your test results and keep a list of the medicines you take. How can you care for yourself at home? · Take your antibiotics as directed. Do not stop taking them just because you feel better. You need to take the full course of antibiotics. · If your doctor prescribed antifungal medicine, use it as directed. · While you are being treated, do not have sex. If your treatment is one dose of antibiotics, wait at least 7 days after you take your medicine before you have any kind of sexual contact. Even if you use a condom, you could get infected again. · It's important to tell your sex partner or partners that you have cervicitis. It may be related to an STI. Any partners should get tested and then treated if they have an STI. This is true even if they don't have symptoms. · Do not douche. It can change the normal balance of substances in your vagina. · Do not use tampons while you are being treated. To prevent STIs  · Use latex condoms every time you have sex. Use them from the start to the end of sexual contact.   · Talk to your partner before you have sex. Find out if he or she has or is at risk for any sexually transmitted infection (STI). Keep in mind that a person may be able to spread an STI even if he or she does not have symptoms. · Do not have sex with anyone who has symptoms of an STI. These include sores on the genitals or mouth. · Having one sex partner (who does not have STIs and does not have sex with anyone else) is a good way to avoid STIs. When should you call for help? Call your doctor now or seek immediate medical care if:  ? · You have new or worse pain in your belly or pelvis. ? · You have vaginal discharge that has increased in amount or smells bad.   ? · You have unusual vaginal bleeding. ? · You have a new or higher fever. ? Watch closely for changes in your health, and be sure to contact your doctor if:  ? · You do not get better as expected. Where can you learn more? Go to http://danielle-irais.info/. Enter I070 in the search box to learn more about \"Cervicitis: Care Instructions. \"  Current as of: May 12, 2017  Content Version: 11.4  © 2885-8518 [employer]. Care instructions adapted under license by [employer] (which disclaims liability or warranty for this information). If you have questions about a medical condition or this instruction, always ask your healthcare professional. [address] any warranty or liability for your use of this information. Abdominal Pain: Care Instructions  Your Care Instructions    Abdominal pain has many possible causes. Some aren't serious and get better on their own in a few days. Others need more testing and treatment. If your pain continues or gets worse, you need to be rechecked and may need more tests to find out what is wrong. You may need surgery to correct the problem. Don't ignore new symptoms, such as fever, nausea and vomiting, urination problems, pain that gets worse, and dizziness.  These may be signs of a more serious problem. Your doctor may have recommended a follow-up visit in the next 8 to 12 hours. If you are not getting better, you may need more tests or treatment. The doctor has checked you carefully, but problems can develop later. If you notice any problems or new symptoms, get medical treatment right away. Follow-up care is a key part of your treatment and safety. Be sure to make and go to all appointments, and call your doctor if you are having problems. It's also a good idea to know your test results and keep a list of the medicines you take. How can you care for yourself at home? · Rest until you feel better. · To prevent dehydration, drink plenty of fluids, enough so that your urine is light yellow or clear like water. Choose water and other caffeine-free clear liquids until you feel better. If you have kidney, heart, or liver disease and have to limit fluids, talk with your doctor before you increase the amount of fluids you drink. · If your stomach is upset, eat mild foods, such as rice, dry toast or crackers, bananas, and applesauce. Try eating several small meals instead of two or three large ones. · Wait until 48 hours after all symptoms have gone away before you have spicy foods, alcohol, and drinks that contain caffeine. · Do not eat foods that are high in fat. · Avoid anti-inflammatory medicines such as aspirin, ibuprofen (Advil, Motrin), and naproxen (Aleve). These can cause stomach upset. Talk to your doctor if you take daily aspirin for another health problem. When should you call for help? Call 911 anytime you think you may need emergency care. For example, call if:  ? · You passed out (lost consciousness). ? · You pass maroon or very bloody stools. ? · You vomit blood or what looks like coffee grounds. ? · You have new, severe belly pain.    ?Call your doctor now or seek immediate medical care if:  ? · Your pain gets worse, especially if it becomes focused in one area of your belly. ? · You have a new or higher fever. ? · Your stools are black and look like tar, or they have streaks of blood. ? · You have unexpected vaginal bleeding. ? · You have symptoms of a urinary tract infection. These may include:  ¨ Pain when you urinate. ¨ Urinating more often than usual.  ¨ Blood in your urine. ? · You are dizzy or lightheaded, or you feel like you may faint. ? Watch closely for changes in your health, and be sure to contact your doctor if:  ? · You are not getting better after 1 day (24 hours). Where can you learn more? Go to http://danielle-irais.info/. Enter Q994 in the search box to learn more about \"Abdominal Pain: Care Instructions. \"  Current as of: March 20, 2017  Content Version: 11.4  © 6959-6800 [employer]. Care instructions adapted under license by [employer] (which disclaims liability or warranty for this information). If you have questions about a medical condition or this instruction, always ask your healthcare professional. [address] any warranty or liability for your use of this information.

## 2018-07-11 LAB
BACTERIA SPEC CULT: ABNORMAL
C TRACH DNA SPEC QL NAA+PROBE: NEGATIVE
CC UR VC: ABNORMAL
N GONORRHOEA DNA SPEC QL NAA+PROBE: NEGATIVE
SAMPLE TYPE: NORMAL
SERVICE CMNT-IMP: ABNORMAL
SERVICE CMNT-IMP: NORMAL
SPECIMEN SOURCE: NORMAL

## 2018-10-23 ENCOUNTER — APPOINTMENT (OUTPATIENT)
Dept: GENERAL RADIOLOGY | Age: 28
End: 2018-10-23
Attending: PHYSICIAN ASSISTANT
Payer: COMMERCIAL

## 2018-10-23 ENCOUNTER — HOSPITAL ENCOUNTER (EMERGENCY)
Age: 28
Discharge: HOME | End: 2018-10-23
Attending: EMERGENCY MEDICINE
Payer: COMMERCIAL

## 2018-10-23 VITALS
OXYGEN SATURATION: 100 % | WEIGHT: 235 LBS | HEART RATE: 83 BPM | HEIGHT: 67 IN | SYSTOLIC BLOOD PRESSURE: 141 MMHG | TEMPERATURE: 98 F | RESPIRATION RATE: 16 BRPM | DIASTOLIC BLOOD PRESSURE: 90 MMHG | BODY MASS INDEX: 36.88 KG/M2

## 2018-10-23 DIAGNOSIS — S93.601A SPRAIN OF RIGHT FOOT, INITIAL ENCOUNTER: Primary | ICD-10-CM

## 2018-10-23 PROCEDURE — 77030013175 HC SHOE PSTOP DJOR -A

## 2018-10-23 PROCEDURE — 99283 EMERGENCY DEPT VISIT LOW MDM: CPT

## 2018-10-23 PROCEDURE — 73630 X-RAY EXAM OF FOOT: CPT

## 2018-10-23 RX ORDER — NAPROXEN 500 MG/1
500 TABLET ORAL
Qty: 20 TAB | Refills: 0 | Status: SHIPPED | OUTPATIENT
Start: 2018-10-23 | End: 2018-11-02

## 2018-10-23 NOTE — ED TRIAGE NOTES
Right foot pain from a fall x2 days ago. Pt reports falling down the stairs and landed wrong on her ankle. Pt reports taking tylenol.

## 2018-10-23 NOTE — LETTER
[facility] EMERGENCY DEPT 
[address] [facility] [address] [id number] [phone] Work/School Note Date: 10/23/2018 To Whom It May concern: 
 
Claudetta Sinks was seen and treated today in the emergency room by the following provider(s): 
Attending Provider: Chandu Painting MD 
Physician Assistant: Paulino Red PA-C. Claudetta Sinks may return to work on 10/25/18. Sincerely, Bárbara Cardoso PA-C

## 2018-10-23 NOTE — DISCHARGE INSTRUCTIONS
Foot Sprain: Care Instructions  Your Care Instructions    A foot sprain occurs when you stretch or tear the ligaments around your foot. Ligaments are the tough tissues that connect one bone to another. A sprain can happen when you run, fall, or hit your toe against something. Sprains often happen when you jump or change direction quickly. This may occur when you play basketball, soccer, or other sports. Most foot sprains will get better with treatment at home. Follow-up care is a key part of your treatment and safety. Be sure to make and go to all appointments, and call your doctor if you are having problems. It's also a good idea to know your test results and keep a list of the medicines you take. How can you care for yourself at home? · Walk or put weight on your sprained foot as long as it does not hurt. · If your doctor gave you a splint or immobilizer, wear it as directed. If you were given crutches, use them as directed. · For the first 2 days after your injury, avoid hot showers, hot tubs, or hot packs. They may increase swelling. · Put ice or a cold pack on your foot for 10 to 20 minutes at a time to stop swelling. Try this every 1 to 2 hours for 3 days (when you are awake) or until the swelling goes down. Put a thin cloth between the ice pack and your skin. Keep your splint dry. · After 2 or 3 days, if your swelling is gone, put a heating pad (set on low) or a warm cloth on your foot. Some doctors suggest that you go back and forth between hot and cold treatments. · Prop up your foot on a pillow when you ice it or anytime you sit or lie down. Try to keep it above the level of your heart. This will help reduce swelling. · Take pain medicines exactly as directed. ? If the doctor gave you a prescription medicine for pain, take it as prescribed. ? If you are not taking a prescription pain medicine, ask your doctor if you can take an over-the-counter medicine.   · Do any exercises that your doctor or physical therapist suggests. · Return to your usual exercise gradually as you feel better. When should you call for help? Call your doctor now or seek immediate medical care if:    · You have increased or severe pain.     · Your toes are cool or pale or change color.     · Your wrap or splint feels too tight.     · You have signs of a blood clot, such as:  ? Pain in your calf, back of the knee, thigh, or groin. ? Redness and swelling in your leg or groin.     · You have tingling, weakness, or numbness in your leg or foot.    Watch closely for changes in your health, and be sure to contact your doctor if:    · You cannot put any weight on your foot.     · You get a fever.     · You do not get better as expected. Where can you learn more? Go to http://danielle-irais.info/. Enter I751 in the search box to learn more about \"Foot Sprain: Care Instructions. \"  Current as of: November 29, 2017  Content Version: 11.8  © 7860-0942 [employer]. Care instructions adapted under license by [employer] (which disclaims liability or warranty for this information). If you have questions about a medical condition or this instruction, always ask your healthcare professional. [address] any warranty or liability for your use of this information. Learning About RICE (Rest, Ice, Compression, and Elevation)  What is RICE? RICE is a way to care for an injury. RICE helps relieve pain and swelling. It may also help with healing and flexibility. RICE stands for:  · Rest and protect the injured or sore area. · Ice or a cold pack used as soon as possible. · Compression, or wrapping the injured or sore area with an elastic bandage. · Elevation (propping up) the injured or sore area. How do you do RICE? You can use RICE for home treatment when you have general aches and pains or after an injury or surgery.   Rest  · Do not put weight on the injury for at least 24 to 48 hours. · Use crutches for a badly sprained knee or ankle. · Support a sprained wrist, elbow, or shoulder with a sling. Ice  · Put ice or a cold pack on the injury right away to reduce pain and swelling. Frozen vegetables will also work as an ice pack. Put a thin cloth between the ice or cold pack and your skin. The cloth protects the injured area from getting too cold. · Use ice for 10 to 15 minutes at a time for the first 48 to 72 hours. Compression  · Use compression for sprains, strains, and surgeries of the arms and legs. · Wrap the injured area with an elastic bandage or compression sleeve to reduce swelling. · Don't wrap it too tightly. If the area below it feels numb, tingles, or feels cool, loosen the wrap. Elevation  · Use elevation for areas of the body that can be propped up, such as arms and legs. · Prop up the injured area on pillows whenever you use ice. Keep it propped up anytime you sit or lie down. · Try to keep the injured area at or above the level of your heart. This will help reduce swelling and bruising. Where can you learn more? Go to http://danielle-irais.info/. Enter H698 in the search box to learn more about \"Learning About RICE (Rest, Ice, Compression, and Elevation). \"  Current as of: November 29, 2017  Content Version: 11.8  © 2428-5407 [employer]. Care instructions adapted under license by [employer] (which disclaims liability or warranty for this information). If you have questions about a medical condition or this instruction, always ask your healthcare professional. [place] [address] any warranty or liability for your use of this information.

## 2018-10-23 NOTE — ED PROVIDER NOTES
EMERGENCY DEPARTMENT HISTORY AND PHYSICAL EXAM 
 
Date: 10/23/2018 Patient Name: Glo Davis History of Presenting Illness Chief Complaint Patient presents with  Foot Pain History Provided By: Patient HPI: Glo Davis is a 25 y.o. female with a PMH of MS, ovarian CA who presents with R foot pain after falling down steps 3 days ago. Pt states pain worsened this morning and is worse with ambulation. Pt states she tried some asper creme with minimal relief. Pt rates pain 7/10 and aching. PCP: None Current Outpatient Medications Medication Sig Dispense Refill  naproxen (NAPROSYN) 500 mg tablet Take 1 Tab by mouth two (2) times daily as needed for Pain for up to 10 days. 20 Tab 0  
 acetaminophen (TYLENOL) 325 mg tablet Take 2 Tabs by mouth every four (4) hours as needed for Pain. 30 Tab 0  
 famotidine (PEPCID) 40 mg tablet Take 1 Tab by mouth nightly. 30 Tab 3  
 acyclovir (ZOVIRAX) 400 mg tablet TAKE 1 TABLET BY MOUTH TWICE A DAY  4  
 prenatal vit-iron fumarate-fa (PRENATAL PLUS WITH IRON) 28 mg iron- 800 mcg tab Take 1 Tab by mouth daily.  ferrous sulfate (IRON) 325 mg (65 mg iron) tablet Take  by mouth Daily (before breakfast).  multivitamin (ONE A DAY) tablet Take 1 Tab by mouth daily.  diphenhydrAMINE (BENADRYL) 25 mg capsule Take 2 Caps by mouth every six (6) hours as needed. 30 Cap 0 Past History Past Medical History: 
Past Medical History:  
Diagnosis Date  Cancer ([facility] [address]   
 ovarian  Hypertension   
 with pregnancy  Ill-defined condition MS  
 Ill-defined condition TIMOTHY 1 with last pap Past Surgical History: 
History reviewed. No pertinent surgical history. Family History: 
Family History Problem Relation Age of Onset  Kidney Disease Mother  Cancer Father Social History: 
Social History Tobacco Use  Smoking status: Former Smoker  Smokeless tobacco: Never Used Substance Use Topics  Alcohol use: Yes Comment: occ  Drug use: No  
 
 
Allergies: Allergies Allergen Reactions  Shellfish Derived Swelling Review of Systems Review of Systems Musculoskeletal: Positive for arthralgias and gait problem. Skin: Negative. Neurological: Negative for speech difficulty and weakness. Psychiatric/Behavioral: Positive for self-injury. All other systems reviewed and are negative. Physical Exam  
 
Vitals:  
 10/23/18 1111 BP: 141/90 Pulse: 83 Resp: 16 Temp: 98 °F (36.7 °C) SpO2: 100% Weight: 106.6 kg (235 lb) Height: 5' 7\" (1.702 m) Physical Exam  
Constitutional: She is oriented to person, place, and time. She appears well-developed and well-nourished. No distress. HENT:  
Head: Normocephalic and atraumatic. Eyes: Conjunctivae are normal.  
Cardiovascular: Normal rate, regular rhythm and normal heart sounds. Pulses: 
     Dorsalis pedis pulses are 2+ on the right side, and 2+ on the left side. Pulmonary/Chest: Effort normal and breath sounds normal. No respiratory distress. She has no wheezes. She has no rales. Musculoskeletal:  
     Right ankle: She exhibits normal range of motion, no swelling, no ecchymosis, no deformity and normal pulse. No lateral malleolus and no medial malleolus tenderness found. Right foot: There is bony tenderness (along proximal 5th metatarsal). There is normal range of motion, no swelling, normal capillary refill, no crepitus and no deformity. Neurological: She is alert and oriented to person, place, and time. Skin: Skin is warm and dry. Psychiatric: She has a normal mood and affect. Her behavior is normal. Judgment and thought content normal.  
Nursing note and vitals reviewed. at 11:50 AM 
 
Diagnostic Study Results Labs - No results found for this or any previous visit (from the past 12 hour(s)). Radiologic Studies -  
XR FOOT RT MIN 3 V Final Result CT Results  (Last 48 hours) None CXR Results  (Last 48 hours) None XR FOOT RT MIN 3 V (Final result) Result time 10/23/18 11:40:48 Final result by Kobe Burnham Results In (10/23/18 11:40:12) Initial Result:  
Impression:  
 IMPRESSION: Nonspecific forefoot soft tissue swelling. No fracture seen. Narrative: EXAM:  XR FOOT RT MIN 3 V 
 
INDICATION: Right foot pain,  fall 3 days ago. COMPARISON:  [id number] FINDINGS:  Three views of the right foot demonstrate no fracture or other acute 
osseous or articular abnormality. There is forefoot soft tissue swelling. Medical Decision Making I am the first provider for this patient. I reviewed the vital signs, available nursing notes, past medical history, past surgical history, family history and social history. Vital Signs-Reviewed the patient's vital signs. Disposition: 
Discharged DISCHARGE NOTE:  
3867 PM 
 
  Care plan outlined and precautions discussed. Patient has no new complaints, changes, or physical findings. Results of xrays were reviewed with the patient. All medications were reviewed with the patient; will d/c home. All of pt's questions and concerns were addressed. Patient was instructed and agrees to follow up with podiatry prn, as well as to return to the ED upon further deterioration. Patient is ready to go home. Follow-up Information Follow up With Specialties Details Why Contact Info Mauro Krishna DPM Podiatry Schedule an appointment as soon as possible for a visit in 1 week As needed-foot doctor [address] [place] [address] [facility] Eugenia Lopez 13 
[phone] Discharge Medication List as of 10/23/2018 12:03 PM  
  
START taking these medications Details  
naproxen (NAPROSYN) 500 mg tablet Take 1 Tab by mouth two (2) times daily as needed for Pain for up to 10 days. , Normal, Disp-20 Tab, R-0  
  
  
CONTINUE these medications which have NOT CHANGED Details  
acetaminophen (TYLENOL) 325 mg tablet Take 2 Tabs by mouth every four (4) hours as needed for Pain., Print, Disp-30 Tab, R-0  
  
famotidine (PEPCID) 40 mg tablet Take 1 Tab by mouth nightly., Normal, Disp-30 Tab, R-3  
  
acyclovir (ZOVIRAX) 400 mg tablet TAKE 1 TABLET BY MOUTH TWICE A DAY, Historical Med, R-4  
  
prenatal vit-iron fumarate-fa (PRENATAL PLUS WITH IRON) 28 mg iron- 800 mcg tab Take 1 Tab by mouth daily. , Historical Med  
  
ferrous sulfate (IRON) 325 mg (65 mg iron) tablet Take  by mouth Daily (before breakfast). , Historical Med  
  
multivitamin (ONE A DAY) tablet Take 1 Tab by mouth daily. , Historical Med  
  
diphenhydrAMINE (BENADRYL) 25 mg capsule Take 2 Caps by mouth every six (6) hours as needed. , Print, Disp-30 Cap, R-0 Provider Notes (Medical Decision Making): DDX: foot fracture, strain, sprain,  
 
Procedures Diagnosis Clinical Impression: 1. Sprain of right foot, initial encounter

## 2019-01-26 ENCOUNTER — APPOINTMENT (OUTPATIENT)
Dept: GENERAL RADIOLOGY | Age: 29
End: 2019-01-26
Attending: EMERGENCY MEDICINE
Payer: COMMERCIAL

## 2019-01-26 ENCOUNTER — HOSPITAL ENCOUNTER (EMERGENCY)
Age: 29
Discharge: HOME | End: 2019-01-26
Attending: EMERGENCY MEDICINE
Payer: COMMERCIAL

## 2019-01-26 VITALS
HEIGHT: 63 IN | OXYGEN SATURATION: 99 % | DIASTOLIC BLOOD PRESSURE: 93 MMHG | RESPIRATION RATE: 12 BRPM | WEIGHT: 246 LBS | TEMPERATURE: 98 F | HEART RATE: 70 BPM | BODY MASS INDEX: 43.59 KG/M2 | SYSTOLIC BLOOD PRESSURE: 130 MMHG

## 2019-01-26 DIAGNOSIS — M25.572 ACUTE LEFT ANKLE PAIN: Primary | ICD-10-CM

## 2019-01-26 PROCEDURE — 99283 EMERGENCY DEPT VISIT LOW MDM: CPT

## 2019-01-26 PROCEDURE — 74011250637 HC RX REV CODE- 250/637: Performed by: EMERGENCY MEDICINE

## 2019-01-26 PROCEDURE — L4350 ANKLE CONTROL ORTHO PRE OTS: HCPCS

## 2019-01-26 PROCEDURE — 73610 X-RAY EXAM OF ANKLE: CPT

## 2019-01-26 RX ORDER — ETONOGESTREL AND ETHINYL ESTRADIOL .12; .015 MG/D; MG/D
INSERT, EXTENDED RELEASE VAGINAL
Refills: 0 | Status: ON HOLD | COMMUNITY
Start: 2019-01-02 | End: 2019-06-13

## 2019-01-26 RX ORDER — NAPROXEN 500 MG/1
500 TABLET ORAL
Qty: 20 TAB | Refills: 0 | Status: ON HOLD | OUTPATIENT
Start: 2019-01-26 | End: 2019-06-13

## 2019-01-26 RX ORDER — FAMOTIDINE 20 MG/1
TABLET, FILM COATED ORAL
Refills: 11 | COMMUNITY
Start: 2019-01-04 | End: 2019-01-26

## 2019-01-26 RX ORDER — NAPROXEN 250 MG/1
500 TABLET ORAL
Status: COMPLETED | OUTPATIENT
Start: 2019-01-26 | End: 2019-01-26

## 2019-01-26 RX ADMIN — NAPROXEN 500 MG: 250 TABLET ORAL at 09:24

## 2019-01-26 NOTE — ED PROVIDER NOTES
Patient Name: Kaylee Shepard History of Presenting Illness Chief Complaint Patient presents with  Foot Pain History Provided By: Patient HPI: Kaylee Shepard, 25 y.o. female with PMHx significant for MS and asthma, presents ambulatory to the ED with cc of new onset, constant left ankle pain and swelling beginning early this morning. 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 When pt stepped down on left foot, she felt acute onset of a pressure/sharp like pain. Pt denies any recent trauma/injuries of the left foot. Pt tried using icy/hot, tylenol, and an ace wrap with little relief in pain when she got home this morning. This prompted pt to come to the ED. Pt notes pain worsens when ambulating/placing weight on her left foot and when she dorsiflexes the foot. Pt specifically denies fever, chills, and numbness. Social Hx: - Tobacco, + EtOH, - Illicit Drugs There are no other complaints, changes, or physical findings at this time. PCP: None Current Outpatient Medications Medication Sig Dispense Refill  naproxen (NAPROSYN) 500 mg tablet Take 1 Tab by mouth every twelve (12) hours as needed for Pain. 20 Tab 0  
 NUVARING 0.12-0.015 mg/24 hr vaginal ring INSERT 1 RING VAGINALLY AS DIRECTED. REMOVE AFTER 3 WEEKS & WAIT 7 DAYS BEFORE INSERTING A NEW RING  0  
 acetaminophen (TYLENOL) 325 mg tablet Take 2 Tabs by mouth every four (4) hours as needed for Pain. 30 Tab 0  
 famotidine (PEPCID) 40 mg tablet Take 1 Tab by mouth nightly. 30 Tab 3  
 acyclovir (ZOVIRAX) 400 mg tablet TAKE 1 TABLET BY MOUTH TWICE A DAY  4  
 prenatal vit-iron fumarate-fa (PRENATAL PLUS WITH IRON) 28 mg iron- 800 mcg tab Take 1 Tab by mouth daily.  ferrous sulfate (IRON) 325 mg (65 mg iron) tablet Take  by mouth Daily (before breakfast).  multivitamin (ONE A DAY) tablet Take 1 Tab by mouth daily. Past History Past Medical History: 
Past Medical History: Diagnosis Date  Cancer ([facility] [address]   
 ovarian  Hypertension   
 with pregnancy  Ill-defined condition MS  
 Ill-defined condition TIMOTHY 1 with last pap Past Surgical History: No past surgical history on file. Family History: 
Family History Problem Relation Age of Onset  Kidney Disease Mother  Cancer Father Social History: 
Social History Tobacco Use  Smoking status: Former Smoker  Smokeless tobacco: Never Used Substance Use Topics  Alcohol use: Yes Comment: occ  Drug use: No  
 
 
Allergies: Allergies Allergen Reactions  Shellfish Derived Swelling Review of Systems Review of Systems Constitutional: Negative for chills and fever. HENT: Negative for congestion, rhinorrhea, sneezing and sore throat. Respiratory: Negative for shortness of breath. Cardiovascular: Negative for chest pain. Gastrointestinal: Negative for abdominal pain, nausea and vomiting. Musculoskeletal: Positive for arthralgias (left ankle) and joint swelling (left ankle). Negative for back pain, myalgias and neck stiffness. Skin: Negative for rash. Neurological: Negative for dizziness, weakness, numbness and headaches. All other systems reviewed and are negative. Physical Exam  
Physical Exam  
Constitutional: She is oriented to person, place, and time. She appears well-developed and well-nourished. No distress. HENT:  
Head: Normocephalic and atraumatic. Mouth/Throat: Oropharynx is clear and moist. No oropharyngeal exudate. Eyes: Conjunctivae and EOM are normal. Pupils are equal, round, and reactive to light. Left eye exhibits no discharge. Neck: Normal range of motion. Neck supple. No JVD present. Cardiovascular: Normal rate, regular rhythm, normal heart sounds and intact distal pulses. Pulmonary/Chest: Effort normal and breath sounds normal. No respiratory distress. She has no wheezes. Abdominal: Soft. Bowel sounds are normal. She exhibits no distension. There is no tenderness. There is no rebound and no guarding. Musculoskeletal: Normal range of motion. She exhibits edema (mild; left ankle) and tenderness (left medial malleolus). Left foot: There is normal range of motion (but pain with ROM). Lymphadenopathy:  
  She has no cervical adenopathy. Neurological: She is alert and oriented to person, place, and time. She has normal reflexes. No cranial nerve deficit. Skin: Skin is warm and dry. No rash noted. Psychiatric: She has a normal mood and affect. Her behavior is normal.  
Nursing note and vitals reviewed. Diagnostic Study Results Labs - No results found for this or any previous visit (from the past 12 hour(s)). Radiologic Studies -  
XR ANKLE LT MIN 3 V Final Result IMPRESSION: Soft tissue swelling. No acute osseous abnormality. Final result by Fan Munoz MD (01/26/19 10:02:33) Impression:  
 IMPRESSION: Soft tissue swelling. No acute osseous abnormality. 
   
  
  
  
Narrative: EXAM:  XR ANKLE LT MIN 3 V. 
INDICATION:  Left medial ankle pain and swelling since last night, no known 
injury, TTP over the medial malleolus. COMPARISON: None. FINDINGS: Three views of the ankle demonstrate marked soft tissue swelling. There is no disruption of the ankle mortise.   No other acute osseous or 
articular abnormalities are identified. The soft tissues are within normal 
limits. Medical Decision Making I am the first provider for this patient. I reviewed the vital signs, available nursing notes, past medical history, past surgical history, family history and social history. Vital Signs-Reviewed the patient's vital signs. Patient Vitals for the past 12 hrs: 
 Temp Pulse Resp BP SpO2  
01/26/19 0903 98 °F (36.7 °C) 70 12 (!) 130/93 99 % Pulse Oximetry Analysis - 99% on RA Cardiac Monitor: Rate:  70 bpm 
Rhythm: Normal Sinus Rhythm Records Reviewed: Nursing Notes and Old Medical Records Provider Notes (Medical Decision Making): DDx: fx, sprain, strain, arthritis ED Course:  
Initial assessment performed. The patients presenting problems have been discussed, and they are in agreement with the care plan formulated and outlined with them. I have encouraged them to ask questions as they arise throughout their visit. ED Course as of Jan 26 1021 Sat Jan 26, 2019  
1021 Discussed imaging results; will discharge home. Written by Spenser Man ED Scribe as dictated by Written by Spenser Man ED Scribe as dictated by Thomas Breen MD  [KW] ED Course User Index 
[KW] Ella Dopp Critical Care Time: 0 minutes Disposition: 
Discharge Note: 
10:22 AM 
The pt is ready for discharge. The pt's signs, symptoms, diagnosis, and discharge instructions have been discussed and pt has conveyed their understanding. The pt is to follow up as recommended or return to ER should their symptoms worsen. Plan has been discussed and pt is in agreement. PLAN: 
1. Discharge Home Current Discharge Medication List  
  
START taking these medications Details  
naproxen (NAPROSYN) 500 mg tablet Take 1 Tab by mouth every twelve (12) hours as needed for Pain. Qty: 20 Tab, Refills: 0  
  
  
 
2. Follow-up Information Follow up With Specialties Details Why Contact Info [facility]  Schedule an appointment as soon as possible for a visit As needed [address] [place] [address] EMERGENCY DEPT Emergency Medicine  As needed, If symptoms worsen [address] Return to ED if worse Diagnosis Clinical Impression: 1. Acute left ankle pain Attestations:  
 
This note is prepared by Spenser Man, acting as Scribe for Thomas Breen MD. 
 
 The scribe's documentation has been prepared under my direction and personally reviewed by me in its entirety. I confirm that the note above accurately reflects all work, treatment, procedures, and medical decision making performed by me.  
Krunal Leon MD

## 2019-01-26 NOTE — LETTER
[facility] EMERGENCY DEPT 
[address] [id number] 
[phone] Work/School Note Date: 1/26/2019 To Whom It May concern: 
 
Westley Reddy was seen and treated today in the emergency room by the following provider(s): 
Attending Provider: Catherine lAvarez MD.   
 
Westley Reddy may return to work on 01/28/2019.  
 
Sincerely, 
 
 
 
 
Miguelito Floyd MD

## 2019-01-26 NOTE — DISCHARGE INSTRUCTIONS
Patient Education        Ankle Sprain: Care Instructions  Your Care Instructions    An ankle sprain can happen when you twist your ankle. The ligaments that support the ankle can get stretched and torn. Often the ankle is swollen and painful. Ankle sprains may take from several weeks to several months to heal. Usually, the more pain and swelling you have, the more severe your ankle sprain is and the longer it will take to heal. You can heal faster and regain strength in your ankle with good home treatment. It is very important to give your ankle time to heal completely, so that you do not easily hurt your ankle again. Follow-up care is a key part of your treatment and safety. Be sure to make and go to all appointments, and call your doctor if you are having problems. It's also a good idea to know your test results and keep a list of the medicines you take. How can you care for yourself at home? · Prop up your foot on pillows as much as possible for the next 3 days. Try to keep your ankle above the level of your heart. This will help reduce the swelling. · Follow your doctor's directions for wearing a splint or elastic bandage. Wrapping the ankle may help reduce or prevent swelling. · Your doctor may give you a splint, a brace, an air stirrup, or another form of ankle support to protect your ankle until it is healed. Wear it as directed while your ankle is healing. Do not remove it unless your doctor tells you to. After your ankle has healed, ask your doctor whether you should wear the brace when you exercise. · Put ice or cold packs on your injured ankle for 10 to 20 minutes at a time. Try to do this every 1 to 2 hours for the next 3 days (when you are awake) or until the swelling goes down. Put a thin cloth between the ice and your skin. · You may need to use crutches until you can walk without pain. If you do use crutches, try to bear some weight on your injured ankle if you can do so without pain.  This helps the ankle heal.  · Take pain medicines exactly as directed. ? If the doctor gave you a prescription medicine for pain, take it as prescribed. ? If you are not taking a prescription pain medicine, ask your doctor if you can take an over-the-counter medicine. · If you have been given ankle exercises to do at home, do them exactly as instructed. These can promote healing and help prevent lasting weakness. When should you call for help? Call your doctor now or seek immediate medical care if:    · Your pain is getting worse.     · Your swelling is getting worse.     · Your splint feels too tight or you are unable to loosen it.    Watch closely for changes in your health, and be sure to contact your doctor if:    · You are not getting better after 1 week. Where can you learn more? Go to http://danielle-irais.info/. Enter K028 in the search box to learn more about \"Ankle Sprain: Care Instructions. \"  Current as of: September 20, 2018  Content Version: 11.9  © 4447-4943 [employer]. Care instructions adapted under license by [employer] (which disclaims liability or warranty for this information). If you have questions about a medical condition or this instruction, always ask your healthcare professional. [place] [address] any warranty or liability for your use of this information. Patient Education        Learning About RICE (Rest, Ice, Compression, and Elevation)  What is RICE? RICE is a way to care for an injury. RICE helps relieve pain and swelling. It may also help with healing and flexibility. RICE stands for:  · Rest and protect the injured or sore area. · Ice or a cold pack used as soon as possible. · Compression, or wrapping the injured or sore area with an elastic bandage. · Elevation (propping up) the injured or sore area. How do you do RICE?   You can use RICE for home treatment when you have general aches and pains or after an injury or surgery. Rest  · Do not put weight on the injury for at least 24 to 48 hours. · Use crutches for a badly sprained knee or ankle. · Support a sprained wrist, elbow, or shoulder with a sling. Ice  · Put ice or a cold pack on the injury right away to reduce pain and swelling. Frozen vegetables will also work as an ice pack. Put a thin cloth between the ice or cold pack and your skin. The cloth protects the injured area from getting too cold. · Use ice for 10 to 15 minutes at a time for the first 48 to 72 hours. Compression  · Use compression for sprains, strains, and surgeries of the arms and legs. · Wrap the injured area with an elastic bandage or compression sleeve to reduce swelling. · Don't wrap it too tightly. If the area below it feels numb, tingles, or feels cool, loosen the wrap. Elevation  · Use elevation for areas of the body that can be propped up, such as arms and legs. · Prop up the injured area on pillows whenever you use ice. Keep it propped up anytime you sit or lie down. · Try to keep the injured area at or above the level of your heart. This will help reduce swelling and bruising. Where can you learn more? Go to http://danielle-irais.info/. Enter F131 in the search box to learn more about \"Learning About RICE (Rest, Ice, Compression, and Elevation). \"  Current as of: September 20, 2018  Content Version: 11.9  © 8368-5359 [employer]. Care instructions adapted under license by [employer] (which disclaims liability or warranty for this information). If you have questions about a medical condition or this instruction, always ask your healthcare professional. [place] [address] any warranty or liability for your use of this information.

## 2019-01-26 NOTE — ED NOTES
Emergency Department Nursing Plan of Care The Nursing Plan of Care is developed from the Nursing assessment and Emergency Department Attending provider initial evaluation. The plan of care may be reviewed in the ED Provider note. The Plan of Care was developed with the following considerations:  
Patient / Family readiness to learn indicated by:verbalized understanding Persons(s) to be included in education: patient Barriers to Learning/Limitations:No 
 
Signed Mccoy Burkitt, RN   
1/26/2019   9:14 AM

## 2019-01-26 NOTE — ED NOTES
Patient states she is here today with c/o left foot/ankle pain x 2 weeks. She states she doesn't remember injuring it other the she got up to check her patients sugar and stepped the wrong way.

## 2019-06-12 ENCOUNTER — APPOINTMENT (OUTPATIENT)
Dept: CT IMAGING | Age: 29
DRG: 134 | End: 2019-06-12
Attending: PHYSICIAN ASSISTANT
Payer: COMMERCIAL

## 2019-06-12 ENCOUNTER — HOSPITAL ENCOUNTER (INPATIENT)
Age: 29
LOS: 2 days | Discharge: HOME | DRG: 134 | End: 2019-06-14
Attending: EMERGENCY MEDICINE | Admitting: HOSPITALIST
Payer: COMMERCIAL

## 2019-06-12 ENCOUNTER — APPOINTMENT (OUTPATIENT)
Dept: VASCULAR SURGERY | Age: 29
DRG: 134 | End: 2019-06-12
Attending: EMERGENCY MEDICINE
Payer: COMMERCIAL

## 2019-06-12 DIAGNOSIS — I26.99 ACUTE PULMONARY EMBOLISM WITHOUT ACUTE COR PULMONALE, UNSPECIFIED PULMONARY EMBOLISM TYPE (HCC): Primary | ICD-10-CM

## 2019-06-12 DIAGNOSIS — I82.432 ACUTE DEEP VEIN THROMBOSIS (DVT) OF POPLITEAL VEIN OF LEFT LOWER EXTREMITY (HCC): ICD-10-CM

## 2019-06-12 LAB
ANION GAP SERPL CALC-SCNC: 12 MMOL/L (ref 5–15)
BASOPHILS # BLD: 0 K/UL (ref 0–0.1)
BASOPHILS # BLD: 0 K/UL (ref 0–0.1)
BASOPHILS NFR BLD: 0 % (ref 0–1)
BASOPHILS NFR BLD: 0 % (ref 0–1)
BUN SERPL-MCNC: 10 MG/DL (ref 6–20)
BUN/CREAT SERPL: 15 (ref 12–20)
CALCIUM SERPL-MCNC: 8.8 MG/DL (ref 8.5–10.1)
CHLORIDE SERPL-SCNC: 103 MMOL/L (ref 97–108)
CO2 SERPL-SCNC: 23 MMOL/L (ref 21–32)
CREAT SERPL-MCNC: 0.65 MG/DL (ref 0.55–1.02)
DIFFERENTIAL METHOD BLD: ABNORMAL
DIFFERENTIAL METHOD BLD: ABNORMAL
EOSINOPHIL # BLD: 0.2 K/UL (ref 0–0.4)
EOSINOPHIL # BLD: 0.3 K/UL (ref 0–0.4)
EOSINOPHIL NFR BLD: 1 % (ref 0–7)
EOSINOPHIL NFR BLD: 2 % (ref 0–7)
ERYTHROCYTE [DISTWIDTH] IN BLOOD BY AUTOMATED COUNT: 16.3 % (ref 11.5–14.5)
ERYTHROCYTE [DISTWIDTH] IN BLOOD BY AUTOMATED COUNT: 16.4 % (ref 11.5–14.5)
GLUCOSE SERPL-MCNC: 103 MG/DL (ref 65–100)
HCT VFR BLD AUTO: 31.7 % (ref 35–47)
HCT VFR BLD AUTO: 32.9 % (ref 35–47)
HGB BLD-MCNC: 10.3 G/DL (ref 11.5–16)
HGB BLD-MCNC: 9.9 G/DL (ref 11.5–16)
IMM GRANULOCYTES # BLD AUTO: 0.1 K/UL (ref 0–0.04)
IMM GRANULOCYTES # BLD AUTO: 0.1 K/UL (ref 0–0.04)
IMM GRANULOCYTES NFR BLD AUTO: 0 % (ref 0–0.5)
IMM GRANULOCYTES NFR BLD AUTO: 1 % (ref 0–0.5)
LYMPHOCYTES # BLD: 3.4 K/UL (ref 0.8–3.5)
LYMPHOCYTES # BLD: 3.7 K/UL (ref 0.8–3.5)
LYMPHOCYTES NFR BLD: 20 % (ref 12–49)
LYMPHOCYTES NFR BLD: 23 % (ref 12–49)
MCH RBC QN AUTO: 25.3 PG (ref 26–34)
MCH RBC QN AUTO: 25.4 PG (ref 26–34)
MCHC RBC AUTO-ENTMCNC: 31.2 G/DL (ref 30–36.5)
MCHC RBC AUTO-ENTMCNC: 31.3 G/DL (ref 30–36.5)
MCV RBC AUTO: 80.9 FL (ref 80–99)
MCV RBC AUTO: 81.2 FL (ref 80–99)
MONOCYTES # BLD: 0.9 K/UL (ref 0–1)
MONOCYTES # BLD: 0.9 K/UL (ref 0–1)
MONOCYTES NFR BLD: 5 % (ref 5–13)
MONOCYTES NFR BLD: 5 % (ref 5–13)
NEUTS SEG # BLD: 11.2 K/UL (ref 1.8–8)
NEUTS SEG # BLD: 12.2 K/UL (ref 1.8–8)
NEUTS SEG NFR BLD: 71 % (ref 32–75)
NEUTS SEG NFR BLD: 72 % (ref 32–75)
NRBC # BLD: 0 K/UL (ref 0–0.01)
NRBC # BLD: 0 K/UL (ref 0–0.01)
NRBC BLD-RTO: 0 PER 100 WBC
NRBC BLD-RTO: 0 PER 100 WBC
PLATELET # BLD AUTO: 224 K/UL (ref 150–400)
PLATELET # BLD AUTO: 235 K/UL (ref 150–400)
PMV BLD AUTO: 10.5 FL (ref 8.9–12.9)
PMV BLD AUTO: 10.7 FL (ref 8.9–12.9)
POTASSIUM SERPL-SCNC: 3.6 MMOL/L (ref 3.5–5.1)
RBC # BLD AUTO: 3.92 M/UL (ref 3.8–5.2)
RBC # BLD AUTO: 4.05 M/UL (ref 3.8–5.2)
SODIUM SERPL-SCNC: 138 MMOL/L (ref 136–145)
WBC # BLD AUTO: 16.2 K/UL (ref 3.6–11)
WBC # BLD AUTO: 16.9 K/UL (ref 3.6–11)

## 2019-06-12 PROCEDURE — 65270000029 HC RM PRIVATE

## 2019-06-12 PROCEDURE — 74011636320 HC RX REV CODE- 636/320: Performed by: EMERGENCY MEDICINE

## 2019-06-12 PROCEDURE — 74011250636 HC RX REV CODE- 250/636: Performed by: PHYSICIAN ASSISTANT

## 2019-06-12 PROCEDURE — 96374 THER/PROPH/DIAG INJ IV PUSH: CPT

## 2019-06-12 PROCEDURE — 71275 CT ANGIOGRAPHY CHEST: CPT

## 2019-06-12 PROCEDURE — 80048 BASIC METABOLIC PNL TOTAL CA: CPT

## 2019-06-12 PROCEDURE — 93005 ELECTROCARDIOGRAM TRACING: CPT

## 2019-06-12 PROCEDURE — 93971 EXTREMITY STUDY: CPT

## 2019-06-12 PROCEDURE — 85730 THROMBOPLASTIN TIME PARTIAL: CPT

## 2019-06-12 PROCEDURE — 74011250637 HC RX REV CODE- 250/637: Performed by: PHYSICIAN ASSISTANT

## 2019-06-12 PROCEDURE — 85025 COMPLETE CBC W/AUTO DIFF WBC: CPT

## 2019-06-12 PROCEDURE — 99285 EMERGENCY DEPT VISIT HI MDM: CPT

## 2019-06-12 PROCEDURE — 36415 COLL VENOUS BLD VENIPUNCTURE: CPT

## 2019-06-12 RX ORDER — NAPROXEN 250 MG/1
500 TABLET ORAL
Status: COMPLETED | OUTPATIENT
Start: 2019-06-12 | End: 2019-06-12

## 2019-06-12 RX ORDER — SODIUM CHLORIDE 0.9 % (FLUSH) 0.9 %
SYRINGE (ML) INJECTION
Status: DISPENSED
Start: 2019-06-12 | End: 2019-06-13

## 2019-06-12 RX ORDER — SODIUM CHLORIDE 0.9 % (FLUSH) 0.9 %
5-40 SYRINGE (ML) INJECTION AS NEEDED
Status: DISCONTINUED | OUTPATIENT
Start: 2019-06-12 | End: 2019-06-14 | Stop reason: HOSPADM

## 2019-06-12 RX ORDER — FAMOTIDINE 20 MG/1
20 TABLET, FILM COATED ORAL 2 TIMES DAILY
Status: DISCONTINUED | OUTPATIENT
Start: 2019-06-13 | End: 2019-06-14 | Stop reason: HOSPADM

## 2019-06-12 RX ORDER — HEPARIN SODIUM 10000 [USP'U]/100ML
100 INJECTION, SOLUTION INTRAVENOUS
Status: DISCONTINUED | OUTPATIENT
Start: 2019-06-12 | End: 2019-06-12

## 2019-06-12 RX ORDER — HEPARIN SODIUM 5000 [USP'U]/ML
5000 INJECTION, SOLUTION INTRAVENOUS; SUBCUTANEOUS
Status: COMPLETED | OUTPATIENT
Start: 2019-06-12 | End: 2019-06-12

## 2019-06-12 RX ORDER — SODIUM CHLORIDE 0.9 % (FLUSH) 0.9 %
5-40 SYRINGE (ML) INJECTION EVERY 8 HOURS
Status: DISCONTINUED | OUTPATIENT
Start: 2019-06-12 | End: 2019-06-14 | Stop reason: HOSPADM

## 2019-06-12 RX ORDER — SODIUM CHLORIDE 0.9 % (FLUSH) 0.9 %
10 SYRINGE (ML) INJECTION
Status: ACTIVE | OUTPATIENT
Start: 2019-06-12 | End: 2019-06-13

## 2019-06-12 RX ORDER — HEPARIN SODIUM 5000 [USP'U]/ML
80 INJECTION, SOLUTION INTRAVENOUS; SUBCUTANEOUS ONCE
Status: DISCONTINUED | OUTPATIENT
Start: 2019-06-12 | End: 2019-06-12

## 2019-06-12 RX ORDER — HEPARIN SODIUM 10000 [USP'U]/100ML
13-36 INJECTION, SOLUTION INTRAVENOUS
Status: DISCONTINUED | OUTPATIENT
Start: 2019-06-12 | End: 2019-06-13

## 2019-06-12 RX ADMIN — HEPARIN SODIUM 5000 UNITS: 5000 INJECTION INTRAVENOUS; SUBCUTANEOUS at 22:49

## 2019-06-12 RX ADMIN — Medication 10 ML: at 23:19

## 2019-06-12 RX ADMIN — IOPAMIDOL 100 ML: 755 INJECTION, SOLUTION INTRAVENOUS at 21:35

## 2019-06-12 RX ADMIN — NAPROXEN 500 MG: 250 TABLET ORAL at 18:59

## 2019-06-12 NOTE — PROGRESS NOTES
Left lower extremity venous duplex completed. Appears positive for partial DVT in popliteal and peroneal vein. Full report to follow.

## 2019-06-12 NOTE — ED PROVIDER NOTES
EMERGENCY DEPARTMENT HISTORY AND PHYSICAL EXAM      Date: 6/12/2019  Patient Name: Jessika Sarah    History of Presenting Illness     Chief Complaint   Patient presents with    Leg Pain       History Provided By: Patient    HPI: Jessika Sarah, 25 y.o. female with PMHx significant for htn, MS, CIN1 cervical, ovarian presents maublatory to the ED with cc of left leg swelling and pain x8 days. Patient reports working on her feet daily. Denies trauma/injury. Rates pain 8 out of 10. Describes pain as a constant aching made with worse movement. Denies previous history of blood clot. Patient reports using NuvaRing for birth control. Denies recent chemotherapy, recent surgery, recent long travel. Denies erythema or warmth to leg. Pt has not taken anything for sx. There are no other complaints, changes, or physical findings at this time. PCP: None    No current facility-administered medications on file prior to encounter. Current Outpatient Medications on File Prior to Encounter   Medication Sig Dispense Refill    NUVARING 0.12-0.015 mg/24 hr vaginal ring INSERT 1 RING VAGINALLY AS DIRECTED. REMOVE AFTER 3 WEEKS & WAIT 7 DAYS BEFORE INSERTING A NEW RING  0    naproxen (NAPROSYN) 500 mg tablet Take 1 Tab by mouth every twelve (12) hours as needed for Pain. 20 Tab 0    acetaminophen (TYLENOL) 325 mg tablet Take 2 Tabs by mouth every four (4) hours as needed for Pain. 30 Tab 0    famotidine (PEPCID) 40 mg tablet Take 1 Tab by mouth nightly. 30 Tab 3    acyclovir (ZOVIRAX) 400 mg tablet TAKE 1 TABLET BY MOUTH TWICE A DAY  4    prenatal vit-iron fumarate-fa (PRENATAL PLUS WITH IRON) 28 mg iron- 800 mcg tab Take 1 Tab by mouth daily.  ferrous sulfate (IRON) 325 mg (65 mg iron) tablet Take  by mouth Daily (before breakfast).  multivitamin (ONE A DAY) tablet Take 1 Tab by mouth daily.          Past History     Past Medical History:  Past Medical History:   Diagnosis Date    Cancer ([facility] ovarian     Hypertension     with pregnancy    Ill-defined condition     MS    Ill-defined condition     TIMOTHY 1 with last pap       Past Surgical History:  History reviewed. No pertinent surgical history. Family History:  Family History   Problem Relation Age of Onset    Kidney Disease Mother     Cancer Father        Social History:  Social History     Tobacco Use    Smoking status: Former Smoker    Smokeless tobacco: Never Used   Substance Use Topics    Alcohol use: Yes     Comment: occ    Drug use: No       Allergies: Allergies   Allergen Reactions    Shellfish Derived Swelling         Review of Systems   Review of Systems   Constitutional: Negative for chills and fever. Respiratory: Negative for shortness of breath. Cardiovascular: Negative for chest pain. Gastrointestinal: Negative for abdominal pain, nausea and vomiting. Genitourinary: Negative for flank pain. Musculoskeletal: Negative for back pain and myalgias. Left calf pain and swelling   Skin: Negative for color change, pallor, rash and wound. Neurological: Negative for dizziness, weakness and light-headedness. All other systems reviewed and are negative. Physical Exam   Physical Exam   Constitutional: She is oriented to person, place, and time. She appears well-developed and well-nourished. No distress. HENT:   Head: Normocephalic and atraumatic. Eyes: Conjunctivae are normal.   Cardiovascular: Normal rate, regular rhythm and normal heart sounds. No murmur   Pulmonary/Chest: Effort normal and breath sounds normal. No respiratory distress. Lungs CTA   Abdominal: Soft. Bowel sounds are normal. She exhibits no distension. Musculoskeletal:   Left calf: Swelling to calf. No erythema, warmth, ecchymosis. Skin warm and pink. No edema. DP pulses strong and equal b/l    (+) Rikki sign. Neurological: She is alert and oriented to person, place, and time. Skin: Skin is warm. No rash noted.    Psychiatric: She has a normal mood and affect. Her behavior is normal.   Nursing note and vitals reviewed. Diagnostic Study Results     Labs -     Recent Results (from the past 12 hour(s))   CBC WITH AUTOMATED DIFF    Collection Time: 06/12/19  7:50 PM   Result Value Ref Range    WBC 16.9 (H) 3.6 - 11.0 K/uL    RBC 4.05 3.80 - 5.20 M/uL    HGB 10.3 (L) 11.5 - 16.0 g/dL    HCT 32.9 (L) 35.0 - 47.0 %    MCV 81.2 80.0 - 99.0 FL    MCH 25.4 (L) 26.0 - 34.0 PG    MCHC 31.3 30.0 - 36.5 g/dL    RDW 16.3 (H) 11.5 - 14.5 %    PLATELET 864 403 - 464 K/uL    MPV 10.7 8.9 - 12.9 FL    NRBC 0.0 0  WBC    ABSOLUTE NRBC 0.00 0.00 - 0.01 K/uL    NEUTROPHILS 72 32 - 75 %    LYMPHOCYTES 20 12 - 49 %    MONOCYTES 5 5 - 13 %    EOSINOPHILS 2 0 - 7 %    BASOPHILS 0 0 - 1 %    IMMATURE GRANULOCYTES 1 (H) 0.0 - 0.5 %    ABS. NEUTROPHILS 12.2 (H) 1.8 - 8.0 K/UL    ABS. LYMPHOCYTES 3.4 0.8 - 3.5 K/UL    ABS. MONOCYTES 0.9 0.0 - 1.0 K/UL    ABS. EOSINOPHILS 0.3 0.0 - 0.4 K/UL    ABS. BASOPHILS 0.0 0.0 - 0.1 K/UL    ABS. IMM.  GRANS. 0.1 (H) 0.00 - 0.04 K/UL    DF AUTOMATED     METABOLIC PANEL, BASIC    Collection Time: 06/12/19  7:50 PM   Result Value Ref Range    Sodium 138 136 - 145 mmol/L    Potassium 3.6 3.5 - 5.1 mmol/L    Chloride 103 97 - 108 mmol/L    CO2 23 21 - 32 mmol/L    Anion gap 12 5 - 15 mmol/L    Glucose 103 (H) 65 - 100 mg/dL    BUN 10 6 - 20 MG/DL    Creatinine 0.65 0.55 - 1.02 MG/DL    BUN/Creatinine ratio 15 12 - 20      GFR est AA >60 >60 ml/min/1.73m2    GFR est non-AA >60 >60 ml/min/1.73m2    Calcium 8.8 8.5 - 10.1 MG/DL   EKG, 12 LEAD, INITIAL    Collection Time: 06/12/19 10:16 PM   Result Value Ref Range    Ventricular Rate 89 BPM    Atrial Rate 89 BPM    P-R Interval 158 ms    QRS Duration 76 ms    Q-T Interval 382 ms    QTC Calculation (Bezet) 464 ms    Calculated P Axis 64 degrees    Calculated R Axis 21 degrees    Calculated T Axis -6 degrees    Diagnosis       Normal sinus rhythm  Normal ECG  When compared with ECG of 21-MAY-2018 09:20,  No significant change was found  Confirmed by Kimmy Casas MD, Jerry Lopez ([id number]) on 6/13/2019 12:53:31 AM     PTT    Collection Time: 06/12/19 10:59 PM   Result Value Ref Range    aPTT 86.4 (HH) 22.1 - 32.0 sec    aPTT, therapeutic range     58.0 - 77.0 SECS   CBC WITH AUTOMATED DIFF    Collection Time: 06/12/19 10:59 PM   Result Value Ref Range    WBC 16.2 (H) 3.6 - 11.0 K/uL    RBC 3.92 3.80 - 5.20 M/uL    HGB 9.9 (L) 11.5 - 16.0 g/dL    HCT 31.7 (L) 35.0 - 47.0 %    MCV 80.9 80.0 - 99.0 FL    MCH 25.3 (L) 26.0 - 34.0 PG    MCHC 31.2 30.0 - 36.5 g/dL    RDW 16.4 (H) 11.5 - 14.5 %    PLATELET 839 221 - 503 K/uL    MPV 10.5 8.9 - 12.9 FL    NRBC 0.0 0  WBC    ABSOLUTE NRBC 0.00 0.00 - 0.01 K/uL    NEUTROPHILS 71 32 - 75 %    LYMPHOCYTES 23 12 - 49 %    MONOCYTES 5 5 - 13 %    EOSINOPHILS 1 0 - 7 %    BASOPHILS 0 0 - 1 %    IMMATURE GRANULOCYTES 0 0.0 - 0.5 %    ABS. NEUTROPHILS 11.2 (H) 1.8 - 8.0 K/UL    ABS. LYMPHOCYTES 3.7 (H) 0.8 - 3.5 K/UL    ABS. MONOCYTES 0.9 0.0 - 1.0 K/UL    ABS. EOSINOPHILS 0.2 0.0 - 0.4 K/UL    ABS. BASOPHILS 0.0 0.0 - 0.1 K/UL    ABS. IMM. GRANS. 0.1 (H) 0.00 - 0.04 K/UL    DF AUTOMATED         Radiologic Studies -   CTA CHEST W OR W WO CONT   Final Result   IMPRESSION:   Acute pulmonary bilateral emboli. The findings were called to physician assistant Jamila Lin on 6/12/2019 at 2140 by   myself. [id number]        CT Results  (Last 48 hours)               06/12/19 2135  CTA CHEST W OR W WO CONT Final result    Impression:  IMPRESSION:   Acute pulmonary bilateral emboli. The findings were called to physician assistant Jamila Lin on 6/12/2019 at 2140 by   myself.        [id number]       Narrative:  INDICATION:  Chest pain, acute, pulmonary embolism (PE) suspected; SBO, DVT        EXAM:  CTA Chest with contrast for Pulmonary Embolus       COMPARISON: May 21, 2018       TECHNIQUE:  Following the uneventful intravenous administration of IV contrast   thin helical axial images were obtained through the chest. 3D image   postprocessing was performed. CT dose reduction was achieved through use of a   standardized protocol tailored for this examination and automatic exposure   control for dose modulation. FINDINGS:       THYROID: Unremarkable. MEDIASTINUM/KIMBER: Mild anterior mediastinal thymic tissue. No lymphadenopathy. HEART/PERICARDIUM: Unremarkable. PULMONARY ARTERIES:There are segmental and subsegmental emboli in the posterior   and lateral left lower lobe branches. There is a larger embolus in the right   main pulmonary artery, extending into the lobar and segmental branches of the   right lower lobe, right upper lobe. AORTA:  No aneurysm. LUNGS/PLEURA: No pulmonary edema, pleural effusion or focal airspace disease. INCIDENTALLY IMAGED UPPER ABDOMEN: No significant abnormality. BONES: S-shaped curvature of the thoracolumbar spine. ADDITIONAL COMMENTS:  N/A               CXR Results  (Last 48 hours)    None            Medical Decision Making   I am the first provider for this patient. I reviewed the vital signs, available nursing notes, past medical history, past surgical history, family history and social history. Vital Signs-Reviewed the patient's vital signs. Patient Vitals for the past 12 hrs:   Temp Pulse Resp BP SpO2   06/12/19 2315  95 25 135/90 100 %   06/12/19 2300  99 17 139/87 97 %   06/12/19 2250  95 22 133/87    06/12/19 2100    (!) 134/92 99 %   06/12/19 2030    138/87 97 %   06/12/19 2001    128/75 97 %   06/12/19 2000     98 %   06/12/19 1937     99 %   06/12/19 1936    132/77    06/12/19 1805  (!) 104 18 (!) 139/105 99 %   06/12/19 1636 98.2 °F (36.8 °C) (!) 114 18 (!) 139/91 99 %         Records Reviewed: Nursing Notes and Old Medical Records    Provider Notes (Medical Decision Making):   DDx: DVT, Cellulitis, Calf strain, PE    ED Course:   Initial assessment performed.  The patients presenting problems have been discussed, and they are in agreement with the care plan formulated and outlined with them. I have encouraged them to ask questions as they arise throughout their visit. 7:44 PM  Discussed duplex result with pt. Pt admits that recently she has noticed she becomes more winded when walking. Denies currently CP or SOB. Due to tachycardia and possible SOB, CTA ordered for possible PE.     9:42 PM  Spoke with radiologist, Dr. Sarah Marcos, who informed of b/l pulmonary emboli. 10:05 PM  Spoke with Dr. Addi Chen, consult for hospitalist. Discussed pt's CT scan, lab results and vital signs. He recommended pulmonary consult before admission to ensure that pt is best fit for [facility]. He recommended EKG to check for right heart strain. 10:37 PM  Spoke with Dr. Merlyn Barba, consult for pulmonology. Discussed pt's CT results, lab results, and vital signs. He stated that since pt is hemodynamically stable and not in significant pain, she can currently be managed with IR. He stated that is she decompensate she may need to be transferred. 10:45 PM  Spoke with pt about staying at [facility] vs transfer. Discussed risks of staying at [facility] including possible transfer and decompensation. Pt states she would rather stay close to home, and she understands and accepts risks. 11:14 PM  Spoke with Dr. Addi Chen, discussed pulmonology recommendations and discussion with pt. He agreed to admit pt to inpatient hospitalist services. Patient removed nuva-ring before heparin started. Disposition:  Pt admitted to [facility]. PLAN:  1. Current Discharge Medication List        2. Follow-up Information    None       Return to ED if worse     Diagnosis     Clinical Impression:   1. Acute pulmonary embolism without acute cor pulmonale, unspecified pulmonary embolism type ([address]    2.  Acute deep vein thrombosis (DVT) of popliteal vein of left lower extremity (HCC)

## 2019-06-12 NOTE — ED NOTES

## 2019-06-12 NOTE — ED NOTES
Pt updated on plan of care-still waiting on duplex. Pt also requested pain medication-provider notified. No si/s of acute distress. Call rios within reach. Cathryn Wan, charge rn, notified re: duplex ETA (not here yet).

## 2019-06-13 ENCOUNTER — APPOINTMENT (OUTPATIENT)
Dept: NON INVASIVE DIAGNOSTICS | Age: 29
DRG: 134 | End: 2019-06-13
Attending: HOSPITALIST
Payer: COMMERCIAL

## 2019-06-13 LAB
APTT PPP: 27.1 SEC (ref 22.1–32)
APTT PPP: 37.2 SEC (ref 22.1–32)
APTT PPP: 68.6 SEC (ref 22.1–32)
APTT PPP: 86.4 SEC (ref 22.1–32)
ATRIAL RATE: 89 BPM
CALCULATED P AXIS, ECG09: 64 DEGREES
CALCULATED R AXIS, ECG10: 21 DEGREES
CALCULATED T AXIS, ECG11: -6 DEGREES
DIAGNOSIS, 93000: NORMAL
P-R INTERVAL, ECG05: 158 MS
Q-T INTERVAL, ECG07: 382 MS
QRS DURATION, ECG06: 76 MS
QTC CALCULATION (BEZET), ECG08: 464 MS
THERAPEUTIC RANGE,PTTT: ABNORMAL SECS (ref 58–77)
THERAPEUTIC RANGE,PTTT: NORMAL SECS (ref 58–77)
VENTRICULAR RATE, ECG03: 89 BPM

## 2019-06-13 PROCEDURE — 85730 THROMBOPLASTIN TIME PARTIAL: CPT

## 2019-06-13 PROCEDURE — 36415 COLL VENOUS BLD VENIPUNCTURE: CPT

## 2019-06-13 PROCEDURE — 94760 N-INVAS EAR/PLS OXIMETRY 1: CPT

## 2019-06-13 PROCEDURE — 74011250636 HC RX REV CODE- 250/636: Performed by: EMERGENCY MEDICINE

## 2019-06-13 PROCEDURE — 65660000001 HC RM ICU INTERMED STEPDOWN

## 2019-06-13 PROCEDURE — 74011250637 HC RX REV CODE- 250/637: Performed by: INTERNAL MEDICINE

## 2019-06-13 PROCEDURE — 74011250637 HC RX REV CODE- 250/637: Performed by: HOSPITALIST

## 2019-06-13 PROCEDURE — 93306 TTE W/DOPPLER COMPLETE: CPT

## 2019-06-13 RX ORDER — HEPARIN SODIUM 5000 [USP'U]/ML
40 INJECTION, SOLUTION INTRAVENOUS; SUBCUTANEOUS AS NEEDED
Status: DISCONTINUED | OUTPATIENT
Start: 2019-06-14 | End: 2019-06-13

## 2019-06-13 RX ORDER — HEPARIN SODIUM 5000 [USP'U]/ML
80 INJECTION, SOLUTION INTRAVENOUS; SUBCUTANEOUS AS NEEDED
Status: DISCONTINUED | OUTPATIENT
Start: 2019-06-13 | End: 2019-06-13

## 2019-06-13 RX ORDER — HEPARIN SODIUM 5000 [USP'U]/ML
40 INJECTION, SOLUTION INTRAVENOUS; SUBCUTANEOUS AS NEEDED
Status: DISCONTINUED | OUTPATIENT
Start: 2019-06-13 | End: 2019-06-13

## 2019-06-13 RX ORDER — ETONOGESTREL AND ETHINYL ESTRADIOL 11.7; 2.7 MG/1; MG/1
1 INSERT, EXTENDED RELEASE VAGINAL
COMMUNITY
End: 2019-06-14

## 2019-06-13 RX ORDER — HEPARIN SODIUM 5000 [USP'U]/ML
80 INJECTION, SOLUTION INTRAVENOUS; SUBCUTANEOUS AS NEEDED
Status: DISCONTINUED | OUTPATIENT
Start: 2019-06-14 | End: 2019-06-13

## 2019-06-13 RX ADMIN — FAMOTIDINE 20 MG: 20 TABLET ORAL at 19:48

## 2019-06-13 RX ADMIN — Medication 10 ML: at 18:00

## 2019-06-13 RX ADMIN — RIVAROXABAN 15 MG: 15 TABLET, FILM COATED ORAL at 19:48

## 2019-06-13 RX ADMIN — FAMOTIDINE 20 MG: 20 TABLET ORAL at 10:44

## 2019-06-13 RX ADMIN — HEPARIN SODIUM 8950 UNITS: 5000 INJECTION, SOLUTION INTRAVENOUS; SUBCUTANEOUS at 10:42

## 2019-06-13 RX ADMIN — HEPARIN SODIUM AND DEXTROSE 13 UNITS/KG/HR: 10000; 5 INJECTION INTRAVENOUS at 02:42

## 2019-06-13 NOTE — PROGRESS NOTES
Pharmacy Medication Reconciliation     Recommendations/Findings:   Patient stated she was only taking famotidine and using a Nuvaring PTA  Removed the following medications:  APAP  Acyclovir  Ferrous sulfate  Multivitamin  Naproxen  Prenatal vitamin      -Clarified PTA med list with patient and Rx Query. PTA medication list was corrected to the following:     Prior to Admission Medications   Prescriptions Last Dose Informant Patient Reported? Taking?   ethinyl estradiol-etonogestrel (NUVARING) 0.12-0.015 mg/24 hr vaginal ring   Yes Yes   Si Each by Intravaginal route every twenty-one (21) days. Remove after 3 weeks and wait 7 days before inserting a new ring. famotidine (PEPCID) 40 mg tablet 2019 at Unknown time  No Yes   Sig: Take 1 Tab by mouth nightly. Facility-Administered Medications: None        Thank you,  Johann Velazquez, Pharm. D.

## 2019-06-13 NOTE — PROGRESS NOTES
Bedside and Verbal shift change report given to Paris Burt (oncoming nurse) by Angelica Mukherjee (offgoing nurse). Report included the following information SBAR, Kardex, MAR, Accordion, Recent Results and Cardiac Rhythm nsr.      Resting comfortably; NAD heparin drip at 13u/kg/hr

## 2019-06-13 NOTE — ED NOTES
Spoke with pharmacy from [facility], instructed to hold on starting patient's heparin drip, wait one hour, redraw PTT, wait for results and call pharmacy back. Charge nurse and provider were informed.

## 2019-06-13 NOTE — H&P
GENERAL GENERIC H&P/CONSULT    Subjective:  Patient came to measure with left leg edema and pain since last 8 days. She also had mild shortness of breath. She denied any chest pain nausea vomiting diarrhea dysuria headache fever or chills etc.  She never had similar symptoms in the past.She uses contraceptive ring since last 9 years intermittently. Her last ring insertion was before 2 weeks. Past Medical History:   Diagnosis Date    Cancer ([facility]     ovarian     Hypertension     with pregnancy    Ill-defined condition     MS    Ill-defined condition     TIMOTHY 1 with last pap      History reviewed. No pertinent surgical history. Prior to Admission medications    Medication Sig Start Date End Date Taking? Authorizing Provider   NUVARING 0.12-0.015 mg/24 hr vaginal ring INSERT 1 RING VAGINALLY AS DIRECTED. REMOVE AFTER 3 WEEKS & WAIT 7 DAYS BEFORE INSERTING A NEW RING 1/2/19  Yes Ciro Arevalo MD   naproxen (NAPROSYN) 500 mg tablet Take 1 Tab by mouth every twelve (12) hours as needed for Pain. 1/26/19   Laura Lizarraga MD   acetaminophen (TYLENOL) 325 mg tablet Take 2 Tabs by mouth every four (4) hours as needed for Pain. 7/24/17   Divya SHEIKH MD   famotidine (PEPCID) 40 mg tablet Take 1 Tab by mouth nightly. 7/24/17   Divya SHEIKH MD   acyclovir (ZOVIRAX) 400 mg tablet TAKE 1 TABLET BY MOUTH TWICE A DAY 6/7/17   Provider, Historical   prenatal vit-iron fumarate-fa (PRENATAL PLUS WITH IRON) 28 mg iron- 800 mcg tab Take 1 Tab by mouth daily. Provider, Historical   ferrous sulfate (IRON) 325 mg (65 mg iron) tablet Take  by mouth Daily (before breakfast). Ciro Arevalo MD   multivitamin (ONE A DAY) tablet Take 1 Tab by mouth daily.     Ciro Arevalo MD     Allergies   Allergen Reactions    Shellfish Derived Swelling      Social History     Tobacco Use    Smoking status: Former Smoker    Smokeless tobacco: Never Used   Substance Use Topics    Alcohol use: Yes     Comment: occ      Family History Problem Relation Age of Onset    Kidney Disease Mother     Cancer Father       Review of Systems   All other systems reviewed and are negative. Objective:    No intake/output data recorded. No intake/output data recorded. Patient Vitals for the past 8 hrs:   BP Pulse Resp SpO2   06/12/19 2315 135/90 95 25 100 %   06/12/19 2300 139/87 99 17 97 %   06/12/19 2250 133/87 95 22    06/12/19 2100 (!) 134/92   99 %   06/12/19 2030 138/87   97 %   06/12/19 2001 128/75   97 %   06/12/19 2000    98 %   06/12/19 1937    99 %   06/12/19 1936 132/77      06/12/19 1805 (!) 139/105 (!) 104 18 99 %     Physical Exam   Constitutional: She is oriented to person, place, and time. She appears well-developed and well-nourished. HENT:   Head: Normocephalic and atraumatic. Eyes: Conjunctivae and EOM are normal. Pupils are equal, round, and reactive to light. Neck: Normal range of motion. Neck supple. Cardiovascular: Normal rate and regular rhythm. Pulmonary/Chest: Effort normal and breath sounds normal.   Abdominal: Soft. Bowel sounds are normal.   Musculoskeletal: Normal range of motion. She exhibits edema (Left calf edema and tenderness) and tenderness. Neurological: She is alert and oriented to person, place, and time. Skin: Skin is warm and dry. Psychiatric: She has a normal mood and affect.  Her behavior is normal.        Labs:    Recent Results (from the past 24 hour(s))   CBC WITH AUTOMATED DIFF    Collection Time: 06/12/19  7:50 PM   Result Value Ref Range    WBC 16.9 (H) 3.6 - 11.0 K/uL    RBC 4.05 3.80 - 5.20 M/uL    HGB 10.3 (L) 11.5 - 16.0 g/dL    HCT 32.9 (L) 35.0 - 47.0 %    MCV 81.2 80.0 - 99.0 FL    MCH 25.4 (L) 26.0 - 34.0 PG    MCHC 31.3 30.0 - 36.5 g/dL    RDW 16.3 (H) 11.5 - 14.5 %    PLATELET 328 631 - 778 K/uL    MPV 10.7 8.9 - 12.9 FL    NRBC 0.0 0  WBC    ABSOLUTE NRBC 0.00 0.00 - 0.01 K/uL    NEUTROPHILS 72 32 - 75 %    LYMPHOCYTES 20 12 - 49 %    MONOCYTES 5 5 - 13 %    EOSINOPHILS 2 0 - 7 %    BASOPHILS 0 0 - 1 %    IMMATURE GRANULOCYTES 1 (H) 0.0 - 0.5 %    ABS. NEUTROPHILS 12.2 (H) 1.8 - 8.0 K/UL    ABS. LYMPHOCYTES 3.4 0.8 - 3.5 K/UL    ABS. MONOCYTES 0.9 0.0 - 1.0 K/UL    ABS. EOSINOPHILS 0.3 0.0 - 0.4 K/UL    ABS. BASOPHILS 0.0 0.0 - 0.1 K/UL    ABS. IMM.  GRANS. 0.1 (H) 0.00 - 0.04 K/UL    DF AUTOMATED     METABOLIC PANEL, BASIC    Collection Time: 06/12/19  7:50 PM   Result Value Ref Range    Sodium 138 136 - 145 mmol/L    Potassium 3.6 3.5 - 5.1 mmol/L    Chloride 103 97 - 108 mmol/L    CO2 23 21 - 32 mmol/L    Anion gap 12 5 - 15 mmol/L    Glucose 103 (H) 65 - 100 mg/dL    BUN 10 6 - 20 MG/DL    Creatinine 0.65 0.55 - 1.02 MG/DL    BUN/Creatinine ratio 15 12 - 20      GFR est AA >60 >60 ml/min/1.73m2    GFR est non-AA >60 >60 ml/min/1.73m2    Calcium 8.8 8.5 - 10.1 MG/DL   EKG, 12 LEAD, INITIAL    Collection Time: 06/12/19 10:16 PM   Result Value Ref Range    Ventricular Rate 89 BPM    Atrial Rate 89 BPM    P-R Interval 158 ms    QRS Duration 76 ms    Q-T Interval 382 ms    QTC Calculation (Bezet) 464 ms    Calculated P Axis 64 degrees    Calculated R Axis 21 degrees    Calculated T Axis -6 degrees    Diagnosis       Normal sinus rhythm  Normal ECG  When compared with ECG of 21-MAY-2018 09:20,  No significant change was found  Confirmed by Yaneli El MD, [facility] ([id number]) on 6/13/2019 12:53:31 AM     PTT    Collection Time: 06/12/19 10:59 PM   Result Value Ref Range    aPTT 86.4 (HH) 22.1 - 32.0 sec    aPTT, therapeutic range     58.0 - 77.0 SECS   CBC WITH AUTOMATED DIFF    Collection Time: 06/12/19 10:59 PM   Result Value Ref Range    WBC 16.2 (H) 3.6 - 11.0 K/uL    RBC 3.92 3.80 - 5.20 M/uL    HGB 9.9 (L) 11.5 - 16.0 g/dL    HCT 31.7 (L) 35.0 - 47.0 %    MCV 80.9 80.0 - 99.0 FL    MCH 25.3 (L) 26.0 - 34.0 PG    MCHC 31.2 30.0 - 36.5 g/dL    RDW 16.4 (H) 11.5 - 14.5 %    PLATELET 425 492 - 774 K/uL    MPV 10.5 8.9 - 12.9 FL    NRBC 0.0 0  WBC    ABSOLUTE NRBC 0.00 0.00 - 0.01 K/uL    NEUTROPHILS 71 32 - 75 %    LYMPHOCYTES 23 12 - 49 %    MONOCYTES 5 5 - 13 %    EOSINOPHILS 1 0 - 7 %    BASOPHILS 0 0 - 1 %    IMMATURE GRANULOCYTES 0 0.0 - 0.5 %    ABS. NEUTROPHILS 11.2 (H) 1.8 - 8.0 K/UL    ABS. LYMPHOCYTES 3.7 (H) 0.8 - 3.5 K/UL    ABS. MONOCYTES 0.9 0.0 - 1.0 K/UL    ABS. EOSINOPHILS 0.2 0.0 - 0.4 K/UL    ABS. BASOPHILS 0.0 0.0 - 0.1 K/UL    ABS. IMM. GRANS. 0.1 (H) 0.00 - 0.04 K/UL    DF AUTOMATED             Assessment:  1. Acute bilateral pulmonary emboli with right main pulmonary artery emboli extending into the lobar and subsegmental branches. 2. Left lower extremity deep venous thrombosis extending into popliteal and peroneal vein  3. Leukocytosis  4. Anemia normocytic  5. Intermittent use of alcohol and cigarettes    Plan:  - Admit patient in step down unit  Start patient on heparin infusion  Order 2D echocardiogram in the morning  Emergency room had consulted Pulmonology physician who recommended to continue IV heparin for now and if patient's situation worsened then transferred to higher level of care. Currently patient is stable she is on room air.   Oxygen as needed  Check daily input and output    Signed:  Terry Hoffman MD 6/13/2019

## 2019-06-13 NOTE — ED NOTES
Called pharmacy back with ptt result as requested earlier. Spoke with a different pharmacist and was told differently and that drip should already be started. Explained what pharmacy stated prior and was told not to start the drip, wait one hour, redraw and call back with results. This pharmacist states when initiating heparin drip draw baseline, then start w/o regard to baseline ptt, which is initially what nurse was going to do when instructed to hold the drip. Drip being started at this time.

## 2019-06-13 NOTE — ED NOTES
Attempted to call report at this time. Nurse states she does not know who is receiving patient and that she will find out and have her call back.

## 2019-06-13 NOTE — PROGRESS NOTES
Care Management Interventions  PCP Verified by CM: Yes  Mode of Transport at Discharge: Self  Transition of Care Consult (CM Consult): Discharge Planning  Current Support Network: Lives with Spouse(fiance and 3 kids)  Confirm Follow Up Transport: Self  Plan discussed with Pt/Family/Caregiver: Yes  Freedom of Choice Offered: Yes  Discharge Location  Discharge Placement: Home     Reason for Admission: Left leg pain edema. RRAT Score:  4           Plan for utilizing home health:  Not at this time. CM follow-up patient for discharge planning. Likelihood of Readmission: low. As evidence by patient RRAT score. Transition of Care Plan:  Patient does not have a PCP. States last MD follow-up was  In 2017 and GYN for pregnancy. She would like a list of providers and schedule follow-up herself. CM to provide list of provided. Lives with fihakan and 3 children. 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 1623) CM contact [employer]. Patient will be discharge on Xarelto no prior AUTH needed. 1720: CM contact [facility] on [address]. Patient will have zero co-pay for Xarelto.       [address] [facility]  [phone]

## 2019-06-13 NOTE — ED NOTES
Pt left ED via stretcher en route to PCU. Pt in no acute distress and verbalizes understanding pf progression of care.

## 2019-06-13 NOTE — H&P
Hospitalist Admission Note    NAME: Caryle Rom   :  1990   MRN:  [id number]   Room Number: Kimi Santillan  @ [facility]     Date/Time:  2019 8:28 AM    Patient PCP: None  ______________________________________________________________________  Given the patient's current clinical presentation, I have a high level of concern for decompensation if discharged from the emergency department. Complex decision making was performed, which includes reviewing the patient's available past medical records, laboratory results, and x-ray films. My assessment of this patient's clinical condition and my plan of care is as follows. Assessment / Plan:    Acute bilateral pulmonary emboli with right main pulmonary artery emboli extending into the lobar and subsegmental branches. Left lower extremity deep venous thrombosis extending into popliteal and peroneal vein  Leukocytosis  Anemia normocytic  Intermittent use of alcohol and cigarettes     Plan:  Admit patient in step down unit  C/w heparin infusion, CM to check where NOAC`s will be covered by insurance. Will need 6 months of treatment. Emergency room had consulted Pulmonology physician who recommended to continue IV heparin for now and if patient's situation worsened then transferred to higher level of care. Currently patient is stable she is on room air. Vitals are stable. Oxygen as needed. CTA personally reviewed. Patient was counseled extensively on the need to abstain from tobacco, its addictive tendencies, its deleterious effects on the lungs as well as its financial sequelae  STOP NUVARING      obese  Body mass index is 39.71 kg/m². Counseled on Lifestyle modifications, AHA Diet ,weight loss strategies.   Code Status: FULL  Surrogate Decision Maker:    DVT Prophylaxis: HEPARIN GTT  GI Prophylaxis: not indicated    Baseline: works as CNA        Subjective:   CHIEF COMPLAINT: left leg edema    HISTORY OF PRESENT ILLNESS:     Gina Mcpherson is a 25 y.o.  female with no PMH  who presents to ED with c/o above. Patient came to [address] [place] [address] left leg edema and pain since last 8 days. She also had mild shortness of breath. She denied any chest pain nausea vomiting diarrhea dysuria headache fever or chills etc. She never had similar symptoms in the past.She uses contraceptive ring since last 9 years intermittently. Her last ring insertion was before 2 weeks. occasional tobacco smoker. Denies long drives or hx blood clots in family. USG-Acute non-occlusive thrombus present in the left popliteal vein. Acute non-occlusive thrombus present in the left peroneal vein. CTA-Acute pulmonary bilateral emboli. We were asked to admit for work up and evaluation of the above problems. Past Medical History:   Diagnosis Date    Cancer ([address]     ovarian     Hypertension     with pregnancy    Ill-defined condition     MS    Ill-defined condition     TIMOTHY 1 with last pap        History reviewed. No pertinent surgical history. Social History     Tobacco Use    Smoking status: Former Smoker    Smokeless tobacco: Never Used   Substance Use Topics    Alcohol use: Yes     Comment: occ        Family History   Problem Relation Age of Onset    Kidney Disease Mother     Cancer Father      Allergies   Allergen Reactions    Shellfish Derived Swelling        Prior to Admission medications    Medication Sig Start Date End Date Taking? Authorizing Provider   ethinyl estradiol-etonogestrel (NUVARING) 0.12-0.015 mg/24 hr vaginal ring 1 Each by Intravaginal route every twenty-one (21) days. Remove after 3 weeks and wait 7 days before inserting a new ring. Yes Provider, Historical   famotidine (PEPCID) 40 mg tablet Take 1 Tab by mouth nightly. 7/24/17  Yes Lucia Ovalles MD       REVIEW OF SYSTEMS:     I am not able to complete the review of systems because:    The patient is intubated and sedated    The patient has altered mental status due to his acute medical problems    The patient has baseline aphasia from prior stroke(s)    The patient has baseline dementia and is not reliable historian    The patient is in acute medical distress and unable to provide information           Total of 12 systems reviewed as follows:       POSITIVE= underlined text  Negative = text not underlined  General:  fever, chills, sweats, generalized weakness, weight loss/gain,      loss of appetite   Eyes:    blurred vision, eye pain, loss of vision, double vision  ENT:    rhinorrhea, pharyngitis   Respiratory:   cough, sputum production, SOB, RICHTER, wheezing, pleuritic pain   Cardiology:   chest pain, palpitations, orthopnea, PND, edema, syncope   Gastrointestinal:  abdominal pain , N/V, diarrhea, dysphagia, constipation, bleeding   Genitourinary:  frequency, urgency, dysuria, hematuria, incontinence   Muskuloskeletal :  arthralgia, myalgia, back pain,LEFT LEG SWELLING  Hematology:  easy bruising, nose or gum bleeding, lymphadenopathy   Dermatological: rash, ulceration, pruritis, color change / jaundice  Endocrine:   hot flashes or polydipsia   Neurological:  headache, dizziness, confusion, focal weakness, paresthesia,     Speech difficulties, memory loss, gait difficulty  Psychological: Feelings of anxiety, depression, agitation    Objective:   VITALS:    Visit Vitals  /67   Pulse 78   Temp 98.5 °F (36.9 °C)   Resp 25   Ht 5' 6\" (1.676 m)   Wt 111.6 kg (246 lb)   SpO2 99%   Breastfeeding? No   BMI 39.71 kg/m²       PHYSICAL EXAM:    General:    Alert, cooperative, no distress, appears stated age. HEENT: Atraumatic, anicteric sclerae, pink conjunctivae     No oral ulcers, mucosa moist, throat clear, dentition fair  Neck:  Supple, symmetrical,  thyroid: non tender  Lungs:   Clear to auscultation bilaterally. No Wheezing or Rhonchi. No rales. Chest wall:  No tenderness  No Accessory muscle use.   Heart:   Regular  rhythm,  No  murmur   Left leg edema  Abdomen:   Soft, non-tender. Not distended. Bowel sounds normal  Extremities: No cyanosis. No clubbing,      Skin turgor normal, Capillary refill normal, Radial dial pulse 2+  Skin:     Not pale. Not Jaundiced  No rashes   Psych:  Good insight. Not depressed. Not anxious or agitated. Neurologic: EOMs intact. No facial asymmetry. No aphasia or slurred speech. Symmetrical strength, Sensation grossly intact. Alert and oriented X 4.     ______________________________________________________________________    Care Plan discussed with:  Patient/Family and Nurse    Expected  Disposition:  Home w/Family  ________________________________________________________________________  TOTAL TIME:  76 Minutes    Critical Care Provided     Minutes non procedure based      Comments     Reviewed previous records   >50% of visit spent in counseling and coordination of care  Discussion with patient and/or family and questions answered       ________________________________________________________________________  Signed: Miguelito Caban MD    Procedures: see electronic medical records for all procedures/Xrays and details which were not copied into this note but were reviewed prior to creation of Plan. LAB DATA REVIEWED:    Recent Results (from the past 24 hour(s))   CBC WITH AUTOMATED DIFF    Collection Time: 06/12/19  7:50 PM   Result Value Ref Range    WBC 16.9 (H) 3.6 - 11.0 K/uL    RBC 4.05 3.80 - 5.20 M/uL    HGB 10.3 (L) 11.5 - 16.0 g/dL    HCT 32.9 (L) 35.0 - 47.0 %    MCV 81.2 80.0 - 99.0 FL    MCH 25.4 (L) 26.0 - 34.0 PG    MCHC 31.3 30.0 - 36.5 g/dL    RDW 16.3 (H) 11.5 - 14.5 %    PLATELET 348 067 - 967 K/uL    MPV 10.7 8.9 - 12.9 FL    NRBC 0.0 0  WBC    ABSOLUTE NRBC 0.00 0.00 - 0.01 K/uL    NEUTROPHILS 72 32 - 75 %    LYMPHOCYTES 20 12 - 49 %    MONOCYTES 5 5 - 13 %    EOSINOPHILS 2 0 - 7 %    BASOPHILS 0 0 - 1 %    IMMATURE GRANULOCYTES 1 (H) 0.0 - 0.5 %    ABS.  NEUTROPHILS 12.2 (H) 1.8 - 8.0 K/UL ABS. LYMPHOCYTES 3.4 0.8 - 3.5 K/UL    ABS. MONOCYTES 0.9 0.0 - 1.0 K/UL    ABS. EOSINOPHILS 0.3 0.0 - 0.4 K/UL    ABS. BASOPHILS 0.0 0.0 - 0.1 K/UL    ABS. IMM.  GRANS. 0.1 (H) 0.00 - 0.04 K/UL    DF AUTOMATED     METABOLIC PANEL, BASIC    Collection Time: 06/12/19  7:50 PM   Result Value Ref Range    Sodium 138 136 - 145 mmol/L    Potassium 3.6 3.5 - 5.1 mmol/L    Chloride 103 97 - 108 mmol/L    CO2 23 21 - 32 mmol/L    Anion gap 12 5 - 15 mmol/L    Glucose 103 (H) 65 - 100 mg/dL    BUN 10 6 - 20 MG/DL    Creatinine 0.65 0.55 - 1.02 MG/DL    BUN/Creatinine ratio 15 12 - 20      GFR est AA >60 >60 ml/min/1.73m2    GFR est non-AA >60 >60 ml/min/1.73m2    Calcium 8.8 8.5 - 10.1 MG/DL   EKG, 12 LEAD, INITIAL    Collection Time: 06/12/19 10:16 PM   Result Value Ref Range    Ventricular Rate 89 BPM    Atrial Rate 89 BPM    P-R Interval 158 ms    QRS Duration 76 ms    Q-T Interval 382 ms    QTC Calculation (Bezet) 464 ms    Calculated P Axis 64 degrees    Calculated R Axis 21 degrees    Calculated T Axis -6 degrees    Diagnosis       Normal sinus rhythm  Normal ECG  When compared with ECG of 21-MAY-2018 09:20,  No significant change was found  Confirmed by Darlene Vernon MD, [facility] ([id number]) on 6/13/2019 12:53:31 AM     PTT    Collection Time: 06/12/19 10:59 PM   Result Value Ref Range    aPTT 86.4 (HH) 22.1 - 32.0 sec    aPTT, therapeutic range     58.0 - 77.0 SECS   CBC WITH AUTOMATED DIFF    Collection Time: 06/12/19 10:59 PM   Result Value Ref Range    WBC 16.2 (H) 3.6 - 11.0 K/uL    RBC 3.92 3.80 - 5.20 M/uL    HGB 9.9 (L) 11.5 - 16.0 g/dL    HCT 31.7 (L) 35.0 - 47.0 %    MCV 80.9 80.0 - 99.0 FL    MCH 25.3 (L) 26.0 - 34.0 PG    MCHC 31.2 30.0 - 36.5 g/dL    RDW 16.4 (H) 11.5 - 14.5 %    PLATELET 651 191 - 872 K/uL    MPV 10.5 8.9 - 12.9 FL    NRBC 0.0 0  WBC    ABSOLUTE NRBC 0.00 0.00 - 0.01 K/uL    NEUTROPHILS 71 32 - 75 %    LYMPHOCYTES 23 12 - 49 %    MONOCYTES 5 5 - 13 %    EOSINOPHILS 1 0 - 7 %    BASOPHILS 0 0 - 1 %    IMMATURE GRANULOCYTES 0 0.0 - 0.5 %    ABS. NEUTROPHILS 11.2 (H) 1.8 - 8.0 K/UL    ABS. LYMPHOCYTES 3.7 (H) 0.8 - 3.5 K/UL    ABS. MONOCYTES 0.9 0.0 - 1.0 K/UL    ABS. EOSINOPHILS 0.2 0.0 - 0.4 K/UL    ABS. BASOPHILS 0.0 0.0 - 0.1 K/UL    ABS. IMM.  GRANS. 0.1 (H) 0.00 - 0.04 K/UL    DF AUTOMATED     PTT    Collection Time: 06/13/19  1:31 AM   Result Value Ref Range    aPTT 27.1 22.1 - 32.0 sec    aPTT, therapeutic range     58.0 - 77.0 SECS   PTT    Collection Time: 06/13/19  9:01 AM   Result Value Ref Range    aPTT 37.2 (H) 22.1 - 32.0 sec    aPTT, therapeutic range     58.0 - 77.0 SECS   ECHO ADULT COMPLETE    Collection Time: 06/13/19  2:32 PM   Result Value Ref Range    Aortic Valve Area by Planimetry 3.3 cm2    LVIDd 4.37 3.9 - 5.3 cm    LVPWd 0.87 0.6 - 0.9 cm    LVIDs 2.72 cm    IVSd 1.54 (A) 0.6 - 0.9 cm    LV ES Vol A4C 38.1 mL    LVOT d 2.10 cm    LVOT Peak Velocity 85.45 cm/s    LVOT Peak Gradient 2.9 mmHg    Mitral Valve Area by Planimetry 7.3 cm2    MVA (PHT) 7.0 cm2    MV A Fredi 34.87 cm/s    MV E Fredi 36.56 cm/s    MV E/A 1.00     MV Mean Gradient 0.8 mmHg    Mitral Valve Annulus Velocity Time Integral 11.14 cm    Left Atrium to Aortic Root Ratio 1.95     LV Ejection Fraction MOD 4C 77 %    LA Vol 4C 68.20 (A) 22 - 52 mL    LA Area 4C 21.8 cm2    LV Mass .3 (A) 67 - 162 g    LV Mass AL Index 83.4 43 - 95 g/m2    RVSP 28.3 mmHg    MV Peak Gradient 2.1 mmHg    LV ED Vol A4C 164.5 mL    Est. RA Pressure 5.0 mmHg    Mitral Regurgitant Peak Velocity 202.59 cm/s    Mitral Valve E Wave Deceleration Time 90.8 ms    Mitral Valve Pressure Half-time 31.3 ms    Left Atrium Major Axis 3.99 cm    Triscuspid Valve Regurgitation Peak Gradient 23.3 mmHg    Pulmonic Regurgitant End Max Velocity 171.28 cm/s    Mitral Valve Max Velocity 71.77 cm/s    TR Max Velocity 241.41 cm/s    PASP 28.3 mmHg    LA Vol Index 31.23 16 - 28 ml/m2    LVED Vol Index A4C 75.3 mL/m2    LVES Vol Index A4C 17.4 mL/m2    MR Peak Gradient 16.4 mmHg    Ao Root D 2.05 cm

## 2019-06-13 NOTE — ED NOTES
TRANSFER - OUT REPORT:    Verbal report given to DERRICK Rasheed on Caryle Rom  being transferred to [facility]04[facility]02 for routine progression of care       Report consisted of patients Situation, Background, Assessment and   Recommendations(SBAR). Information from the following report(s) SBAR, ED Summary, Procedure Summary, MAR and Recent Results was reviewed with the receiving nurse. Lines:   Peripheral IV 06/12/19 Right Antecubital (Active)   Site Assessment Clean, dry, & intact 6/12/2019  8:03 PM   Phlebitis Assessment 0 6/12/2019  8:03 PM   Infiltration Assessment 0 6/12/2019  8:03 PM   Dressing Status Clean, dry, & intact 6/12/2019  8:03 PM   Dressing Type Transparent 6/12/2019  8:03 PM   Hub Color/Line Status Pink;Flushed;Patent 6/12/2019  8:03 PM   Action Taken Blood drawn 6/12/2019  8:03 PM       Peripheral IV 06/12/19 Left Antecubital (Active)   Site Assessment Clean, dry, & intact 6/12/2019  9:16 PM   Phlebitis Assessment 0 6/12/2019  9:16 PM   Infiltration Assessment 0 6/12/2019  9:16 PM   Dressing Status Clean, dry, & intact 6/12/2019  9:16 PM   Hub Color/Line Status Pink 6/12/2019  9:16 PM   Action Taken Catheter repositioned 6/12/2019  9:16 PM        Opportunity for questions and clarification was provided.       Patient transported with:   Monitor   Personal Belongings  Heparin Drip @ 14.5ml/h

## 2019-06-14 VITALS
BODY MASS INDEX: 39.53 KG/M2 | HEIGHT: 66 IN | WEIGHT: 246 LBS | HEART RATE: 83 BPM | RESPIRATION RATE: 25 BRPM | DIASTOLIC BLOOD PRESSURE: 76 MMHG | TEMPERATURE: 98.9 F | SYSTOLIC BLOOD PRESSURE: 128 MMHG | OXYGEN SATURATION: 97 %

## 2019-06-14 LAB
ANION GAP SERPL CALC-SCNC: 14 MMOL/L (ref 5–15)
BUN SERPL-MCNC: 7 MG/DL (ref 6–20)
BUN/CREAT SERPL: 14 (ref 12–20)
CALCIUM SERPL-MCNC: 8.2 MG/DL (ref 8.5–10.1)
CHLORIDE SERPL-SCNC: 106 MMOL/L (ref 97–108)
CO2 SERPL-SCNC: 21 MMOL/L (ref 21–32)
CREAT SERPL-MCNC: 0.5 MG/DL (ref 0.55–1.02)
ECHO AO ROOT DIAM: 2.05 CM
ECHO AV AREA PLAN: 3.3 CM2
ECHO EST RA PRESSURE: 5 MMHG
ECHO LA AREA 4C: 21.8 CM2
ECHO LA MAJOR AXIS: 3.99 CM
ECHO LA TO AORTIC ROOT RATIO: 1.95
ECHO LA VOL 4C: 68.2 ML (ref 22–52)
ECHO LA VOLUME INDEX A4C: 31.23 ML/M2 (ref 16–28)
ECHO LV EDV A4C: 164.5 ML
ECHO LV EDV INDEX A4C: 75.3 ML/M2
ECHO LV EJECTION FRACTION A4C: 77 %
ECHO LV ESV A4C: 38.1 ML
ECHO LV ESV INDEX A4C: 17.4 ML/M2
ECHO LV INTERNAL DIMENSION DIASTOLIC: 4.37 CM (ref 3.9–5.3)
ECHO LV INTERNAL DIMENSION SYSTOLIC: 2.72 CM
ECHO LV IVSD: 1.54 CM (ref 0.6–0.9)
ECHO LV MASS 2D: 223.3 G (ref 67–162)
ECHO LV MASS INDEX 2D: 83.4 G/M2 (ref 43–95)
ECHO LV POSTERIOR WALL DIASTOLIC: 0.87 CM (ref 0.6–0.9)
ECHO LVOT DIAM: 2.1 CM
ECHO LVOT PEAK GRADIENT: 2.9 MMHG
ECHO LVOT PEAK VELOCITY: 85.45 CM/S
ECHO MV A VELOCITY: 34.87 CM/S
ECHO MV AREA PHT: 7 CM2
ECHO MV AREA PLAN: 7.3 CM2
ECHO MV E DECELERATION TIME (DT): 90.8 MS
ECHO MV E VELOCITY: 36.56 CM/S
ECHO MV E/A RATIO: 1
ECHO MV MAX VELOCITY: 71.77 CM/S
ECHO MV MEAN GRADIENT: 0.8 MMHG
ECHO MV PEAK GRADIENT: 2.1 MMHG
ECHO MV PRESSURE HALF TIME (PHT): 31.3 MS
ECHO MV REGURGITANT PEAK GRADIENT: 16.4 MMHG
ECHO MV REGURGITANT PEAK VELOCITY: 202.59 CM/S
ECHO MV VTI: 11.14 CM
ECHO PULMONARY ARTERY SYSTOLIC PRESSURE (PASP): 28.3 MMHG
ECHO PV REGURGITANT MAX VELOCITY: 171.28 CM/S
ECHO RIGHT VENTRICULAR SYSTOLIC PRESSURE (RVSP): 28.3 MMHG
ECHO TV REGURGITANT MAX VELOCITY: 241.41 CM/S
ECHO TV REGURGITANT PEAK GRADIENT: 23.3 MMHG
ERYTHROCYTE [DISTWIDTH] IN BLOOD BY AUTOMATED COUNT: 16 % (ref 11.5–14.5)
GLUCOSE SERPL-MCNC: 97 MG/DL (ref 65–100)
HCT VFR BLD AUTO: 29.7 % (ref 35–47)
HGB BLD-MCNC: 9.3 G/DL (ref 11.5–16)
MCH RBC QN AUTO: 25.4 PG (ref 26–34)
MCHC RBC AUTO-ENTMCNC: 31.3 G/DL (ref 30–36.5)
MCV RBC AUTO: 81.1 FL (ref 80–99)
NRBC # BLD: 0 K/UL (ref 0–0.01)
NRBC BLD-RTO: 0 PER 100 WBC
PLATELET # BLD AUTO: 217 K/UL (ref 150–400)
PMV BLD AUTO: 10.9 FL (ref 8.9–12.9)
POTASSIUM SERPL-SCNC: 3.5 MMOL/L (ref 3.5–5.1)
RBC # BLD AUTO: 3.66 M/UL (ref 3.8–5.2)
SODIUM SERPL-SCNC: 141 MMOL/L (ref 136–145)
WBC # BLD AUTO: 10.7 K/UL (ref 3.6–11)

## 2019-06-14 PROCEDURE — 94760 N-INVAS EAR/PLS OXIMETRY 1: CPT

## 2019-06-14 PROCEDURE — 85027 COMPLETE CBC AUTOMATED: CPT

## 2019-06-14 PROCEDURE — 74011250637 HC RX REV CODE- 250/637: Performed by: INTERNAL MEDICINE

## 2019-06-14 PROCEDURE — 80048 BASIC METABOLIC PNL TOTAL CA: CPT

## 2019-06-14 PROCEDURE — 36415 COLL VENOUS BLD VENIPUNCTURE: CPT

## 2019-06-14 PROCEDURE — 74011250637 HC RX REV CODE- 250/637: Performed by: HOSPITALIST

## 2019-06-14 RX ORDER — HYDROCODONE BITARTRATE AND ACETAMINOPHEN 5; 325 MG/1; MG/1
1 TABLET ORAL
Qty: 12 TAB | Refills: 0 | Status: SHIPPED | OUTPATIENT
Start: 2019-06-14 | End: 2019-06-19

## 2019-06-14 RX ORDER — ACETAMINOPHEN 325 MG/1
650 TABLET ORAL
Status: DISCONTINUED | OUTPATIENT
Start: 2019-06-14 | End: 2019-06-14 | Stop reason: HOSPADM

## 2019-06-14 RX ADMIN — RIVAROXABAN 15 MG: 15 TABLET, FILM COATED ORAL at 08:47

## 2019-06-14 RX ADMIN — Medication 10 ML: at 00:57

## 2019-06-14 RX ADMIN — ACETAMINOPHEN 650 MG: 325 TABLET, FILM COATED ORAL at 08:53

## 2019-06-14 RX ADMIN — FAMOTIDINE 20 MG: 20 TABLET ORAL at 08:48

## 2019-06-14 RX ADMIN — Medication 10 ML: at 05:28

## 2019-06-14 NOTE — DISCHARGE SUMMARY
Hospitalist Discharge Summary     Patient ID:  Rocael Stoner  [id number]  25 y.o.  1990    PCP on record: None    Admit date: 6/12/2019  Discharge date and time: 6/14/2019      Admission Diagnoses: Pulmonary emboli ([address] [I26.99]  PE (pulmonary thromboembolism) ([address] [I26.99]    Discharge Diagnoses:    Principal Problem:    PE (pulmonary thromboembolism) ([address] (6/12/2019)    Active Problems:    Pulmonary emboli ([address] (6/12/2019)           Hospital Course:   Acute bilateral pulmonary emboli with right main pulmonary artery emboli extending into the lobar and subsegmental branches. Left lower extremity deep venous thrombosis extending into popliteal and peroneal vein  Leukocytosis  Anemia normocytic  Intermittent use of alcohol and cigarettes     Stop  heparin infusion, xarelto will be covered by insurance. Will need 6 months of treatment. Patient was counseled extensively on the need to abstain from tobacco, its addictive tendencies, its deleterious effects on the lungs as well as its financial sequelae  STOP NUVARING        obese  Body mass index is 39.71 kg/m². CONSULTATIONS:  IP CONSULT TO HOSPITALIST    Excerpted HPI from H&P of Fabienne Bloch, MD:  Rocael Stoner is a 25 y.o.  female with no PMH  who presents to ED with c/o above. Patient came to [address] [place] [address] left leg edema and pain since last 8 days. She also had mild shortness of breath. She denied any chest pain nausea vomiting diarrhea dysuria headache fever or chills etc. She never had similar symptoms in the past.She uses contraceptive ring since last 9 years intermittently. Her last ring insertion was before 2 weeks. occasional tobacco smoker. Denies long drives or hx blood clots in family. USG-Acute non-occlusive thrombus present in the left popliteal vein. Acute non-occlusive thrombus present in the left peroneal vein.    CTA-Acute pulmonary bilateral emboli.      ______________________________________________________________________  DISCHARGE SUMMARY/HOSPITAL COURSE:  for full details see H&P, daily progress notes, labs, consult notes. _______________________________________________________________________  Patient seen and examined by me on discharge day. Pertinent Findings:  Gen:    Not in distress  Chest: Clear lungs  CVS:   Regular rhythm. No edema  Abd:  Soft, not distended, not tender  Neuro:  Alert with good insight. Oriented to person, place, and time   _______________________________________________________________________  DISCHARGE MEDICATIONS:   Current Discharge Medication List      START taking these medications    Details   !! rivaroxaban (XARELTO) 15 mg tab tablet Take 1 Tab by mouth two (2) times daily (with meals) for 21 days. Qty: 42 Tab, Refills: 0      !! rivaroxaban (XARELTO) 20 mg tab tablet Take 1 Tab by mouth daily (with breakfast) for 30 days. Qty: 30 Tab, Refills: 4            CONTINUE these medications which have NOT CHANGED    Details   famotidine (PEPCID) 40 mg tablet Take 1 Tab by mouth nightly. Qty: 30 Tab, Refills: 3         STOP taking these medications       ethinyl estradiol-etonogestrel (NUVARING) 0.12-0.015 mg/24 hr vaginal ring Comments:   Reason for Stopping:               My Recommended Diet, Activity, Wound Care, and follow-up labs are listed in the patient's Discharge Insturctions which I have personally completed and reviewed.     _______________________________________________________________________  DISPOSITION:     Home with Family: x   Home with HH/PT/OT/RN:    SNF/LTC:    YADIRA:    OTHER:        Condition at Discharge:  Stable  _______________________________________________________________________  Follow up with:   PCP : None  Follow-up Information     Follow up With Specialties Details Why Contact Info    None    None ([phone]) Patient stated that they have no PCP                Total time in minutes spent coordinating this discharge (includes going over instructions, follow-up, prescriptions, and preparing report for sign off to her PCP) :  30 minutes    Signed:  Linwood Markham MD

## 2019-06-14 NOTE — PROGRESS NOTES
CM went and see patient. CM ask patient due to diagnoses if CM can schedule follow-up appointment for her. Patient states she wants to schedule her own appointment and will follow-up on her own. Family at bedside here to transport patient.     [address] [place] [address]  [phone]

## 2019-06-14 NOTE — PROGRESS NOTES
[address]       6/14/2019      RE: Beatriz Lizarraga      To Whom it May Concern: This is to certify that Beatriz Lizarraga was admitted to hospital on 6/12/2019   to 6/14/19. She may return to work on 6/17/2019. Thank you for your assistance in this matter.     Sincerely,      Harsh Camarillo MD

## 2019-06-14 NOTE — PROGRESS NOTES
Pt discharge home. AVS discharge instruction reviewed with pt.prescription medicine send to pt preferred pharmacy. pt received written instruction regarding her medicine. care notes done. brain sheet reviewed with pt.pt awake alert with steady gait. pt has all her belonging with her. pt wheel down to parking lot. pt left the building.

## 2019-06-14 NOTE — PROGRESS NOTES
Bedside\"} shift change report given to Milady Rojas (oncoming nurse) by   (offgoing nurse).  Report included the following information

## 2019-06-17 ENCOUNTER — TELEPHONE (OUTPATIENT)
Dept: CASE MANAGEMENT | Age: 29
End: 2019-06-17

## 2019-06-17 NOTE — TELEPHONE ENCOUNTER
Case Management Follow-up call  CM reviewed chart. CM called pt to discuss discharge. Pt stated she will be calling to make her appointment and is aware where to find the phone number for the practice. Pt did  her medications from the pharmacy.        Murphy Jaimes, GREGORY  [phone]

## 2019-06-17 NOTE — PROGRESS NOTES
Visit charted 6/17/19. Spiritual Care Partner Volunteer visited patient in PCU at [facility] on 6/13/19.   Documented by:  Becca Farr

## 2019-06-27 ENCOUNTER — OFFICE VISIT (OUTPATIENT)
Dept: INTERNAL MEDICINE CLINIC | Age: 29
End: 2019-06-27

## 2019-06-27 VITALS
TEMPERATURE: 98.3 F | BODY MASS INDEX: 40.02 KG/M2 | SYSTOLIC BLOOD PRESSURE: 110 MMHG | WEIGHT: 249 LBS | DIASTOLIC BLOOD PRESSURE: 64 MMHG | HEART RATE: 110 BPM | RESPIRATION RATE: 19 BRPM | OXYGEN SATURATION: 98 % | HEIGHT: 66 IN

## 2019-06-27 DIAGNOSIS — F41.9 ANXIETY: ICD-10-CM

## 2019-06-27 DIAGNOSIS — I82.492 ACUTE DEEP VEIN THROMBOSIS (DVT) OF OTHER SPECIFIED VEIN OF LEFT LOWER EXTREMITY (HCC): ICD-10-CM

## 2019-06-27 DIAGNOSIS — E66.01 OBESITY, MORBID (HCC): ICD-10-CM

## 2019-06-27 DIAGNOSIS — L74.512 SWEATY PALMS: ICD-10-CM

## 2019-06-27 DIAGNOSIS — D64.9 ANEMIA, UNSPECIFIED TYPE: ICD-10-CM

## 2019-06-27 DIAGNOSIS — M41.9 SCOLIOSIS OF THORACIC SPINE, UNSPECIFIED SCOLIOSIS TYPE: ICD-10-CM

## 2019-06-27 DIAGNOSIS — Z79.01 LONG TERM (CURRENT) USE OF ANTICOAGULANTS: ICD-10-CM

## 2019-06-27 DIAGNOSIS — I26.99 OTHER ACUTE PULMONARY EMBOLISM WITHOUT ACUTE COR PULMONALE (HCC): Primary | ICD-10-CM

## 2019-06-27 DIAGNOSIS — D72.829 LEUKOCYTOSIS, UNSPECIFIED TYPE: ICD-10-CM

## 2019-06-27 NOTE — PROGRESS NOTES
1. Have you been to the ER, urgent care clinic since your last visit? Hospitalized since your last visit? yes    2. Have you seen or consulted any other health care providers outside of the [address] [place] [address] since your last visit? Include any pap smears or colon screening.  No    3 most recent PHQ Screens 6/27/2019   Little interest or pleasure in doing things Not at all   Feeling down, depressed, irritable, or hopeless Not at all   Total Score PHQ 2 0     Chief Complaint   Patient presents with   [address]

## 2019-06-27 NOTE — PATIENT INSTRUCTIONS
Perscription for Obesity · Avoid processed foods. · If you eat out, get the nutrition information from fast food or chain restaurants. · Choose the meals that are less than 400-500 calories. · Exercise: · Walk non stop for 20 - 45 mins 5 days/wk · Prepare your meals & snacks in advance if you want to stick to eating healthy for the long term. · Take time to eat your meal Slowly over 30 mins · Take time & savor each bite · Put fork down with each bite · Chew slowly · Avoid other activities except talking to your meal companions. · Drink one full glass of water Before you start your meal. 
 
· Salad? · Get dressing on the side · Dip fork into dressing before each forkful of salad, unless the oil in the dressing is extra virgin olive oil (EVOO, you can pour the dressing onto the salad). · Eat Your Veggies First! 
 
? Follow \"Plate\" diet: 
-half plate should be veggies 
- leafy greens 
- broccoli, peppers, cauliflower, cabbage, string beans, asparagus, squash. .. 
- a little bit of fruit 
- one fourth plate should be protein (fish, chicken, meat, tofu, Greek yogurt. ..) - one fourth plate limit for starch 
- beans without added sugar are great here) OR 
- sweet potato without added sugar is great here OR 
  - starchy veggies (lima beans, corn) OR 
  - rice / potato OR 
  - bread or biscuit - No Seconds! 
 - if you can't avoid seconds, follow the above for second plate Learning About How to Prevent Blood Clots What is a blood clot? A blood clot is a clump of blood that forms in a blood vessel, such as a vein or an artery. If a clot gets stuck in a blood vessel, it can cause serious problems like a deep vein thrombosis (DVT) or a pulmonary embolism. A DVT is a blood clot in certain veins of the legs, pelvis, or arms. It most often occurs in the legs.  Blood clots in these veins need to be treated, because they can get bigger, break loose, and travel through the bloodstream to the heart and then to the lungs. This causes a pulmonary embolism. A pulmonary embolism is a sudden blockage of an artery in the lung. Blood clots in the deep veins of the leg are the most common cause of a pulmonary embolism. In many cases, the clots are small. They may damage the lung. But if the clot is large and stops blood flow to the lung, it can be deadly. What increases your risk for blood clots? Some of the things that can increase your risk for a blood clot include: 
Slowed blood flow When blood doesn't flow normally, clots are more likely to develop. Reduced blood flow may result from long-term bed rest, such as after a surgery, injury, or serious illness. Or it may result from sitting for a long time, especially when traveling long distances. Abnormal clotting Some people have blood that clots too easily or too quickly. Problems that may cause increased clotting include: 
· Having certain blood problems that make blood clot too easily. This is a problem that may run in families. · Having certain health problems, such as cancer, heart failure, stroke, or severe infection. · Being pregnant. A woman's risk of getting blood clots increases both during pregnancy and shortly after delivery or after a  section. · Using hormonal forms of birth control or hormone therapy. · Smoking. Injury to the blood vessel wall Blood is more likely to clot in veins and arteries shortly after they are injured. Injury can be caused by a recent medical procedure or surgery that involved your legs, hips, belly, or brain. Or it can be caused by an injury, such as a broken hip. What can you do to prevent blood clots? After any procedure or event that increases your risk · Take a blood-thinning medicine (called an anticoagulant) as directed if your doctor prescribes one.  
· Exercise your lower leg muscles to help keep the blood moving through your legs. Point your toes up toward your head so the calves of your legs are stretched, then relax. Repeat. This is a good exercise to do when you are sitting for long periods of time. · Get up out of bed as soon as you safely can or as soon as your doctor says it's okay after an illness or surgery. If you can't get out of bed, you can do the leg exercise described above. Try to do this leg exercise every hour when you are awake. This will help keep the blood moving through your legs. If you are in the hospital and need to stay in bed, your doctor may have you use a special device that inflates and deflates knee-high boots to help keep blood from pooling in your legs. · Use compression stockings if your doctor prescribes them. These are specially fitted stockings that may prevent blood clots by keeping blood from pooling in your legs. When you travel · Take breaks when you travel. On long car trips, stop the car and walk around every hour or so. On long bus or train rides or plane flights, get out of your seat and walk up and down the aisle every hour or so. · Do leg exercises while you are seated. For example, pump your feet up and down by pulling your toes up toward your knees and then pointing them down. If you already have a risk of blood clots, talk to your doctor before taking a long trip. Your doctor may want you to wear compression stockings or take blood-thinning medicine. Take care of your body · Be active. Try to get 30 minutes or more of activity on most days of the week. · Don't smoke. Smoking can increase your risk of blood clots. If you need help quitting, talk to your doctor about stop-smoking programs and medicines. · Check with your doctor about whether you should use hormonal forms of birth control or hormone therapy. These may increase your risk of blood clots. When should you call for help? Call 911 anytime you think you may need emergency care. For example, call if: · You have symptoms of a blood clot in your lung (called a pulmonary embolism). These may include: 
? Sudden chest pain. ? Trouble breathing. ? Coughing up blood. Call your doctor now or seek immediate medical care if: 
· You have symptoms of a blood clot in your arm or leg (called a deep vein thrombosis). These may include: 
? Pain in the arm, calf, back of the knee, thigh, or groin. ? Redness and swelling in the arm, leg, or groin. Where can you learn more? Go to http://danielle-irais.info/. Enter F229 in the search box to learn more about \"Learning About How to Prevent Blood Clots. \" Current as of: September 26, 2018 Content Version: 11.9 © 5203-5795 [employer]. Care instructions adapted under license by [employer] (which disclaims liability or warranty for this information). If you have questions about a medical condition or this instruction, always ask your healthcare professional. [place] [address] any warranty or liability for your use of this information. Tips to Prevent Blood Clots: After Your Visit Your Care Instructions Blood clots can lead to serious problems, including deep vein thrombosis (DVT) and pulmonary embolism. A DVT is a blood clot in certain veins of the legs, pelvis, or arms. If not treated, blood clots in these veins can get bigger, break loose, and travel through the bloodstream to the lungs. Pulmonary embolism is the sudden blockage of an artery in the lung, which can be life-threatening. Anything that makes you more likely to form blood clots increases your risk of DVT and pulmonary embolism. It is important to know your risk factors, as well as the steps you and your doctor can take to reduce your risk for blood clots. Follow-up care is a key part of your treatment and safety.  Be sure to make and go to all appointments, and call your doctor if you are having problems. It's also a good idea to know your test results and keep a list of the medicines you take. How can you care for yourself at home? Know what increases your risk for blood clots · You might be at risk for blood clots because of a medical procedure or hospital stay. Things that increase your risk include: ¨ Recent surgery that involved the legs or belly. ¨ Staying in bed for 72 hours or more after surgery or a serious illness. ¨ Some diseases, such as cancer, heart failure, stroke, or a severe infection. ¨ Pregnancy and childbirth (especially if you had a  section). · Other things can increase your risk for blood clots, including: ¨ Being inactive for long periods, or sitting for 6 hours or longer, such as on a flight or car trip. ¨ Having blood that clots too easily, a problem that may run in families. ¨ Taking birth control pills or hormone therapy. ¨ Smoking. ¨ Being overweight. To prevent a DVT or pulmonary embolism · If your doctor has prescribed anticoagulant medicines, take them exactly as directed. · Get up out of bed as soon as possible after an illness or surgery. · Exercise. Keep blood moving in your legs to keep clots from forming. If you are traveling by car, stop every hour or so. Get out and walk around for a few minutes. If you are traveling by bus, train, or plane, get out of your seat and walk up and down the aisles every hour or so. You also can do leg exercises while you are seated. Pump your feet up and down by pulling your toes up toward your knees and then pointing them down. · Do not smoke. If you need help quitting, talk to your doctor about stop-smoking programs and medicines. These can increase your chances of quitting for good. · Check with your doctor before taking birth control pills or hormone replacement therapy. These may increase your risk of blot clots.  
· Ask your doctor about wearing compression stockings to help prevent blood clots in your legs. You can buy these with a prescription at medical supply stores and some drugstores. When should you call for help? Call 911 anytime you think you may need emergency care. For example, call if: 
· You have signs of pulmonary embolism. These may include: 
¨ Sudden shortness of breath. ¨ Sudden, sharp chest pain that may get worse when you breathe deeply or cough. ¨ Coughing up blood or pink, foamy mucus. ¨ Fast heart rate. ¨ Severe anxiety. ¨ Fainting. Call your doctor now or seek immediate medical care if: 
· You have signs of a deep vein thrombosis. These may include: ¨ Swelling. ¨ Warmth. ¨ Pain or tenderness. The pain may be in the calf or thigh and may be present only when the affected area is touched or when standing or walking. ¨ Redness. Watch closely for changes in your health, and be sure to contact your doctor if: 
· You do not get better as expected. Where can you learn more? Go to GEOCOMtms.be Enter W382 in the search box to learn more about \"Tips to Prevent Blood Clots: After Your Visit. \"  
© 4785-6779 [employer]. Care instructions adapted under license by [facility] [employer] (which disclaims liability or warranty for this information). This care instruction is for use with your licensed healthcare professional. If you have questions about a medical condition or this instruction, always ask your healthcare professional. [place] [address] any warranty or liability for your use of this information. Content Version: 9.2.088616; Last Revised: June 1, 2011 Rivaroxaban (Xarelto, Xarelto Starter Pack) - (By mouth) Why this medicine is used:  
Treats and prevents blood clots. Contact a nurse or doctor right away if you have: 
· Sudden or severe headache · Back pain, numbness, tingling, weakness in your legs or feet · Loss of bladder or bowel control · Bloody vomit or vomit that looks like coffee grounds; bloody or black, tarry stools · Bleeding that does not stop or bruises that do not heal  
 
Common side effects: · Minor bleeding or bruising © 2017 [facility] Information is for End User's use only and may not be sold, redistributed or otherwise used for commercial purposes.

## 2019-07-02 ENCOUNTER — APPOINTMENT (OUTPATIENT)
Dept: CT IMAGING | Age: 29
End: 2019-07-02
Attending: PHYSICIAN ASSISTANT
Payer: COMMERCIAL

## 2019-07-02 ENCOUNTER — OFFICE VISIT (OUTPATIENT)
Dept: INTERNAL MEDICINE CLINIC | Age: 29
End: 2019-07-02

## 2019-07-02 ENCOUNTER — HOSPITAL ENCOUNTER (EMERGENCY)
Age: 29
Discharge: HOME | End: 2019-07-02
Attending: EMERGENCY MEDICINE
Payer: COMMERCIAL

## 2019-07-02 ENCOUNTER — APPOINTMENT (OUTPATIENT)
Dept: VASCULAR SURGERY | Age: 29
End: 2019-07-02
Attending: PHYSICIAN ASSISTANT
Payer: COMMERCIAL

## 2019-07-02 VITALS
HEART RATE: 89 BPM | WEIGHT: 236 LBS | BODY MASS INDEX: 37.93 KG/M2 | DIASTOLIC BLOOD PRESSURE: 80 MMHG | TEMPERATURE: 98.2 F | OXYGEN SATURATION: 99 % | RESPIRATION RATE: 22 BRPM | SYSTOLIC BLOOD PRESSURE: 140 MMHG | HEIGHT: 66 IN

## 2019-07-02 VITALS
OXYGEN SATURATION: 100 % | TEMPERATURE: 98.3 F | WEIGHT: 236 LBS | HEIGHT: 67 IN | RESPIRATION RATE: 20 BRPM | DIASTOLIC BLOOD PRESSURE: 68 MMHG | BODY MASS INDEX: 37.04 KG/M2 | HEART RATE: 87 BPM | SYSTOLIC BLOOD PRESSURE: 128 MMHG

## 2019-07-02 DIAGNOSIS — N83.201 OVARIAN CYST, RIGHT: ICD-10-CM

## 2019-07-02 DIAGNOSIS — R07.89 ATYPICAL CHEST PAIN: ICD-10-CM

## 2019-07-02 DIAGNOSIS — M25.561 ARTHRALGIA OF RIGHT LOWER LEG: ICD-10-CM

## 2019-07-02 DIAGNOSIS — N83.201 CYST OF RIGHT OVARY: ICD-10-CM

## 2019-07-02 DIAGNOSIS — Z86.711 HX OF PULMONARY EMBOLUS: Primary | ICD-10-CM

## 2019-07-02 DIAGNOSIS — M41.9 SCOLIOSIS OF THORACIC SPINE, UNSPECIFIED SCOLIOSIS TYPE: ICD-10-CM

## 2019-07-02 DIAGNOSIS — K43.9 SUPRAUMBILICAL HERNIA: ICD-10-CM

## 2019-07-02 LAB
ALBUMIN SERPL-MCNC: 3.1 G/DL (ref 3.5–5)
ALBUMIN/GLOB SERPL: 0.6 {RATIO} (ref 1.1–2.2)
ALP SERPL-CCNC: 79 U/L (ref 45–117)
ALT SERPL-CCNC: 14 U/L (ref 12–78)
ANION GAP SERPL CALC-SCNC: 9 MMOL/L (ref 5–15)
AST SERPL-CCNC: 12 U/L (ref 15–37)
BASOPHILS # BLD: 0 K/UL (ref 0–0.1)
BASOPHILS NFR BLD: 0 % (ref 0–1)
BILIRUB SERPL-MCNC: 0.1 MG/DL (ref 0.2–1)
BUN SERPL-MCNC: 6 MG/DL (ref 6–20)
BUN/CREAT SERPL: 11 (ref 12–20)
CALCIUM SERPL-MCNC: 9.2 MG/DL (ref 8.5–10.1)
CHLORIDE SERPL-SCNC: 102 MMOL/L (ref 97–108)
CO2 SERPL-SCNC: 28 MMOL/L (ref 21–32)
CREAT SERPL-MCNC: 0.56 MG/DL (ref 0.55–1.02)
DIFFERENTIAL METHOD BLD: ABNORMAL
EOSINOPHIL # BLD: 0.1 K/UL (ref 0–0.4)
EOSINOPHIL NFR BLD: 1 % (ref 0–7)
ERYTHROCYTE [DISTWIDTH] IN BLOOD BY AUTOMATED COUNT: 15.4 % (ref 11.5–14.5)
GLOBULIN SER CALC-MCNC: 5.2 G/DL (ref 2–4)
GLUCOSE SERPL-MCNC: 82 MG/DL (ref 65–100)
HCT VFR BLD AUTO: 30.7 % (ref 35–47)
HGB BLD-MCNC: 9.2 G/DL (ref 11.5–16)
IMM GRANULOCYTES # BLD AUTO: 0.1 K/UL (ref 0–0.04)
IMM GRANULOCYTES NFR BLD AUTO: 1 % (ref 0–0.5)
INR PPP: 1.1 (ref 0.9–1.1)
LYMPHOCYTES # BLD: 3.5 K/UL (ref 0.8–3.5)
LYMPHOCYTES NFR BLD: 27 % (ref 12–49)
MCH RBC QN AUTO: 23.9 PG (ref 26–34)
MCHC RBC AUTO-ENTMCNC: 30 G/DL (ref 30–36.5)
MCV RBC AUTO: 79.7 FL (ref 80–99)
MONOCYTES # BLD: 0.7 K/UL (ref 0–1)
MONOCYTES NFR BLD: 5 % (ref 5–13)
NEUTS SEG # BLD: 8.3 K/UL (ref 1.8–8)
NEUTS SEG NFR BLD: 66 % (ref 32–75)
NRBC # BLD: 0.02 K/UL (ref 0–0.01)
NRBC BLD-RTO: 0.2 PER 100 WBC
PLATELET # BLD AUTO: 422 K/UL (ref 150–400)
PMV BLD AUTO: 9.9 FL (ref 8.9–12.9)
POTASSIUM SERPL-SCNC: 4 MMOL/L (ref 3.5–5.1)
PROT SERPL-MCNC: 8.3 G/DL (ref 6.4–8.2)
PROTHROMBIN TIME: 10.8 SEC (ref 9–11.1)
RBC # BLD AUTO: 3.85 M/UL (ref 3.8–5.2)
SODIUM SERPL-SCNC: 139 MMOL/L (ref 136–145)
WBC # BLD AUTO: 12.7 K/UL (ref 3.6–11)

## 2019-07-02 PROCEDURE — 85025 COMPLETE CBC W/AUTO DIFF WBC: CPT

## 2019-07-02 PROCEDURE — 36415 COLL VENOUS BLD VENIPUNCTURE: CPT

## 2019-07-02 PROCEDURE — 80053 COMPREHEN METABOLIC PANEL: CPT

## 2019-07-02 PROCEDURE — 74011250636 HC RX REV CODE- 250/636: Performed by: PHYSICIAN ASSISTANT

## 2019-07-02 PROCEDURE — 71275 CT ANGIOGRAPHY CHEST: CPT

## 2019-07-02 PROCEDURE — 99283 EMERGENCY DEPT VISIT LOW MDM: CPT

## 2019-07-02 PROCEDURE — 74177 CT ABD & PELVIS W/CONTRAST: CPT

## 2019-07-02 PROCEDURE — 96374 THER/PROPH/DIAG INJ IV PUSH: CPT

## 2019-07-02 PROCEDURE — 93971 EXTREMITY STUDY: CPT

## 2019-07-02 PROCEDURE — 74011636320 HC RX REV CODE- 636/320: Performed by: EMERGENCY MEDICINE

## 2019-07-02 PROCEDURE — 85610 PROTHROMBIN TIME: CPT

## 2019-07-02 RX ORDER — SODIUM CHLORIDE 0.9 % (FLUSH) 0.9 %
5-10 SYRINGE (ML) INJECTION
Status: COMPLETED | OUTPATIENT
Start: 2019-07-02 | End: 2019-07-02

## 2019-07-02 RX ORDER — DIPHENHYDRAMINE HYDROCHLORIDE 50 MG/ML
25 INJECTION, SOLUTION INTRAMUSCULAR; INTRAVENOUS
Status: COMPLETED | OUTPATIENT
Start: 2019-07-02 | End: 2019-07-02

## 2019-07-02 RX ADMIN — IOPAMIDOL 95 ML: 755 INJECTION, SOLUTION INTRAVENOUS at 12:13

## 2019-07-02 RX ADMIN — IOPAMIDOL 70 ML: 755 INJECTION, SOLUTION INTRAVENOUS at 12:13

## 2019-07-02 RX ADMIN — DIPHENHYDRAMINE HYDROCHLORIDE 25 MG: 50 INJECTION INTRAMUSCULAR; INTRAVENOUS at 11:37

## 2019-07-02 RX ADMIN — Medication 10 ML: at 12:15

## 2019-07-02 NOTE — ED NOTES
Pt her for evaluation of rt leg cramping x 4 days. Pt has hx of blood clots in left leg and sent in by PCP to R/O rt leg clots. Pt sts she is compliant with her medication regiment. Emergency Department Nursing Plan of Care       The Nursing Plan of Care is developed from the Nursing assessment and Emergency Department Attending provider initial evaluation. The plan of care may be reviewed in the ED Provider note.     The Plan of Care was developed with the following considerations:   Patient / Family readiness to learn indicated by:verbalized understanding  Persons(s) to be included in education: patient and family  Barriers to Learning/Limitations:No    Signed     Antolin Urias RN    7/2/2019   10:25 AM

## 2019-07-02 NOTE — PROGRESS NOTES
CC:  Came to lab for blood work, c/o of problem SOB, L leg then Rt leg swelling  Gradually worsening past 4 days. HPI:  S/p recent hospitalization ~ 2 wks ago for PE, L leg clot. Had residual L leg swelling when 4 days ago developed gradually worsening SOB and noted swelling R leg w/ pain in distal posterior R leg and lower calf. Also noted intermittent sharp constant throbbing pain R chest x past 2 days radiating to Rt shoulder and sharp pain RLQ since last PM. Can't lay flat due to reflux. Started coughing intermittently during visit; nonproductive, no blood. Except for today, has taken rivaroxaban as prescribed. - Associated nausea x ~2 days. LMP ended 6.28.19.  - Denies fever, chills, diaphoresis, vomiting, constipation, diarrhea, melena. Unknown if hematochezia, doesn't look. Current Outpatient Medications:     rivaroxaban (XARELTO) 15 mg tab tablet, Take 1 Tab by mouth two (2) times daily (with meals) for 21 days. , Disp: 42 Tab, Rfl: 0    rivaroxaban (XARELTO) 20 mg tab tablet, Take 1 Tab by mouth daily (with breakfast) for 30 days. , Disp: 30 Tab, Rfl: 4    famotidine (PEPCID) 40 mg tablet, Take 1 Tab by mouth nightly., Disp: 30 Tab, Rfl: 3    ASSESSMENT  TREATMENT  PLAN:  1. SOB  - New onset  R/o new PE, CHF   - To ED    2. RLE swelling, posterior pain - New  R/o clot vs CHF vs possible hepatic or renal issue.  - To ED    3. RLQ pain - New  R/o early appendicitis, diverticular dis, ectopic, ovarian origin, thromboembolic related, referred pain lung related.    Unverified hx in record of ovarian cancer.  - To ED    PHYSICAL EXAM:  Vitals:    07/02/19 1005   BP: 140/80   Pulse: 89   Resp: 22   Temp: 98.2 °F (36.8 °C)   TempSrc: Oral   SpO2: 99%   Weight: 236 lb (107 kg)   Height: 5' 6\" (1.676 m)   PainSc:   5   PainLoc: Leg   LMP: 06/26/2019     CONST:  WD obese black woman mild distress appearing mildly breathless, minimally anxious    RESPIRATORY:   Effort WNL; no intercostal retractions or accessory muscle use; diaphragmatic movement WNL. Breath sounds WNL; no adventitious sounds or rubs. No dullness, flatness or hyperresonance. CARDIOVASCULAR: Heart - w/o thrills. PMI non palpable. S1, S2 reg. No murmurs, gallops, clicks or rubs. Vascular -abdominal aorta size WNL; no bruits  pedal pulses pulse amplitude WNL  Extremities w/ trace to 1+ L leg, trace R leg edema; L  And most of Rt calf nontender; tender very distal Rt calf and posterior ankle; L leg vaguely tender anteriorly. GASTROINTESTINAL:   Flat, obese; NABS w/o masses. Direct deep tenderness RLQ w/o rebound; no worsening of pain w/ jar or hopping. Liver 6 cm in RMCL, nontender. Liver & spleen w/o organomegaly. No hernias palpable. HISTORY:  ROS from first OV reviewed & updated as necessary. [place] reviewed & updated as necessary. Patient Active Problem List   Diagnosis Code    Pulmonary emboli (HCC) I26.99    PE (pulmonary thromboembolism) (HCC) I26.99    Obesity, morbid ([facility] [address] E66.01     MDM:  Amt & Complexity of Data (To Be) Ordered or Reviewed:  1. N/A    RISK: High    Professional Services:  PAIN:  - Addressed. CARE PLAN: - Patient participated in care planning, verbalized understanding of Plan and stated willingness to Participate in care. EDUCATION: - Provided appropriate to the patient's identified learning needs and barriers. - The most recent lab findings were reviewed with the patient. - After Visit Summary was printed and given to the patient. - Details of the assessment and plan were reviewed with the patient; all questions were answered.

## 2019-07-02 NOTE — ED TRIAGE NOTES
C/o RLE pain/\"heaviness\"/swelling x 4 days with right-sided abd/flank pain x 1 week or so, PMH of DVTs and PEs but is still taking Xarelto per pt

## 2019-07-02 NOTE — PROGRESS NOTES
Chief Complaint   Patient presents with    Shortness of Breath    Leg Pain     bilateral    Abdominal Pain     Unorthodox visit. Original visit lab work only. Patient sent to emergency room per DR Randolph Amato.

## 2019-07-02 NOTE — PROGRESS NOTES
Right lower extremity duplex complete. Report to follow.  Verbal preliminary given to [facility]

## 2019-07-02 NOTE — DISCHARGE INSTRUCTIONS
Patient Education        Chest Pain: Care Instructions  Your Care Instructions    There are many things that can cause chest pain. Some are not serious and will get better on their own in a few days. But some kinds of chest pain need more testing and treatment. Your doctor may have recommended a follow-up visit in the next 8 to 12 hours. If you are not getting better, you may need more tests or treatment. Even though your doctor has released you, you still need to watch for any problems. The doctor carefully checked you, but sometimes problems can develop later. If you have new symptoms or if your symptoms do not get better, get medical care right away. If you have worse or different chest pain or pressure that lasts more than 5 minutes or you passed out (lost consciousness), call 911 or seek other emergency help right away. A medical visit is only one step in your treatment. Even if you feel better, you still need to do what your doctor recommends, such as going to all suggested follow-up appointments and taking medicines exactly as directed. This will help you recover and help prevent future problems. How can you care for yourself at home? · Rest until you feel better. · Take your medicine exactly as prescribed. Call your doctor if you think you are having a problem with your medicine. · Do not drive after taking a prescription pain medicine. When should you call for help? Call 911 if:    · You passed out (lost consciousness).     · You have severe difficulty breathing.     · You have symptoms of a heart attack. These may include:  ? Chest pain or pressure, or a strange feeling in your chest.  ? Sweating. ? Shortness of breath. ? Nausea or vomiting. ? Pain, pressure, or a strange feeling in your back, neck, jaw, or upper belly or in one or both shoulders or arms. ? Lightheadedness or sudden weakness. ? A fast or irregular heartbeat.   After you call 911, the  may tell you to chew 1 adult-strength or 2 to 4 low-dose aspirin. Wait for an ambulance. Do not try to drive yourself.    Call your doctor today if:    · You have any trouble breathing.     · Your chest pain gets worse.     · You are dizzy or lightheaded, or you feel like you may faint.     · You are not getting better as expected.     · You are having new or different chest pain. Where can you learn more? Go to http://danielle-irais.info/. Enter A120 in the search box to learn more about \"Chest Pain: Care Instructions. \"  Current as of: September 23, 2018  Content Version: 11.9  © 4138-7803 [employer]. Care instructions adapted under license by [employer] (which disclaims liability or warranty for this information). If you have questions about a medical condition or this instruction, always ask your healthcare professional. [place] [address] any warranty or liability for your use of this information. Patient Education        Functional Ovarian Cyst: Care Instructions  Your Care Instructions    A functional ovarian cyst is a sac that forms on the surface of a woman's ovary during ovulation. The sac holds a maturing egg. Usually the sac goes away after the egg is released. But if the egg is not released, or if the sac closes up after the egg is released, the sac can swell up with fluid and form a cyst.  Functional ovarian cysts are different than ovarian growths caused by other problems, such as cancer. Most functional ovarian cysts cause no symptoms and go away on their own. Some cause mild pain. Others can cause severe pain when they rupture or bleed. Follow-up care is a key part of your treatment and safety. Be sure to make and go to all appointments, and call your doctor if you are having problems. It's also a good idea to know your test results and keep a list of the medicines you take. How can you care for yourself at home?   · Use heat, such as a hot water bottle, a heating pad set on low, or a warm bath, to relax tense muscles and relieve cramping. · Be safe with medicines. Take pain medicines exactly as directed. ? If the doctor gave you a prescription medicine for pain, take it as prescribed. ? If you are not taking a prescription pain medicine, ask your doctor if you can take an over-the-counter medicine. · Avoid constipation. Make sure you drink enough fluids and include fruits, vegetables, and fiber in your diet each day. Constipation does not cause ovarian cysts, but it may make your pelvic pain worse. When should you call for help? Call your doctor now or seek immediate medical care if:    · You have severe vaginal bleeding.     · You have new or worse belly or pelvic pain.    Watch closely for changes in your health, and be sure to contact your doctor if:    · You have unusual vaginal bleeding.     · You do not get better as expected. Where can you learn more? Go to http://danielle-irais.info/. Enter S541 in the search box to learn more about \"Functional Ovarian Cyst: Care Instructions. \"  Current as of: May 14, 2018  Content Version: 11.9  © 3595-5976 [employer]. Care instructions adapted under license by [employer] (which disclaims liability or warranty for this information). If you have questions about a medical condition or this instruction, always ask your healthcare professional. [address] any warranty or liability for your use of this information.

## 2019-07-02 NOTE — ED PROVIDER NOTES
EMERGENCY DEPARTMENT HISTORY AND PHYSICAL EXAM    Date: 7/2/2019  Patient Name: Efren Marks    History of Presenting Illness     Chief Complaint   Patient presents with    Abdominal Pain    Leg Swelling         History Provided By: Patient    HPI: Efren Marks is a 25 y.o. female with a PMH of hypertension, ovarian cancer, MS, DVT and PE who presents with right lower extremity tingling x4 days, right-sided chest pain x2 days and right lower quadrant abdominal pain since yesterday. Patient rates pain 8 out of 10. Patient was recently seen and admitted to the hospital after ER visit on 6/12/2019. Patient was diagnosed with a DVT in the left leg and a PE. Patient was discharged on Xarelto and has been taking that since discharge. Patient seen by PCP today with previously listed complaints and advised to come here for further evaluation of possible new PE/DVT and/or new appendicitis. Patient states leg feels the same as the left one when she was diagnosed. Patient denies any shortness of breath, nausea, vomiting, fever or chills. There are no exacerbating or alleviating factors. PCP: None    Current Outpatient Medications   Medication Sig Dispense Refill    rivaroxaban (XARELTO) 15 mg tab tablet Take 1 Tab by mouth two (2) times daily (with meals) for 21 days. 42 Tab 0    rivaroxaban (XARELTO) 20 mg tab tablet Take 1 Tab by mouth daily (with breakfast) for 30 days. 30 Tab 4    famotidine (PEPCID) 40 mg tablet Take 1 Tab by mouth nightly. 30 Tab 3       Past History     Past Medical History:  Past Medical History:   Diagnosis Date    Cancer ([address]     ovarian     Hypertension     with pregnancy    Ill-defined condition     MS    Ill-defined condition     TIMOTHY 1 with last pap       Past Surgical History:  History reviewed. No pertinent surgical history.     Family History:  Family History   Problem Relation Age of Onset    Kidney Disease Mother     Cancer Father        Social History:  Social History     Tobacco Use    Smoking status: Former Smoker     Types: Cigars    Smokeless tobacco: Never Used   Substance Use Topics    Alcohol use: Yes     Alcohol/week: 1.8 oz     Types: 3 Shots of liquor per week     Frequency: 2-3 times a week     Drinks per session: 1 or 2     Comment: occ    Drug use: No       Allergies: Allergies   Allergen Reactions    Shellfish Derived Swelling         Review of Systems   Review of Systems   Constitutional: Negative for chills and fever. Respiratory: Positive for cough. Negative for shortness of breath. Cardiovascular: Positive for chest pain. Gastrointestinal: Positive for abdominal pain. Negative for nausea and vomiting. Genitourinary: Negative for dysuria and frequency. Musculoskeletal: Negative for gait problem and joint swelling. Skin: Negative. Neurological: Negative for speech difficulty, weakness and numbness. All other systems reviewed and are negative. Physical Exam     Vitals:    07/02/19 0959 07/02/19 1145 07/02/19 1216   BP: 138/85 120/62 128/68   Pulse: 87     Resp: 18 20 20   Temp: 98.3 °F (36.8 °C)     SpO2: 97% 100% 100%   Weight: 107 kg (236 lb)     Height: 5' 7\" (1.702 m)       Physical Exam   Constitutional: She is oriented to person, place, and time. She appears well-developed and well-nourished. No distress. HENT:   Head: Normocephalic and atraumatic. Eyes: Conjunctivae are normal.   Cardiovascular: Normal rate, regular rhythm and normal heart sounds. Pulmonary/Chest: Effort normal and breath sounds normal. No respiratory distress. She has no wheezes. She has no rales. Abdominal: Soft. Bowel sounds are normal. There is tenderness in the right lower quadrant. There is rebound and tenderness at McBurney's point. There is no rigidity, no guarding and no CVA tenderness. Musculoskeletal:        Right lower leg: She exhibits no tenderness, no bony tenderness, no swelling, no edema and no deformity.    Neurological: She is alert and oriented to person, place, and time. Skin: Skin is warm and dry. Psychiatric: She has a normal mood and affect. Her behavior is normal. Judgment and thought content normal.   Nursing note and vitals reviewed. at 12:11 PM    Diagnostic Study Results     Labs -     Recent Results (from the past 12 hour(s))   CBC WITH AUTOMATED DIFF    Collection Time: 07/02/19 11:03 AM   Result Value Ref Range    WBC 12.7 (H) 3.6 - 11.0 K/uL    RBC 3.85 3.80 - 5.20 M/uL    HGB 9.2 (L) 11.5 - 16.0 g/dL    HCT 30.7 (L) 35.0 - 47.0 %    MCV 79.7 (L) 80.0 - 99.0 FL    MCH 23.9 (L) 26.0 - 34.0 PG    MCHC 30.0 30.0 - 36.5 g/dL    RDW 15.4 (H) 11.5 - 14.5 %    PLATELET 677 (H) 612 - 400 K/uL    MPV 9.9 8.9 - 12.9 FL    NRBC 0.2 (H) 0  WBC    ABSOLUTE NRBC 0.02 (H) 0.00 - 0.01 K/uL    NEUTROPHILS 66 32 - 75 %    LYMPHOCYTES 27 12 - 49 %    MONOCYTES 5 5 - 13 %    EOSINOPHILS 1 0 - 7 %    BASOPHILS 0 0 - 1 %    IMMATURE GRANULOCYTES 1 (H) 0.0 - 0.5 %    ABS. NEUTROPHILS 8.3 (H) 1.8 - 8.0 K/UL    ABS. LYMPHOCYTES 3.5 0.8 - 3.5 K/UL    ABS. MONOCYTES 0.7 0.0 - 1.0 K/UL    ABS. EOSINOPHILS 0.1 0.0 - 0.4 K/UL    ABS. BASOPHILS 0.0 0.0 - 0.1 K/UL    ABS. IMM. GRANS. 0.1 (H) 0.00 - 0.04 K/UL    DF AUTOMATED     METABOLIC PANEL, COMPREHENSIVE    Collection Time: 07/02/19 11:03 AM   Result Value Ref Range    Sodium 139 136 - 145 mmol/L    Potassium 4.0 3.5 - 5.1 mmol/L    Chloride 102 97 - 108 mmol/L    CO2 28 21 - 32 mmol/L    Anion gap 9 5 - 15 mmol/L    Glucose 82 65 - 100 mg/dL    BUN 6 6 - 20 MG/DL    Creatinine 0.56 0.55 - 1.02 MG/DL    BUN/Creatinine ratio 11 (L) 12 - 20      GFR est AA >60 >60 ml/min/1.73m2    GFR est non-AA >60 >60 ml/min/1.73m2    Calcium 9.2 8.5 - 10.1 MG/DL    Bilirubin, total 0.1 (L) 0.2 - 1.0 MG/DL    ALT (SGPT) 14 12 - 78 U/L    AST (SGOT) 12 (L) 15 - 37 U/L    Alk.  phosphatase 79 45 - 117 U/L    Protein, total 8.3 (H) 6.4 - 8.2 g/dL    Albumin 3.1 (L) 3.5 - 5.0 g/dL    Globulin 5.2 (H) 2.0 - 4.0 g/dL    A-G Ratio 0.6 (L) 1.1 - 2.2     PROTHROMBIN TIME + INR    Collection Time: 07/02/19 11:03 AM   Result Value Ref Range    INR 1.1 0.9 - 1.1      Prothrombin time 10.8 9.0 - 11.1 sec       Radiologic Studies -   CTA CHEST W OR W WO CONT   Final Result   IMPRESSION:         Interval partial lysis of right pulmonary artery thrombus   Interval complete lysis of left pulmonary artery thrombus. No evidence of pulmonary infarction   Fat-containing supraumbilical hernia. No acute abdominal or pelvic process seen      CT ABD PELV W CONT   Final Result   IMPRESSION:         Interval partial lysis of right pulmonary artery thrombus   Interval complete lysis of left pulmonary artery thrombus. No evidence of pulmonary infarction   Fat-containing supraumbilical hernia. No acute abdominal or pelvic process seen        CT Results  (Last 48 hours)               07/02/19 1213  CTA CHEST W OR W WO CONT Final result    Impression:  IMPRESSION:           Interval partial lysis of right pulmonary artery thrombus   Interval complete lysis of left pulmonary artery thrombus. No evidence of pulmonary infarction   Fat-containing supraumbilical hernia. No acute abdominal or pelvic process seen       Narrative:  EXAM: CTA CHEST W OR W WO CONT, CT ABD PELV W CONT       INDICATION: evaluate for new PE. Right lower extremity pain, heaviness, and   swelling for 4 days. Right-sided abdominal and flank pain for one week. History   of prior DVT and PE. Patient is still taking Xarelto. COMPARISON: 6/12/2019. Rina Prieto CONTRAST: 100 mL of Isovue-370. TECHNIQUE:    Precontrast  images were obtained to localize the volume for acquisition. Multislice helical CT arteriography was performed from the diaphragm to the   thoracic inlet during uneventful rapid bolus intravenous contrast   administration. Then contiguous slices were obtained through the abdomen and   pelvis. Lung and soft tissue windows were generated.   Coronal and sagittal   images were generated and 3D post processing consisting of coronal maximum   intensity images was performed. CT dose reduction was achieved through use of a   standardized protocol tailored for this examination and automatic exposure   control for dose modulation. FINDINGS:   The lungs are clear of mass, nodule, airspace disease or edema. There is been partial lysis of the cast extending into the right upper pulmonary   artery (series 3, axial images 70 through 76). There has been complete lysis of   the pulmonary embolism in the left main pulmonary artery. There is no mediastinal or hilar adenopathy or mass. The aorta enhances normally   without evidence of aneurysm or dissection. The visualized portions of the upper abdominal organs are normal.       There is a fat-containing supraumbilical hernia (series 6, image 44). LIVER: No mass or biliary dilatation. GALLBLADDER: Unremarkable. SPLEEN: No mass. PANCREAS: No mass or ductal dilatation. ADRENALS: Unremarkable. KIDNEYS: No mass, calculus, or hydronephrosis. STOMACH: Unremarkable. SMALL BOWEL: No dilatation or wall thickening. COLON: No dilatation or wall thickening. APPENDIX: Unremarkable. Axial image 58   PERITONEUM: No ascites or pneumoperitoneum. RETROPERITONEUM: No lymphadenopathy or aortic aneurysm. REPRODUCTIVE ORGANS: Anteverted uterus. Right ovarian 2.9 cm cyst.   URINARY BLADDER: No mass or calculus. BONES: No destructive bone lesion. There is an S-shaped thoracolumbar scoliosis. ADDITIONAL COMMENTS: N/A           07/02/19 1203  CT ABD PELV W CONT Final result    Impression:  IMPRESSION:           Interval partial lysis of right pulmonary artery thrombus   Interval complete lysis of left pulmonary artery thrombus. No evidence of pulmonary infarction   Fat-containing supraumbilical hernia.    No acute abdominal or pelvic process seen       Narrative:  EXAM: CTA CHEST W OR W WO CONT, CT ABD PELV W CONT       INDICATION: evaluate for new PE. Right lower extremity pain, heaviness, and   swelling for 4 days. Right-sided abdominal and flank pain for one week. History   of prior DVT and PE. Patient is still taking Xarelto. [facility]2019[facility] Isovue-370. TECHNIQUE:    Precontrast  images were obtained to localize the volume for acquisition. Multislice helical CT arteriography was performed from the diaphragm to the   thoracic inlet during uneventful rapid bolus intravenous contrast   administration. Then contiguous slices were obtained through the abdomen and   pelvis. Lung and soft tissue windows were generated. Coronal and sagittal   images were generated and 3D post processing consisting of coronal maximum   intensity images was performed. CT dose reduction was achieved through use of a   standardized protocol tailored for this examination and automatic exposure   control for dose modulation. FINDINGS:   The lungs are clear of mass, nodule, airspace disease or edema. There is been partial lysis of the cast extending into the right upper pulmonary   artery (series 3, axial images 70 through 76). There has been complete lysis of   the pulmonary embolism in the left main pulmonary artery. There is no mediastinal or hilar adenopathy or mass. The aorta enhances normally   without evidence of aneurysm or dissection. The visualized portions of the upper abdominal organs are normal.       There is a fat-containing supraumbilical hernia (series 6, image 44). LIVER: No mass or biliary dilatation. GALLBLADDER: Unremarkable. SPLEEN: No mass. PANCREAS: No mass or ductal dilatation. ADRENALS: Unremarkable. KIDNEYS: No mass, calculus, or hydronephrosis. STOMACH: Unremarkable. SMALL BOWEL: No dilatation or wall thickening. COLON: No dilatation or wall thickening. APPENDIX: Unremarkable.  Axial image 58   PERITONEUM: No ascites or pneumoperitoneum. RETROPERITONEUM: No lymphadenopathy or aortic aneurysm. REPRODUCTIVE ORGANS: Anteverted uterus. Right ovarian 2.9 cm cyst.   URINARY BLADDER: No mass or calculus. BONES: No destructive bone lesion. There is an S-shaped thoracolumbar scoliosis. ADDITIONAL COMMENTS: N/A               CXR Results  (Last 48 hours)    None            Medical Decision Making   I am the first provider for this patient. I reviewed the vital signs, available nursing notes, past medical history, past surgical history, family history and social history. Vital Signs-Reviewed the patient's vital signs. Records Reviewed: Old Medical Records, Previous Radiology Studies and Previous Laboratory Studies    Disposition:  Discharged    DISCHARGE NOTE:   1:19 PM      Care plan outlined and precautions discussed. Patient has no new complaints, changes, or physical findings. Results of labs and CT's were reviewed with the patient. All medications were reviewed with the patient; will d/c home. All of pt's questions and concerns were addressed. Patient was instructed and agrees to follow up with PCP prn, as well as to return to the ED upon further deterioration. Patient is ready to go home. Follow-up Information     Follow up With Specialties Details Why Contact Info    Garcia Davis MD Internal Medicine Schedule an appointment as soon as possible for a visit in 1 week As needed [id number] [facility]  [phone]            Discharge Medication List as of 7/2/2019  1:08 PM      CONTINUE these medications which have NOT CHANGED    Details   !! rivaroxaban (XARELTO) 15 mg tab tablet Take 1 Tab by mouth two (2) times daily (with meals) for 21 days. , Normal, Disp-42 Tab, R-0      !! rivaroxaban (XARELTO) 20 mg tab tablet Take 1 Tab by mouth daily (with breakfast) for 30 days. , Normal, Disp-30 Tab, R-4      famotidine (PEPCID) 40 mg tablet Take 1 Tab by mouth nightly., Normal, Disp-30 Tab, R-3       !! - Potential duplicate medications found. Please discuss with provider. Provider Notes (Medical Decision Making):   DDX: Appendicitis, UTI, new PE, DVT on the right leg, pregnancy, ovarian cysts      Procedures        Diagnosis     Clinical Impression:   1. Hx of pulmonary embolus    2. Cyst of right ovary    3. Arthralgia of right lower leg    4.  Atypical chest pain

## 2019-07-02 NOTE — ED NOTES
Discharge summary and discharge medications reviewed with patient and appropriate educational materials and side effects teaching were provided. patient  Given 0 paper prescriptions and 0 electronic prescriptions sent to pt's listed pharmacy. Patient verbalized understanding of the importance of discussing medications with his or her physician or clinic they will be following up with. No si/s of acute distress prior to discharge. Patient offered wheelchair from treatment area to hospital entrance, patient declined wheelchair. Declined work excuse.

## 2019-07-03 LAB
ALBUMIN SERPL-MCNC: 3.8 G/DL (ref 3.5–5.5)
ALBUMIN/GLOB SERPL: 1 {RATIO} (ref 1.2–2.2)
ALP SERPL-CCNC: 77 IU/L (ref 39–117)
ALT SERPL-CCNC: 8 IU/L (ref 0–32)
AST SERPL-CCNC: 12 IU/L (ref 0–40)
BILIRUB SERPL-MCNC: <0.2 MG/DL (ref 0–1.2)
BUN SERPL-MCNC: 5 MG/DL (ref 6–20)
BUN/CREAT SERPL: 10 (ref 9–23)
CALCIUM SERPL-MCNC: 9.5 MG/DL (ref 8.7–10.2)
CHLORIDE SERPL-SCNC: 101 MMOL/L (ref 96–106)
CO2 SERPL-SCNC: 25 MMOL/L (ref 20–29)
CREAT SERPL-MCNC: 0.5 MG/DL (ref 0.57–1)
ERYTHROCYTE [DISTWIDTH] IN BLOOD BY AUTOMATED COUNT: 15.4 % (ref 12.3–15.4)
GLOBULIN SER CALC-MCNC: 3.8 G/DL (ref 1.5–4.5)
GLUCOSE SERPL-MCNC: 87 MG/DL (ref 65–99)
HCT VFR BLD AUTO: 28.5 % (ref 34–46.6)
HGB BLD-MCNC: 8.6 G/DL (ref 11.1–15.9)
MCH RBC QN AUTO: 23.8 PG (ref 26.6–33)
MCHC RBC AUTO-ENTMCNC: 30.2 G/DL (ref 31.5–35.7)
MCV RBC AUTO: 79 FL (ref 79–97)
PLATELET # BLD AUTO: 421 X10E3/UL (ref 150–450)
POTASSIUM SERPL-SCNC: 4.6 MMOL/L (ref 3.5–5.2)
PROT SERPL-MCNC: 7.6 G/DL (ref 6–8.5)
RBC # BLD AUTO: 3.61 X10E6/UL (ref 3.77–5.28)
SODIUM SERPL-SCNC: 139 MMOL/L (ref 134–144)
T3 SERPL-MCNC: 164 NG/DL (ref 71–180)
T4 FREE SERPL-MCNC: 1.05 NG/DL (ref 0.82–1.77)
TSH SERPL DL<=0.005 MIU/L-ACNC: 0.76 UIU/ML (ref 0.45–4.5)
WBC # BLD AUTO: 12.3 X10E3/UL (ref 3.4–10.8)

## 2019-07-03 NOTE — PROGRESS NOTES
Visit charted 7/3/19  Spiritual Care Partner Volunteer visited patient in [place] [address] at [facility] on 7/2/19.   Documented by:  Stevie Farr

## 2019-07-12 ENCOUNTER — OFFICE VISIT (OUTPATIENT)
Dept: INTERNAL MEDICINE CLINIC | Age: 29
End: 2019-07-12

## 2019-07-12 DIAGNOSIS — M79.604 RIGHT LEG PAIN: Primary | ICD-10-CM

## 2019-07-16 ENCOUNTER — OFFICE VISIT (OUTPATIENT)
Dept: INTERNAL MEDICINE CLINIC | Age: 29
End: 2019-07-16

## 2019-07-16 VITALS
TEMPERATURE: 98.8 F | WEIGHT: 250 LBS | DIASTOLIC BLOOD PRESSURE: 76 MMHG | RESPIRATION RATE: 20 BRPM | OXYGEN SATURATION: 98 % | BODY MASS INDEX: 40.18 KG/M2 | HEART RATE: 101 BPM | SYSTOLIC BLOOD PRESSURE: 118 MMHG | HEIGHT: 66 IN

## 2019-07-16 DIAGNOSIS — I82.492 ACUTE DEEP VEIN THROMBOSIS (DVT) OF OTHER SPECIFIED VEIN OF LEFT LOWER EXTREMITY (HCC): ICD-10-CM

## 2019-07-16 DIAGNOSIS — R77.1 ELEVATED SERUM GLOBULIN LEVEL: ICD-10-CM

## 2019-07-16 DIAGNOSIS — D64.9 ANEMIA, UNSPECIFIED TYPE: ICD-10-CM

## 2019-07-16 DIAGNOSIS — I26.99 OTHER ACUTE PULMONARY EMBOLISM WITHOUT ACUTE COR PULMONALE (HCC): Primary | ICD-10-CM

## 2019-07-16 NOTE — PROGRESS NOTES
Chief Complaint   Patient presents with    Leg Pain     f/u from ED           1. Have you been to the ER, urgent care clinic since your last visit? Hospitalized since your last visit? Yes When: 07/02/19 Where: [address] [place] [address] ED Reason for visit: leg pain    2. Have you seen or consulted any other health care providers outside of the [address] [place] [address] since your last visit? Include any pap smears or colon screening.  No

## 2019-07-16 NOTE — PATIENT INSTRUCTIONS
Return to get your blood work done Monday - Thursday , 8 am to 11:30 am. Tell the [employer] you are just here for the lab.

## 2019-08-12 ENCOUNTER — HOSPITAL ENCOUNTER (EMERGENCY)
Age: 29
Discharge: HOME | End: 2019-08-12
Attending: EMERGENCY MEDICINE
Payer: COMMERCIAL

## 2019-08-12 VITALS
HEIGHT: 67 IN | RESPIRATION RATE: 18 BRPM | TEMPERATURE: 98.7 F | BODY MASS INDEX: 39.24 KG/M2 | OXYGEN SATURATION: 100 % | SYSTOLIC BLOOD PRESSURE: 147 MMHG | DIASTOLIC BLOOD PRESSURE: 81 MMHG | HEART RATE: 97 BPM | WEIGHT: 250 LBS

## 2019-08-12 DIAGNOSIS — S39.012A BACK STRAIN, INITIAL ENCOUNTER: ICD-10-CM

## 2019-08-12 DIAGNOSIS — M79.671 RIGHT FOOT PAIN: Primary | ICD-10-CM

## 2019-08-12 PROCEDURE — 99282 EMERGENCY DEPT VISIT SF MDM: CPT

## 2019-08-12 PROCEDURE — 77030036687 HC SHOE PSTOP S2SG -A

## 2019-08-12 RX ORDER — METHOCARBAMOL 750 MG/1
750 TABLET, FILM COATED ORAL 4 TIMES DAILY
Qty: 20 TAB | Refills: 0 | Status: SHIPPED | OUTPATIENT
Start: 2019-08-12 | End: 2019-08-17

## 2019-08-12 RX ORDER — PREDNISONE 20 MG/1
60 TABLET ORAL DAILY
Qty: 15 TAB | Refills: 0 | Status: SHIPPED | OUTPATIENT
Start: 2019-08-12 | End: 2019-08-17

## 2019-08-12 NOTE — ED NOTES
Patient given copy of dc instructions and 0 paper script(s) and 1 electronic scripts. Patient verbalized understanding of instructions and script (s). Patient given a current medication reconciliation form and verbalized understanding of their medications. Patient  verbalized understanding of the importance of discussing medications with  his or her physician or clinic they will be following up with. Patient alert and oriented and in no acute distress. Patient offered wheelchair from treatment area to hospital entrance, patient declined wheelchair.

## 2019-08-12 NOTE — ED PROVIDER NOTES
EMERGENCY DEPARTMENT HISTORY AND PHYSICAL EXAM      Date: 8/12/2019  Patient Name: Jim Beckett    History of Presenting Illness     Chief Complaint   Patient presents with    Foot Pain    Back Pain     History Provided By: Patient    HPI: Jim Beckett, 25 y.o. female with past medical history significant for ovarian cancer, hypertension, multiple sclerosis, and DVT/PE who presents via private vehicle to the ED with cc of right lateral foot pain for the past 2 weeks as well as right scapular pain for the past 4 days. She states her symptoms both started approximately 2 weeks ago and have not improved. She states she has to sleep on her left side now because any kind of pressure on her right side causes her right scapula to hurt. She denies any fall or trauma. PMHx: Ovarian cancer, hypertension, MS, DVT/PE  Social Hx: Former smoker, occasional alcohol use, denies illegal drug use    PCP: Jerome Simmonds, MD    There are no other complaints, changes, or physical findings at this time. No current facility-administered medications on file prior to encounter. Current Outpatient Medications on File Prior to Encounter   Medication Sig Dispense Refill    rivaroxaban (XARELTO) 20 mg tab tablet Take  by mouth daily.  famotidine (PEPCID) 40 mg tablet Take 1 Tab by mouth nightly. 30 Tab 3     Past History     Past Medical History:  Past Medical History:   Diagnosis Date    Cancer ([address]     ovarian     Hypertension     with pregnancy    Ill-defined condition     MS    Ill-defined condition     TIMOTHY 1 with last pap     Past Surgical History:  History reviewed. No pertinent surgical history. Family History:  Family History   Problem Relation Age of Onset    Kidney Disease Mother     Cancer Father      Social History:  Social History     Tobacco Use    Smoking status: Former Smoker     Types: Cigars    Smokeless tobacco: Former User   Substance Use Topics    Alcohol use:  Yes     Alcohol/week: 3.0 standard drinks     Types: 3 Shots of liquor per week     Frequency: 2-3 times a week     Drinks per session: 1 or 2     Comment: occ    Drug use: No     Allergies: Allergies   Allergen Reactions    Shellfish Derived Swelling     Review of Systems   Review of Systems   Constitutional: Negative for chills and fever. HENT: Negative for congestion, rhinorrhea, sneezing and sore throat. Respiratory: Negative for shortness of breath. Cardiovascular: Negative for chest pain. Gastrointestinal: Negative for abdominal pain, nausea and vomiting. Musculoskeletal: Positive for arthralgias and back pain. Negative for myalgias and neck stiffness. Skin: Negative for rash. Neurological: Negative for dizziness, weakness and headaches. All other systems reviewed and are negative. Physical Exam   Physical Exam   Constitutional: She is oriented to person, place, and time. She appears well-developed and well-nourished. HENT:   Head: Normocephalic and atraumatic. Mouth/Throat: Oropharynx is clear and moist.   Eyes: Conjunctivae and EOM are normal.   Neck: Normal range of motion and full passive range of motion without pain. Neck supple. Cardiovascular: Normal rate, regular rhythm, S1 normal, S2 normal, normal heart sounds, intact distal pulses and normal pulses. No murmur heard. Pulmonary/Chest: Effort normal and breath sounds normal. No respiratory distress. She has no wheezes. Abdominal: Soft. Normal appearance and bowel sounds are normal. She exhibits no distension. There is no tenderness. There is no rebound. Musculoskeletal: Normal range of motion. Arms:       Feet:    Neurological: She is alert and oriented to person, place, and time. She has normal strength. Skin: Skin is warm, dry and intact. No rash noted. Psychiatric: She has a normal mood and affect.  Her speech is normal and behavior is normal. Judgment and thought content normal.   Nursing note and vitals reviewed. Diagnostic Study Results   Labs -   No results found for this or any previous visit (from the past 12 hour(s)). Radiologic Studies -   No orders to display     No results found. Medical Decision Making   I am the first provider for this patient. I reviewed the vital signs, available nursing notes, past medical history, past surgical history, family history and social history. Vital Signs-Reviewed the patient's vital signs. Patient Vitals for the past 12 hrs:   Temp Pulse Resp BP SpO2   08/12/19 0949 98.7 °F (37.1 °C) 97 18 147/81 100 %     Pulse Oximetry Analysis - 100% on RA    Records Reviewed: Nursing Notes    Provider Notes (Medical Decision Making):   27-year-old female presents with musculoskeletal pains doubt any known fall or injury. Offered patient imaging, but with no fall doubt that she has any fracture. Patient agrees with no imaging at this time. Will order a postop shoe, steroids, and muscle relaxer and have patient follow-up with her primary care doctor. If symptoms do not improve, will have patient return. ED Course:   Initial assessment performed. The patients presenting problems have been discussed, and they are in agreement with the care plan formulated and outlined with them. I have encouraged them to ask questions as they arise throughout their visit. Offered patient imaging of the right foot and the right scapula/back. Patient agrees with holding off on imaging at this time and medical management and if symptoms do not improve or worsen, increase either follow-up with primary care or return for further work-up. Progress Note:   Updated pt on all returned results and findings. Discussed the importance of proper follow up as referred below along with return precautions. Pt in agreement with the care plan and expresses agreement with and understanding of all items discussed. Disposition:  Discharge Note:  The pt is ready for discharge.  The pt's signs, symptoms, diagnosis, and discharge instructions have been discussed and pt has conveyed their understanding. The pt is to follow up as recommended or return to ER should their symptoms worsen. Plan has been discussed and pt is in agreement. PLAN:  1. Discharge Medication List as of 8/12/2019 10:59 AM      START taking these medications    Details   predniSONE (DELTASONE) 20 mg tablet Take 60 mg by mouth daily for 5 days. , Normal, Disp-15 Tab, R-0      methocarbamol (ROBAXIN) 750 mg tablet Take 1 Tab by mouth four (4) times daily for 5 days. , Normal, Disp-20 Tab, R-0         CONTINUE these medications which have NOT CHANGED    Details   rivaroxaban (XARELTO) 20 mg tab tablet Take  by mouth daily. , Historical Med      famotidine (PEPCID) 40 mg tablet Take 1 Tab by mouth nightly., Normal, Disp-30 Tab, R-3           2. Follow-up Information     Follow up With Specialties Details Why Contact Info    Jolene Estes MD Internal Medicine Schedule an appointment as soon as possible for a visit  [address] [place] [address] - [place] EMERGENCY DEPT Emergency Medicine  As needed, If symptoms worsen [facility] [place]  [phone]        Return to ED if worse     Diagnosis     Clinical Impression:   1. Right foot pain    2. Back strain, initial encounter            Please note that this dictation was completed with Dragon, computer voice recognition software. Quite often unanticipated grammatical, syntax, homophones, and other interpretive errors are inadvertently transcribed by the computer software. Please disregard these errors. Additionally, please excuse any errors that have escaped final proofreading.

## 2019-08-12 NOTE — ED TRIAGE NOTES
Patient c/o right foot pain x 2 weeks, denies injury  C/o right mid back pain x 4 days, denies injury

## 2019-08-12 NOTE — ED NOTES
Pt c/o rt foot pain x 2 wks and lower back pain x 4 days,denies any injury/fall. Emergency Department Nursing Plan of Care       The Nursing Plan of Care is developed from the Nursing assessment and Emergency Department Attending provider initial evaluation. The plan of care may be reviewed in the ED Provider note.     The Plan of Care was developed with the following considerations:   Patient / Family readiness to learn indicated by:verbalized understanding  Persons(s) to be included in education: patient  Barriers to Learning/Limitations:No    Signed     Colt Wallace RN    8/12/2019   11:08 AM

## 2019-08-12 NOTE — DISCHARGE INSTRUCTIONS
Patient Education        Back Strain: Care Instructions  Overview    A back strain happens when you overstretch, or pull, a muscle in your back. You may hurt your back in an accident or when you exercise or lift something. Sometimes you may not know how you hurt your back. Most back pain will get better with rest and time. You can take care of yourself at home to help your back heal.  Follow-up care is a key part of your treatment and safety. Be sure to make and go to all appointments, and call your doctor if you are having problems. It's also a good idea to know your test results and keep a list of the medicines you take. How can you care for yourself at home? · Try to stay as active as you can, but stop or reduce any activity that causes pain. · Put ice or a cold pack on the sore muscle for 10 to 20 minutes at a time to stop swelling. Try this every 1 to 2 hours for 3 days (when you are awake) or until the swelling goes down. Put a thin cloth between the ice pack and your skin. · After 2 or 3 days, apply a heating pad on low or a warm cloth to your back. Some doctors suggest that you go back and forth between hot and cold treatments. · Take pain medicines exactly as directed. ? If the doctor gave you a prescription medicine for pain, take it as prescribed. ? If you are not taking a prescription pain medicine, ask your doctor if you can take an over-the-counter medicine. · Try sleeping on your side with a pillow between your legs. Or put a pillow under your knees when you lie on your back. These measures can ease pain in your lower back. · Return to your usual level of activity slowly. When should you call for help? Call 911 anytime you think you may need emergency care. For example, call if:    · You are unable to move a leg at all.   [facility] your doctor now or seek immediate medical care if:    · You have new or worse symptoms in your legs, belly, or buttocks.  Symptoms may include:  ? Numbness or tingling. ? Weakness. ? Pain.     · You lose bladder or bowel control.    Watch closely for changes in your health, and be sure to contact your doctor if:    · You have a fever, lose weight, or don't feel well.     · You are not getting better as expected. Where can you learn more? Go to http://danielle-irais.info/. Enter O146 in the search box to learn more about \"Back Strain: Care Instructions. \"  Current as of: September 20, 2018  Content Version: 12.1  © 5203-3247 [employer]. Care instructions adapted under license by [employer] (which disclaims liability or warranty for this information). If you have questions about a medical condition or this instruction, always ask your healthcare professional. [address] any warranty or liability for your use of this information. Patient Education        Foot Pain: Care Instructions  Your Care Instructions  Foot injuries that cause pain and swelling are fairly common. Almost all sports or home repair projects can cause a misstep that ends up as foot pain. Normal wear and tear, especially as you get older, also can cause foot pain. Most minor foot injuries will heal on their own, and home treatment is usually all you need to do. If you have a severe injury, you may need tests and treatment. Follow-up care is a key part of your treatment and safety. Be sure to make and go to all appointments, and call your doctor if you are having problems. It's also a good idea to know your test results and keep a list of the medicines you take. How can you care for yourself at home? · Take pain medicines exactly as directed. ? If the doctor gave you a prescription medicine for pain, take it as prescribed. ? If you are not taking a prescription pain medicine, ask your doctor if you can take an over-the-counter medicine. · Rest and protect your foot. Take a break from any activity that may cause pain.   · Put ice or a cold pack on your foot for 10 to 20 minutes at a time. Put a thin cloth between the ice and your skin. · Prop up the sore foot on a pillow when you ice it or anytime you sit or lie down during the next 3 days. Try to keep it above the level of your heart. This will help reduce swelling. · Your doctor may recommend that you wrap your foot with an elastic bandage. Keep your foot wrapped for as long as your doctor advises. · If your doctor recommends crutches, use them as directed. · Wear roomy footwear. · As soon as pain and swelling end, begin gentle exercises of your foot. Your doctor can tell you which exercises will help. When should you call for help? Call 911 anytime you think you may need emergency care. For example, call if:    · Your foot turns pale, white, blue, or cold.    Call your doctor now or seek immediate medical care if:    · You cannot move or stand on your foot.     · Your foot looks twisted or out of its normal position.     · Your foot is not stable when you step down.     · You have signs of infection, such as:  ? Increased pain, swelling, warmth, or redness. ? Red streaks leading from the sore area. ? Pus draining from a place on your foot. ? A fever.     · Your foot is numb or tingly.    Watch closely for changes in your health, and be sure to contact your doctor if:    · You do not get better as expected.     · You have bruises from an injury that last longer than 2 weeks. Where can you learn more? Go to http://danielle-irais.info/. Enter X804 in the search box to learn more about \"Foot Pain: Care Instructions. \"  Current as of: September 20, 2018  Content Version: 12.1  © 3532-5652 [employer]. Care instructions adapted under license by [employer] (which disclaims liability or warranty for this information).  If you have questions about a medical condition or this instruction, always ask your healthcare professional. [employer] disclaims any warranty or liability for your use of this information.

## 2019-08-26 ENCOUNTER — OFFICE VISIT (OUTPATIENT)
Dept: INTERNAL MEDICINE CLINIC | Age: 29
End: 2019-08-26

## 2019-08-26 VITALS
HEART RATE: 107 BPM | HEIGHT: 67 IN | DIASTOLIC BLOOD PRESSURE: 80 MMHG | WEIGHT: 254.5 LBS | OXYGEN SATURATION: 98 % | SYSTOLIC BLOOD PRESSURE: 128 MMHG | BODY MASS INDEX: 39.94 KG/M2 | RESPIRATION RATE: 16 BRPM | TEMPERATURE: 98.4 F

## 2019-08-26 DIAGNOSIS — M89.8X1 PAIN OF RIGHT SCAPULA: ICD-10-CM

## 2019-08-26 DIAGNOSIS — R94.31 ABNORMAL EKG: ICD-10-CM

## 2019-08-26 DIAGNOSIS — M79.671 RIGHT FOOT PAIN: ICD-10-CM

## 2019-08-26 DIAGNOSIS — Z86.69 HISTORY OF OBSTRUCTIVE SLEEP APNEA: ICD-10-CM

## 2019-08-26 DIAGNOSIS — G47.33 OSA (OBSTRUCTIVE SLEEP APNEA): ICD-10-CM

## 2019-08-26 DIAGNOSIS — I26.99 PULMONARY EMBOLISM, BILATERAL (HCC): ICD-10-CM

## 2019-08-26 DIAGNOSIS — R00.0 TACHYCARDIA: Primary | ICD-10-CM

## 2019-08-26 NOTE — PROGRESS NOTES
Chief Complaint   Patient presents with   [facility] Follow Up     foot and back pain    Form Completion     1. Have you been to the ER, urgent care clinic since your last visit? Hospitalized since your last visit? Yes Reason for visit: foot and back pain    2. Have you seen or consulted any other health care providers outside of the [address] [place] [address] since your last visit? Include any pap smears or colon screening.  No

## 2019-08-26 NOTE — PROGRESS NOTES
CC:    Chief Complaint   Patient presents with   [facility] Follow Up     foot and back pain    Form Completion        HPI:    Hospital Follow Up    foot and back pain  ED Note: \"ED with cc of right lateral foot pain for the past 2 weeks as well as right scapular pain for the past 4 days. She states her symptoms both started approximately 2 weeks ago and have not improved. She states she has to sleep on her left side now because any kind of pressure on her right side causes her right scapula to hurt. She denies any fall or trauma. \"  \". .postop shoe, steroids, and muscle relaxer and have patient follow-up with her primary care doctor. 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 - wt increase 8 lbs since 2 mos ago  - Running around [email or web address] shopping.  - no coffee but 3 pepsi/d everyday  - my review: pulse in 2017 in 60s; wt was lower then    EKG Today: ABNORMAL   Sinus  Tachycardia Rate 102  -Left atrial enlargement.    -Abnormal precordial QRS contours -nondiagnostic for this age. Rhythm strip w/ 2 VECs, 2 PACs     Pulmonary Embolism, bilateral - 6/12/19  - still taking xarelto as directed; 6 mos total tx  - spotting around period time otherwise no bleeding, bruising, melena, hematochezia, hematemesis, hematuria, hemoptysis       Allergies   Allergen Reactions    Shellfish Derived Swelling       Current Outpatient Medications:     rivaroxaban (XARELTO) 20 mg tab tablet, Take  by mouth daily. , Disp: , Rfl:     famotidine (PEPCID) 40 mg tablet, Take 1 Tab by mouth nightly., Disp: 30 Tab, Rfl: 3      ASSESSMENT  TREATMENT  PLAN:  1. Right foot pain  Resolved. Nothing more to do    2. Pain of right scapula  Resolved. Nothing more to do    3. Tachycardia       Abnormal EKG  New. Asymptomatic. Review of chart post visit reveals no cardiology f/u  - AMB POC EKG ROUTINE W/ 12 LEADS, INTER & REP  - refer cardiology, Dr Dang Sanchez  - return for labs: tsh, t4, t3, cbc    4. Pulmonary embolism, bilateral ([address]  Resolving. Contin xarelto. 5. History of obstructive sleep apnea  Status unknown.  - SLEEP MEDICINE REFERRAL       PHYSICAL EXAM:  Vitals:    08/26/19 1457   BP: 128/80   Pulse: (!) 107   Resp: 16   Temp: 98.4 °F (36.9 °C)   TempSrc: Oral   SpO2: 98%   Weight: 254 lb 8 oz (115.4 kg)   Height: 5' 7\" (1.702 m)   PainSc:   0 - No pain     Weight Metrics 8/26/2019 8/12/2019 7/16/2019 7/2/2019 7/2/2019 6/27/2019 6/12/2019   Weight 254 lb 8 oz 250 lb 250 lb 236 lb 236 lb 249 lb 246 lb   BMI 39.86 kg/m2 39.16 kg/m2 40.35 kg/m2 36.96 kg/m2 38.09 kg/m2 40.19 kg/m2 39.71 kg/m2        Physical Exam   Constitutional: She is well-developed, well-nourished, and in no distress. Cardiovascular: Regular rhythm, S1 normal and S2 normal.  No extrasystoles are present. Tachycardia present. PMI is not displaced. Exam reveals no gallop and no friction rub. No murmur heard. Pulses:       Carotid pulses are 2+ on the right side, and 2+ on the left side. No LE edema. Pulmonary/Chest: Effort normal and breath sounds normal. No accessory muscle usage. No apnea, no tachypnea and no bradypnea. No respiratory distress. Musculoskeletal:   Right foot & rt scapula non-tender w/o edema or overlying skin changes. Psychiatric: Mood and affect normal. Her mood appears not anxious. Her affect is not labile and not inappropriate. She is not agitated. She is not apathetic. She does not have a flat affect. HISTORY - Additional:  ROS from first OV reviewed & updated as necessary.   [place] reviewed & updated as necessary. Active Ambulatory Problems     Diagnosis Date Noted    Pulmonary emboli ([facility] 06/12/2019    PE [facility] 06/12/2019    Obesity, morbid ([facility] 06/27/2019    Ovarian cyst, right 22/95/0719    Supraumbilical hernia 00/09/6276    Scoliosis of thoracic spine 07/02/2019    Right foot pain 08/27/2019    Pain of right scapula 08/27/2019    Tachycardia 08/27/2019    YAEL (obstructive sleep apnea) 08/27/2019    History of obstructive sleep apnea 08/27/2019     Resolved Ambulatory Problems     Diagnosis Date Noted    No Resolved Ambulatory Problems     Past Medical History:   Diagnosis Date    Cancer ([facility]     Hypertension     Ill-defined condition     Ill-defined condition      No past surgical history on file. Social History     Tobacco Use    Smoking status: Former Smoker     Types: Cigars    Smokeless tobacco: Former User   Substance Use Topics    Alcohol use: Yes     Alcohol/week: 3.0 standard drinks     Types: 3 Shots of liquor per week     Frequency: 2-3 times a week     Drinks per session: 1 or 2     Comment: occ     Family History   Problem Relation Age of Onset    Kidney Disease Mother     Cancer Father         MDM:   Amt & Complexity of Data (To Be) Ordered or Reviewed:©  1. Order and/or Review CLINICAL LAB TEST(S)  2. Order and/or Review RADIOLOGY TEST(S)  3. Order and/or Review MEDICAL TEST(S)  4. Decide to Obtain old records and/or history from someone other than patient  5. Review and [de-identified] old records     Risk:      Professional Services:  PAIN:  - N/A  CARE PLAN: - Patient participated in care planning, verbalized understanding of Plan and stated willingness to Participate in care   EDUCATION: - Provided appropriate to patient's identified learning needs and barriers. - POC lab findings reviewed with patient. - Details of Assessment  & Plan reviewed with patient; all questions answered.   - After Visit Summary printed and given to patient.

## 2019-08-27 PROBLEM — G47.33 OSA (OBSTRUCTIVE SLEEP APNEA): Status: ACTIVE | Noted: 2019-08-27

## 2019-08-27 PROBLEM — Z86.69 HISTORY OF OBSTRUCTIVE SLEEP APNEA: Status: ACTIVE | Noted: 2019-08-27

## 2019-08-27 PROBLEM — M79.671 RIGHT FOOT PAIN: Status: ACTIVE | Noted: 2019-08-27

## 2019-08-27 PROBLEM — M89.8X1 PAIN OF RIGHT SCAPULA: Status: ACTIVE | Noted: 2019-08-27

## 2019-08-27 PROBLEM — R00.0 TACHYCARDIA: Status: ACTIVE | Noted: 2019-08-27

## 2019-09-06 ENCOUNTER — OFFICE VISIT (OUTPATIENT)
Dept: SLEEP MEDICINE | Age: 29
End: 2019-09-06

## 2019-09-06 ENCOUNTER — DOCUMENTATION ONLY (OUTPATIENT)
Dept: SLEEP MEDICINE | Age: 29
End: 2019-09-06

## 2019-09-06 VITALS
BODY MASS INDEX: 39.55 KG/M2 | WEIGHT: 252 LBS | HEIGHT: 67 IN | DIASTOLIC BLOOD PRESSURE: 85 MMHG | SYSTOLIC BLOOD PRESSURE: 123 MMHG | HEART RATE: 94 BPM | OXYGEN SATURATION: 98 %

## 2019-09-06 DIAGNOSIS — G47.33 OSA (OBSTRUCTIVE SLEEP APNEA): Primary | ICD-10-CM

## 2019-09-06 NOTE — PROGRESS NOTES
[address] [place]. [place], [address]   Tel.  [phone]   Fax. [address] [facility]   [place], [address]   Tel.  [phone]   Fax. [phone] [address]   Tel.  [phone]   Fax. [phone]       Subjective:   Miguelito Dejesus is a 25 y.o. female seen for a positive airway pressure follow-up, last seen by Dr. Ashish Torres on 6/23/2017, prior notes reviewed in detail. In lab sleep test 8/2017 showed AHI of 8.1/hr with a lowest SaO2 of 89%. She has gained 25 pounds since the time of the test.    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  She has not been on CPAP for past 4 years, she was diagnosed with sleep apnea as a teenager. Yvette Delgado She would like to re-start PAP treatment. [place] Sleepiness Score: 4 and Modified F.O.S.Q. Score Total / 2: 19.5       Allergies   Allergen Reactions    Shellfish Derived Swelling       She has a current medication list which includes the following prescription(s): rivaroxaban and famotidine. .      She  has a past medical history of Cancer ([address] Hypertension, Ill-defined condition, and Ill-defined condition. Review of Systems:  Constitutional:  + significant weight gain in past 6 months  Eyes:  No blurred vision. CVS:  No significant chest pain  Pulm:  No significant shortness of breath  GI:  No significant nausea or vomiting  :  No significant nocturia  Musculoskeletal:  No significant joint pain at night  Skin:  No significant rashes  Neuro:  No significant dizziness   Psych:  No active mood issues      Sleep Review of Systems:notable for Negative difficulty falling asleep; Positive awakenings at night; Positive perceived regular dreaming; Negative nightmares; Negative early morning headaches; Negative memory problems; Negative concentration issues;  Negative chest pain; Positive shortness of breath, recent PE; Negative significant joint pain at night; Negative significant muscle pain at night; Negative rashes or itching; Negative heartburn; Negative significant mood issues; 0 afternoon naps per week;  Positive caffeine;  Negative history of any automobile or occupational accidents due to daytime drowsiness      Objective:     Visit Vitals  /85 (BP 1 Location: Left arm, BP Patient Position: Sitting)   Pulse 94   Ht 5' 7\" (1.702 m)   Wt 252 lb (114.3 kg)   SpO2 98%   BMI 39.47 kg/m²        General:   Alert, oriented, not in acute distress   Eyes:  Anicteric Sclerae; no obvious strabismus   Nose:  No obvious nasal septum deviation    Oropharynx:   Mallampati score 4, thick tongue base, uvula not seen due to low-lying soft palate, narrow tonsilo-pharyngeal pilars   Neck:   midline trachea,      Chest/Lungs:  Symmetrical lung expansion , clear lung fields on auscultation    CVS:  Normal rate, regular rhythm,  no JVD   Extremities:  No obvious rashes, no edema    Neuro:  No focal deficits; No obvious tremor    Psych:  Normal affect,  normal countenance       Assessment:        ICD-10-CM ICD-9-CM    1. YAEL (obstructive sleep apnea) G47.33 327.23 AMB SUPPLY ORDER   2. Adult BMI 39.0-39.9 kg/sq m Z68.39 V85.39        AHI = 8.1(8/2017). she is not currently on PAP therapy although when used in the past PAP shows benefit to the patient and is necessary for control of her sleep apnea. Plan:     Follow-up and Dispositions    · Return in about 3 months (around 12/6/2019). * New APAP device ordered    Orders Placed This Encounter    AMB SUPPLY ORDER     Diagnosis: (G47.33) YAEL (obstructive sleep apnea)  (primary encounter diagnosis)      Positive Airway Pressure Therapy: Duration of need: 99 months.   ResMed APAP Device: Minimum Pressure: 4 cmH2O, Maximum Pressure: 20 cmH2O. Ramp Time: 30 Minutes.   EPR: 2.   Heated Humidifier  Alberto Modem Access  Length of Need: 99 months    Mask Fitting evaluation and mask for patient preference      Nasal Pillows Combo Mask (Replace) 2 per month.  Nasal Pillows (Replace) 2 per month.  Nasal Cushion (Replace) 2 per month.  Nasal Interface Mask 1 every 3 months.  Full Face Mask 1 every 3 months.  Full Face Mask Cushion 1 per month.  Pos Airway pressure chin strap   Headgear 1 every 6 months.  Tubing with heating element 1 every 3 months.  Filter(s) Disposable 2 per month.  Filter(s) Non-Disposable 1 every 6 months. .   [address] [facility] for Humidifier (Replace) 1 every 6 months. ENRICO Tomlin; NPI: [id number]    Electronically signed. Date:- 09/06/19     * Counseling was provided regarding the importance of regular PAP use with emphasis on ensuring sufficient total sleep time, proper sleep hygiene, and safe driving. * Re-enforced proper and regular cleaning for the device. * She was asked to contact our office for any problems regarding  PAP therapy. 2.  Recommended a dedicated weight loss program through appropriate diet and exercise regimen as significant weight reduction has been shown to reduce severity of obstructive sleep apnea. Patient's phone number [phone] (home)  was reviewed and confirmed for accuracy. She gives permission for messages regarding results and appointments to be left at that number. ENRICO Tomlin, [address] [place] [address]  Electronically signed.  9/6/2019

## 2019-09-06 NOTE — PATIENT INSTRUCTIONS
[address] [facility] [place]. [place], [address]  Tel.  [phone]  Fax. 100 [facility] 60  [place], [address] [facility]  Tel.  [phone]  Fax. [phone] [address]  Tel.  [phone]  Fax. [phone]     Learning About CPAP for Sleep Apnea  What is CPAP? CPAP is a small machine that you use at home every night while you sleep. It increases air pressure in your throat to keep your airway open. When you have sleep apnea, this can help you sleep better so you feel much better. CPAP stands for \"continuous positive airway pressure. \"  The CPAP machine will have one of the following:  · A mask that covers your nose and mouth  · Prongs that fit into your nose  · A mask that covers your nose only, the most common type. This type is called NCPAP. The N stands for \"nasal.\"  Why is it done? CPAP is usually the best treatment for obstructive sleep apnea. It is the first treatment choice and the most widely used. Your doctor may suggest CPAP if you have:  · Moderate to severe sleep apnea. · Sleep apnea and coronary artery disease (CAD) or heart failure. How does it help? · CPAP can help you have more normal sleep, so you feel less sleepy and more alert during the daytime. · CPAP may help keep heart failure or other heart problems from getting worse. · NCPAP may help lower your blood pressure. · If you use CPAP, your bed partner may also sleep better because you are not snoring or restless. What are the side effects? Some people who use CPAP have:  · A dry or stuffy nose and a sore throat. · Irritated skin on the face. · Sore eyes. · Bloating. If you have any of these problems, work with your doctor to fix them. Here are some things you can try:  · Be sure the mask or nasal prongs fit well. · See if your doctor can adjust the pressure of your CPAP. · If your nose is dry, try a humidifier.   · If your nose is runny or stuffy, try decongestant medicine or a steroid nasal spray. If these things do not help, you might try a different type of machine. Some machines have air pressure that adjusts on its own. Others have air pressures that are different when you breathe in than when you breathe out. This may reduce discomfort caused by too much pressure in your nose. Where can you learn more? Go to [employer].be  Enter Erika Nolasco in the search box to learn more about \"Learning About CPAP for Sleep Apnea. \"   © 1197-0357 Healthwise, [employer]. Care instructions adapted under license by Kevin Lorenzana (which disclaims liability or warranty for this information). This care instruction is for use with your licensed healthcare professional. If you have questions about a medical condition or this instruction, always ask your healthcare professional. [address] any warranty or liability for your use of this information. Content Version: 3.5.31581; Last Revised: January 11, 2010  PROPER SLEEP HYGIENE    What to avoid  · Do not have drinks with caffeine, such as coffee or black tea, for 8 hours before bed. · Do not smoke or use other types of tobacco near bedtime. Nicotine is a stimulant and can keep you awake. · Avoid drinking alcohol late in the evening, because it can cause you to wake in the middle of the night. · Do not eat a big meal close to bedtime. If you are hungry, eat a light snack. · Do not drink a lot of water close to bedtime, because the need to urinate may wake you up during the night. · Do not read or watch TV in bed. Use the bed only for sleeping and sexual activity. What to try  · Go to bed at the same time every night, and wake up at the same time every morning. Do not take naps during the day. · Keep your bedroom quiet, dark, and cool. · Get regular exercise, but not within 3 to 4 hours of your bedtime. .  · Sleep on a comfortable pillow and mattress.   · If watching the clock makes you anxious, turn it facing away from you so you cannot see the time. · If you worry when you lie down, start a worry book. Well before bedtime, write down your worries, and then set the book and your concerns aside. · Try meditation or other relaxation techniques before you go to bed. · If you cannot fall asleep, get up and go to another room until you feel sleepy. Do something relaxing. Repeat your bedtime routine before you go to bed again. · Make your house quiet and calm about an hour before bedtime. Turn down the lights, turn off the TV, log off the computer, and turn down the volume on music. This can help you relax after a busy day. Drowsy Driving: The [employer] cites drowsiness as a causing factor in more than 303,826 police reported crashes annually, resulting in 76,000 injuries and 1,500 deaths. Other surveys suggest 55% of people polled have driven while drowsy in the past year, 23% had fallen asleep but not crashed, 3% crashed, and 2% had and accident due to drowsy driving. Who is at risk? Young Drivers: One study of drowsy driving accidents states that 55% of the drivers were under 25 years. Of those, 75% were male. Shift Workers and Travelers: People who work overnight or travel across time zones frequently are at higher risk of experiencing Circadian Rhythm Disorders. They are trying to work and function when their body is programed to sleep. Sleep Deprived: Lack of sleep has a serious impact on your ability to pay attention or focus on a task. Consistently getting less than the average of 8 hours your body needs creates partial or cumulative sleep deprivation. Untreated Sleep Disorders: Sleep Apnea, Narcolepsy, R.L.S., and other sleep disorders (untreated) prevent a person from getting enough restful sleep. This leads to excessive daytime sleepiness and increases the risk for drowsy driving accidents by up to 7 times.   Medications / Alcohol: Even over the counter medications can cause drowsiness. Medications that impair a drivers attention should have a warning label. Alcohol naturally makes you sleepy and on its own can cause accidents. Combined with excessive drowsiness its effects are amplified. Signs of Drowsy Driving:   * You don't remember driving the last few miles   * You may drift out of your nils   * You are unable to focus and your thoughts wander   * You may yawn more often than normal   * You have difficulty keeping your eyes open / nodding off   * Missing traffic signs, speeding, or tailgating  Prevention-   Good sleep hygiene, lifestyle and behavioral choices have the most impact on drowsy driving. There is no substitute for sleep and the average person requires 8 hours nightly. If you find yourself driving drowsy, stop and sleep. Consider the sleep hygiene tips provided during your visit as well. Medication Refill Policy: Refills for all medications require 1 week advance notice. Please have your pharmacy fax a refill request. We are unable to fax, or call in \"controled substance\" medications and you will need to pick these prescriptions up from our office. WeGoOut Activation    Thank you for requesting access to We[employer]Out. Please follow the instructions below to securely access and download your online medical record. WeGoOut allows you to send messages to your doctor, view your test results, renew your prescriptions, schedule appointments, and more. How Do I Sign Up? 1. In your internet browser, go to [email or web address]. 2. Click on the First Time User? Click Here link in the Sign In box. You will see the New Member Sign Up page. 3. Enter your WeGoOut Access Code exactly as it appears below. You will not need to use this code after youve completed the sign-up process. If you do not sign up before the expiration date, you must request a new code.     WeGoOut Access Code: [email or web address]  Expires: 2019  9:48 AM (This is the date your [employer]) access code will )    4. Enter the last four digits of your Social Security Number (xxxx) and Date of Birth (mm/dd/yyyy) as indicated and click Submit. You will be taken to the next sign-up page. 5. Create a [employer] ([facility]) ID. This will be your [employer] ([facility]) login ID and cannot be changed, so think of one that is secure and easy to remember. 6. Create a [employer] ([facility]) password. You can change your password at any time. 7. Enter your Password Reset Question and Answer. This can be used at a later time if you forget your password. 8. Enter your e-mail address. You will receive e-mail notification when new information is available in [address] 9. Click Sign Up. You can now view and download portions of your medical record. 10. Click the Download Summary menu link to download a portable copy of your medical information. Additional Information    If you have questions, please call [phone]. Remember, [employer] ([facility]) is NOT to be used for urgent needs. For medical emergencies, dial 911.

## 2019-09-16 ENCOUNTER — OFFICE VISIT (OUTPATIENT)
Dept: INTERNAL MEDICINE CLINIC | Age: 29
End: 2019-09-16

## 2019-09-16 VITALS
TEMPERATURE: 97.9 F | WEIGHT: 253 LBS | HEIGHT: 62 IN | BODY MASS INDEX: 46.56 KG/M2 | HEART RATE: 94 BPM | OXYGEN SATURATION: 98 % | RESPIRATION RATE: 16 BRPM | SYSTOLIC BLOOD PRESSURE: 122 MMHG | DIASTOLIC BLOOD PRESSURE: 62 MMHG

## 2019-09-16 DIAGNOSIS — E66.01 OBESITY, MORBID (HCC): ICD-10-CM

## 2019-09-16 DIAGNOSIS — R93.1 ABNORMAL ECHOCARDIOGRAM: ICD-10-CM

## 2019-09-16 DIAGNOSIS — G47.33 OSA (OBSTRUCTIVE SLEEP APNEA): ICD-10-CM

## 2019-09-16 DIAGNOSIS — I49.9 CARDIAC ARRHYTHMIA, UNSPECIFIED CARDIAC ARRHYTHMIA TYPE: ICD-10-CM

## 2019-09-16 DIAGNOSIS — I26.99 PE (PULMONARY THROMBOEMBOLISM) (HCC): Primary | ICD-10-CM

## 2019-09-16 DIAGNOSIS — I82.432 ACUTE DEEP VEIN THROMBOSIS (DVT) OF POPLITEAL VEIN OF LEFT LOWER EXTREMITY (HCC): ICD-10-CM

## 2019-09-16 NOTE — PROGRESS NOTES
CC:  Results     HPI:    Elise Mari is a 25 y.o. female who presents with a chief complaint of Results   and:    YAEL  - saw sleep center, getting CPAP tomorrow; they will monitor CPAP use x 30 days, complete paperwork for job. - notes rev'd; will set up APAP    PULMONARY EMBOLISM  - SOB: no, RICHTER: breathless after 1 flight, usual, Wheezing: no, Hemoptysis: no.  - Calf/Thigh swelling/pain: no.  - Palpitations/tachycardia: no, Chest pain: no.   - Bleeding/Bruising: no, Epistaxis: no, Gingival bleeding: no, Hematemesis: no, Hematochezia: no, Melena: no.  - Taking med: yes, as directed but varying times of day  - Working:  job pending APAP compliance    ARRHYTHMIA  - asxic; cardiology appt this wk    MORBID OBESITY  - no specific plan    Allergies   Allergen Reactions    Shellfish Derived Swelling     Current Outpatient Medications on File Prior to Visit   Medication Sig Dispense Refill    rivaroxaban (XARELTO) 20 mg tab tablet Take  by mouth daily.  famotidine (PEPCID) 40 mg tablet Take 1 Tab by mouth nightly. 30 Tab 3     No current facility-administered medications on file prior to visit. ASSESSMENT  TREATMENT  PLAN:    1. PE (pulmonary thromboembolism) ([address]  2. Acute deep vein thrombosis (DVT) of popliteal & peroneal vein of left lower extremity (HCC)  Improving  - contin xarelto, same time daily    3. Cardiac arrhythmia, unspecified cardiac arrhythmia type  4. Abnormal echocardiogram  Asxic  - CARDIAC HOLTER MONITOR; Future  - cardiac f/u this wk    5. YAEL (obstructive sleep apnea)  Stable  - APAP per Sleep Center  - importance of compliance reviewed, understood    6. Obesity, morbid ([address]  Unchanged  - reviewed will start specific plan next OV     Patient Instructions   · Take your blood thinner medicine Every day, Exactly as directed. ·   DONE!        PHYSICAL EXAM:    CONSTITUTIONAL:     Vitals:    09/16/19 1059   BP: 122/62   Pulse: 94   Resp: 16   Temp: 97.9 °F (36.6 °C) TempSrc: Oral   SpO2: 98%   Weight: 253 lb (114.8 kg)   Height: 5' 2\" (1.575 m)   PainSc:   0 - No pain     Weight Metrics 9/16/2019 9/6/2019 8/26/2019 8/12/2019 7/16/2019 7/2/2019 7/2/2019   Weight 253 lb 252 lb 254 lb 8 oz 250 lb 250 lb 236 lb 236 lb   BMI 46.27 kg/m2 39.47 kg/m2 39.86 kg/m2 39.16 kg/m2 40.35 kg/m2 36.96 kg/m2 38.09 kg/m2     WD obese appropriately groomed black female in NAD. RESPIRATORY:  POSITIVE findings:  none. Except for Positive findings listed, this system was WNL. My WNL exam:  Effort WNL; no intercostal retractions or accessory muscle use; diaphragmatic movement WNL.  No dullness, flatness or hyperresonance.  Breath sounds WNL; no adventitious sounds including no rales, wheezes, rhonchi or rubs. CARDIOVASCULAR:  POSITIVE findings:  PMI non-palpable. Except for Positive findings listed, this system was WNL. My WNL exam:  Heart :  w/o thrills.  PMI non displaced & WNL.  S1, S2 regular rate, normal rhythm.  No murmurs, gallops, clicks or rubs.  Extremities w/o edema or tenderness. SKIN & SUBCUTANEOUS TISSUE:  POSITIVE findings:  none. EXCEPT FOR POSITIVE findings listed, this system was WNL. My WNL exam:  No bruises, bleeding petechiae, rashes, lesions, ulcers.  No induration, subcutaneous nodules, tightening. HISTORY- ROS  PAST  FAMILY  SURGICAL  SOCIAL with PROBLEM LIST:  ROS from first OV reviewed & updated as necessary. [facility] reviewed & updated as necessary.     Active Ambulatory Problems     Diagnosis Date Noted    Pulmonary emboli ([address] 06/12/2019    PE (pulmonary thromboembolism) ([address] 06/12/2019    Obesity, morbid ([address] 06/27/2019    Ovarian cyst, right 82/91/0527    Supraumbilical hernia 13/78/7027    Scoliosis of thoracic spine 07/02/2019    Tachycardia 08/27/2019    YAEL (obstructive sleep apnea) 08/27/2019    DVT (deep venous thrombosis) ([address] 09/17/2019     Resolved Ambulatory Problems     Diagnosis Date Noted    Right foot pain 08/27/2019    Pain of right scapula 08/27/2019     Past Medical History:   Diagnosis Date    Cancer [facility]     Hypertension     Ill-defined condition     Ill-defined condition      No past surgical history on file. Social History     Tobacco Use    Smoking status: Former Smoker     Types: Cigars    Smokeless tobacco: Former User   Substance Use Topics    Alcohol use: Yes     Alcohol/week: 3.0 standard drinks     Types: 3 Shots of liquor per week     Frequency: 2-3 times a week     Drinks per session: 1 or 2     Comment: occ     Family History   Problem Relation Age of Onset    Kidney Disease Mother     Cancer Father         RESULTS of TESTS ORDERED - This Visit Only (some tests not available at time of visit):  No results found for this visit on 09/16/19.

## 2019-09-16 NOTE — PROGRESS NOTES
Chief Complaint   Patient presents with    Results     1. Have you been to the ER, urgent care clinic since your last visit? Hospitalized since your last visit? No    2. Have you seen or consulted any other health care providers outside of the [address] [place] [address] since your last visit? Include any pap smears or colon screening.  No

## 2019-09-17 PROBLEM — M89.8X1 PAIN OF RIGHT SCAPULA: Status: RESOLVED | Noted: 2019-08-27 | Resolved: 2019-09-17

## 2019-09-17 PROBLEM — I82.409 DVT (DEEP VENOUS THROMBOSIS) (HCC): Status: ACTIVE | Noted: 2019-09-17

## 2019-09-17 PROBLEM — M79.671 RIGHT FOOT PAIN: Status: RESOLVED | Noted: 2019-08-27 | Resolved: 2019-09-17

## 2019-10-01 ENCOUNTER — HOSPITAL ENCOUNTER (OUTPATIENT)
Dept: NON INVASIVE DIAGNOSTICS | Age: 29
Discharge: HOME | End: 2019-10-01
Attending: INTERNAL MEDICINE
Payer: COMMERCIAL

## 2019-10-01 DIAGNOSIS — R93.1 ABNORMAL ECHOCARDIOGRAM: ICD-10-CM

## 2019-10-01 DIAGNOSIS — I49.9 CARDIAC ARRHYTHMIA, UNSPECIFIED CARDIAC ARRHYTHMIA TYPE: ICD-10-CM

## 2019-10-01 PROCEDURE — 93225 XTRNL ECG REC<48 HRS REC: CPT

## 2019-10-01 NOTE — PROGRESS NOTES
24 hour monitor placed after order review per Dr Marybeth Sullivan. To be read by Dr Jeimy Chandler. Noting do not get box wet, if life threatening event occurs , call 911 and go to nearest er. Noted to remove tomorrow at 0930 and return before three thirty.  Extra supplies given

## 2019-11-01 ENCOUNTER — OFFICE VISIT (OUTPATIENT)
Dept: SLEEP MEDICINE | Age: 29
End: 2019-11-01

## 2019-11-01 VITALS
SYSTOLIC BLOOD PRESSURE: 134 MMHG | OXYGEN SATURATION: 98 % | HEART RATE: 94 BPM | WEIGHT: 258 LBS | HEIGHT: 67 IN | DIASTOLIC BLOOD PRESSURE: 89 MMHG | BODY MASS INDEX: 40.49 KG/M2

## 2019-11-01 DIAGNOSIS — G47.33 OSA (OBSTRUCTIVE SLEEP APNEA): Primary | ICD-10-CM

## 2019-11-01 NOTE — PATIENT INSTRUCTIONS
[address] [facility] [address] [place]. [place], [address]  Tel.  [phone]  Fax. 100 [facility] 60  [place], [address] [facility]  Tel.  [phone]  Fax. [phone] [address]  Tel.  [phone]  Fax. [phone]     Learning About CPAP for Sleep Apnea  What is CPAP? CPAP is a small machine that you use at home every night while you sleep. It increases air pressure in your throat to keep your airway open. When you have sleep apnea, this can help you sleep better so you feel much better. CPAP stands for \"continuous positive airway pressure. \"  The CPAP machine will have one of the following:  · A mask that covers your nose and mouth  · Prongs that fit into your nose  · A mask that covers your nose only, the most common type. This type is called NCPAP. The N stands for \"nasal.\"  Why is it done? CPAP is usually the best treatment for obstructive sleep apnea. It is the first treatment choice and the most widely used. Your doctor may suggest CPAP if you have:  · Moderate to severe sleep apnea. · Sleep apnea and coronary artery disease (CAD) or heart failure. How does it help? · CPAP can help you have more normal sleep, so you feel less sleepy and more alert during the daytime. · CPAP may help keep heart failure or other heart problems from getting worse. · NCPAP may help lower your blood pressure. · If you use CPAP, your bed partner may also sleep better because you are not snoring or restless. What are the side effects? Some people who use CPAP have:  · A dry or stuffy nose and a sore throat. · Irritated skin on the face. · Sore eyes. · Bloating. If you have any of these problems, work with your doctor to fix them. Here are some things you can try:  · Be sure the mask or nasal prongs fit well. · See if your doctor can adjust the pressure of your CPAP. · If your nose is dry, try a humidifier.   · If your nose is runny or stuffy, try decongestant medicine or a steroid nasal spray. If these things do not help, you might try a different type of machine. Some machines have air pressure that adjusts on its own. Others have air pressures that are different when you breathe in than when you breathe out. This may reduce discomfort caused by too much pressure in your nose. Where can you learn more? Go to Bee[employer]iveID.be  Enter Ney Sood in the search box to learn more about \"Learning About CPAP for Sleep Apnea. \"   © 8366-1100 Healthwise, Incorporated. Care instructions adapted under license by [facility] (which disclaims liability or warranty for this information). This care instruction is for use with your licensed healthcare professional. If you have questions about a medical condition or this instruction, always ask your healthcare professional. [address] any warranty or liability for your use of this information. Content Version: 2.2.36433; Last Revised: January 11, 2010  PROPER SLEEP HYGIENE    What to avoid  · Do not have drinks with caffeine, such as coffee or black tea, for 8 hours before bed. · Do not smoke or use other types of tobacco near bedtime. Nicotine is a stimulant and can keep you awake. · Avoid drinking alcohol late in the evening, because it can cause you to wake in the middle of the night. · Do not eat a big meal close to bedtime. If you are hungry, eat a light snack. · Do not drink a lot of water close to bedtime, because the need to urinate may wake you up during the night. · Do not read or watch TV in bed. Use the bed only for sleeping and sexual activity. What to try  · Go to bed at the same time every night, and wake up at the same time every morning. Do not take naps during the day. · Keep your bedroom quiet, dark, and cool. · Get regular exercise, but not within 3 to 4 hours of your bedtime. .  · Sleep on a comfortable pillow and mattress.   · If watching the clock makes you anxious, turn it facing away from you so you cannot see the time. · If you worry when you lie down, start a worry book. Well before bedtime, write down your worries, and then set the book and your concerns aside. · Try meditation or other relaxation techniques before you go to bed. · If you cannot fall asleep, get up and go to another room until you feel sleepy. Do something relaxing. Repeat your bedtime routine before you go to bed again. · Make your house quiet and calm about an hour before bedtime. Turn down the lights, turn off the TV, log off the computer, and turn down the volume on music. This can help you relax after a busy day. Drowsy Driving: The [facility] cites drowsiness as a causing factor in more than 026,600 police reported crashes annually, resulting in 76,000 injuries and 1,500 deaths. Other surveys suggest 55% of people polled have driven while drowsy in the past year, 23% had fallen asleep but not crashed, 3% crashed, and 2% had and accident due to drowsy driving. Who is at risk? Young Drivers: One study of drowsy driving accidents states that 55% of the drivers were under 25 years. Of those, 75% were male. Shift Workers and Travelers: People who work overnight or travel across time zones frequently are at higher risk of experiencing Circadian Rhythm Disorders. They are trying to work and function when their body is programed to sleep. Sleep Deprived: Lack of sleep has a serious impact on your ability to pay attention or focus on a task. Consistently getting less than the average of 8 hours your body needs creates partial or cumulative sleep deprivation. Untreated Sleep Disorders: Sleep Apnea, Narcolepsy, R.L.S., and other sleep disorders (untreated) prevent a person from getting enough restful sleep. This leads to excessive daytime sleepiness and increases the risk for drowsy driving accidents by up to 7 times.   Medications / Alcohol: Even over the counter medications can cause drowsiness. Medications that impair a drivers attention should have a warning label. Alcohol naturally makes you sleepy and on its own can cause accidents. Combined with excessive drowsiness its effects are amplified. Signs of Drowsy Driving:   * You don't remember driving the last few miles   * You may drift out of your nils   * You are unable to focus and your thoughts wander   * You may yawn more often than normal   * You have difficulty keeping your eyes open / nodding off   * Missing traffic signs, speeding, or tailgating  Prevention-   Good sleep hygiene, lifestyle and behavioral choices have the most impact on drowsy driving. There is no substitute for sleep and the average person requires 8 hours nightly. If you find yourself driving drowsy, stop and sleep. Consider the sleep hygiene tips provided during your visit as well. Medication Refill Policy: Refills for all medications require 1 week advance notice. Please have your pharmacy fax a refill request. We are unable to fax, or call in \"controled substance\" medications and you will need to pick these prescriptions up from our office. [employer] Activation    Thank you for requesting access to [employer]. Please follow the instructions below to securely access and download your online medical record. [employer] allows you to send messages to your doctor, view your test results, renew your prescriptions, schedule appointments, and more. How Do I Sign Up? 1. In your internet browser, go to [email or web address]. 2. Click on the First Time User? Click Here link in the Sign In box. You will see the New Member Sign Up page. 3. Enter your [employer] Access Code exactly as it appears below. You will not need to use this code after youve completed the sign-up process. If you do not sign up before the expiration date, you must request a new code.     [employer] Access Code: [email or web address]  Expires: 11/10/2019  2:36 PM (This is the date your Giggle access code will )    4. Enter the last four digits of your Social Security Number (xxxx) and Date of Birth (mm/dd/yyyy) as indicated and click Submit. You will be taken to the next sign-up page. 5. Create a Giggle ID. This will be your Giggle login ID and cannot be changed, so think of one that is secure and easy to remember. 6. Create a Giggle password. You can change your password at any time. 7. Enter your Password Reset Question and Answer. This can be used at a later time if you forget your password. 8. Enter your e-mail address. You will receive e-mail notification when new information is available in [address] 9. Click Sign Up. You can now view and download portions of your medical record. 10. Click the Download Summary menu link to download a portable copy of your medical information. Additional Information    If you have questions, please call [phone]. Remember, Giggle is NOT to be used for urgent needs. For medical emergencies, dial 911.

## 2019-11-01 NOTE — PROGRESS NOTES
[address] [facility]. [place], [address]   Tel.  [phone]   Fax. [facility]   [place], [address] [facility]   Tel.  [phone]   Fax. [phone] [address]   Tel.  [phone]   Fax. [phone]       Subjective:   Geovanna Weller is a 30 y.o. female seen for a positive airway pressure follow-up, last seen by me on 9/6/2019, previously  seen by Dr. Yoselyn Dickson on 6/23/2017, prior notes reviewed in detail. In lab sleep test 8/2017 showed AHI of 8.1/hr with a lowest SaO2 of 89%. She was started on APAP 4-20 cmH2O. She  is here today for 1st adherence and DOT compliance data. She reports no problems using the device. The following concerns reviewed:    Drowsiness no Problems exhaling no   Snoring no Forget to put on no   Mask Comfortable yes Can't fall asleep no   Dry Mouth no Mask falls off no   Air Leaking no Frequent awakenings no       She admits that her sleep has improved on PAP therapy using nasal pillows mask and heated tubing. Review of device download indicated:  Auto pressure: 4-20 cmH2O; Max Pressure: 11.7 cmH2O;  95th percentile Pressure: 10.4 cmH2O   95th Percentile Leak: 10.6 L/Min     % Used Days >= 4 hours: 87. Therapy Apnea Index averaged over PAP use: 0.6 /hr which reflects improved sleep breathing condition. [place] Sleepiness Score: 6 and Modified F.O.S.Q. Score Total / 2: 18.5 which reflects improved sleep quality over therapy time. Allergies   Allergen Reactions    Shellfish Derived Swelling       She has a current medication list which includes the following prescription(s): rivaroxaban and famotidine. .      She  has a past medical history of Cancer ([address] Hypertension, Ill-defined condition, and Ill-defined condition. Sleep Review of Systems: notable for Negative difficulty falling asleep;  Negative awakenings at night; Negative early morning headaches; Negative memory problems; Negative concentration issues; Negative chest pain; Negative shortness of breath; Negative significant joint pain at night; Negative significant muscle pain at night; Negative rashes or itching; Negative heartburn; Negative significant mood issues; 7 afternoon naps per week    Objective:     Visit Vitals  /89 (BP 1 Location: Left arm, BP Patient Position: Sitting)   Pulse 94   Ht 5' 7\" (1.702 m)   Wt 258 lb (117 kg)   SpO2 98%   BMI 40.41 kg/m²          General:   Alert, oriented, not in acute distress   Eyes:  Anicteric Sclerae; no obvious strabismus   Nose:  No obvious nasal septum deviation    Oropharynx:   Mallampati score 4, thick tongue base, uvula not seen due to low-lying soft palate, narrow tonsilo-pharyngeal pilars   Neck:   Midline trachea   Chest/Lungs:  Symmetrical lung expansion, clear lung fields on auscultation    CVS:  Normal rate, regular rhythm,  no JVD   Extremities:  No obvious rashes, no edema    Neuro:  No focal deficits; No obvious tremor    Psych:  Normal affect,  normal countenance       Assessment:       ICD-10-CM ICD-9-CM    1. YAEL (obstructive sleep apnea) G47.33 327.23    2. Adult BMI 40.0-44.9 kg/sq m ([facility]) Z68.41 V85.41        AHI = 8.1(8/2017). On APAP :  4-20 cmH2O. She is adherent with PAP therapy and PAP continues to benefit patient and remains necessary for control of her sleep apnea. Plan:     Follow-up and Dispositions    · Return in about 1 year (around 11/1/2020). *  Continue on current pressures    * Counseling was provided regarding the importance of regular PAP use with emphasis on ensuring sufficient total sleep time, proper sleep hygiene, and safe driving. * Re-enforced proper and regular cleaning for the device. * She was asked to contact our office for any problems regarding PAP therapy.     2. Recommended a dedicated weight loss program through appropriate diet and exercise regimen as significant weight reduction has been shown to reduce severity of obstructive sleep apnea. Patient's phone number [phone] (home)  was reviewed and confirmed for accuracy. She gives permission for messages regarding results and appointments to be left at that number. JESUS Cross-BC, [address] [place] [address]  Electronically signed.  11/01/19

## 2019-12-03 PROBLEM — F41.9 ANXIETY: Status: ACTIVE | Noted: 2019-12-03

## 2019-12-03 NOTE — PROGRESS NOTES
PCP: Latrelle Dakin, MD   Next appt This Dept:  7/2/2019    Next appt This Provider: 7/2/2019     CC:  Recent Hospital discharge for Hospital Course: Acute bilateral pulmonary emboli and Left lower extremity deep venous thrombosis who presents for Transitions Of Care     HPI:    Elena Bashir is a 30 y.o. female who presents with a chief complaint of Transitions Of Care for recent PE and DVT. She was first seen in the ED on 6/12/19 and subsequently admitted per this excerpt from the record:    Patient came to ED with left leg edema and pain since last 8 days. She also had mild shortness of breath. She denied any chest pain nausea vomiting diarrhea dysuria headache fever or chills etc. She never had similar symptoms in the past.She uses contraceptive ring since last 9 years intermittently. Her last ring insertion was before 2 weeks. occasional tobacco smoker. Denies long drives or hx blood clots in family. USG-Acute non-occlusive thrombus present in the left popliteal vein. Acute non-occlusive thrombus present in the left peroneal vein.   CTA-Acute pulmonary bilateral emboli. Recent Hospital discharge [6/14/19] for Hospital Course:   · Acute bilateral pulmonary emboli with right main pulmonary artery emboli extending into the lobar and subsegmental branches. · Left lower extremity deep venous thrombosis extending into popliteal and peroneal vein  · Leukocytosis  · Anemia normocytic  · Intermittent use of alcohol and cigarettes  - Stop  heparin infusion, xarelto will be covered by insurance. - Will need 6 months of treatment. CTA CHEST 6/12/19  PULMONARY ARTERIES:There are segmental and subsegmental emboli in the posterior  and lateral left lower lobe branches. There is a larger embolus in the right  main pulmonary artery, extending into the lobar and segmental branches of the  right lower lobe, right upper lobe.     Left Lower Venous  6/12/19:  · Acute non-occlusive thrombus present in the left popliteal vein and left peroneal vein. · Popliteal and peroneal vein(s) shows signs of reduced compressibility during transverse, 2D imaging. · Common femoral, greater saphenous, saphenous femoral junction, proximal femoral, mid femoral, distal femoral, popliteal, posterior tibial and peroneal vein(s) were imaged in the transverse and longitudinal planes. The vessels showed normal color filling and compressibility. Doppler interrogation of the veins showed phasic and spontaneous flow. ECHO 6/13/19:  · Left Ventricle: Mild concentric hypertrophy. No regional wall motion abnormality noted. Normal left ventricular strain. Mild (grade 1) left ventricular diastolic dysfunction. No ventricular septal defect present in the left ventricle. · Left Atrium: Mildly dilated left atrium. · Tricuspid Valve: Mild tricuspid valve regurgitation is present. There is mild concentric LVH with extensive endocardial fibrosis suggesting hypertensive heart disease. There is no significant primary valve pathology, pulmonary artery pressure is normal.  There are no indications of focal ischemia. Left Ventricle Normal cavity size and systolic function (ejection fraction normal). Mild concentric hypertrophy. Left ventricular speckled myocardium observed. The muscle mass is mildly increased. The cavity shape is normal. Normal septal motion. No regional wall motion abnormality noted. End-systolic volume is normal. Normal left ventricular strain. Strain analysis is not available in our present system There is mild (grade 1) left ventricular diastolic dysfunction. Elevated left ventricular diastolic pressure. End-diastolic volume is normal. The findings are consistent with hypertrophic cardiomyopathy without obstruction. No ventricular septal defect. No ventricular assist device is present. No left ventricular non-compaction. No spontaneous echo contrast There is no thrombus. There is no mass.    The LV shows a pattern of mild concentric hypertrophy. Systolic function is within normal limits. Diastolic function does not appear to be significantly impaired. Nonetheless, the endocardium is quite bright and a thick layer in the posterolateral wall and a somewhat thinner layer in the anteroseptal wall. All portions of the myocardium foot and briskly. The findings are consistent with most likely endocardial fibrosis. Right Ventricle Normal cavity size, wall thickness and global systolic function. There is no thrombus. No mass is present. Today, Ms. Parish Hernandes presents almost 2 wks after discharge feeling improved with no bleeding, bruising, petechiae and not chest or left leg pain and minimal left leg swelling. She does however have fatigue and RICHTER, 1 flight of steps, which began ~ 2 mos prior to recent PE dx. Her PMH is significant for:  · Scoliosis, w/ chronic waxing & waning back pain, dx'd age 13  · Anemia: not taking iron pills due to constipation  · ? Cervical cancer found on uterine scraping per pt  · ?  MS after first daughter born 15  Yrs ago    Her ROS is significant for:  - Right shoulder pain x 1 month;   - bilateral hip pain x yrs, worse after sitting for long periods;  - headaches in left temple, from left posterior to temple, not frontal, aggravated by sunlight;   - left ear pain preceded by heart beat in left ear  - anxiety  EXCEPT FOR POSITIVES LISTED ABOVE, the Review of Systems below was negative or within normal limits  CONSTITUTIONAL:    Fever  chills  night sweats  fatigue  malaise  weakness  anorexia  wt loss or gain  EYES:   Vision loss, blurred, double  color blind  discharge  irritation  glasses/contacts  eye exam:___/___ /____  H&NENT:    Head trauma  deafness  tinnitus  vertigo  ear discharge or pain  rhinitis  sinusitis  nasal drainage or congestion  epistaxis  sore mouth / tongue / throat  tonsillitis  voice changes  hoarseness  bad teeth or gums  RESPIRATORY:   SOB  wheezing  cough  sputum hemoptysis  asthma / COPD  pneumonia  TB/TB exposure  pleuritis  CARDIOVASCULAR:   Chest pain  diaphoresis  RICHTER  tachycardia  palpitations  LE edema  orthopnea  PND  lightheaded  syncope  GASTROINTESTINAL:   Nausea  vomiting  constipation  diarrhea  abdominal pain  hematochezia  melena  hematemesis  dysphagia  indigestion  acid reflux/GERD  hepatitis  liver problems  jaundice  GENITOURINARY:    Frequency  dysuria  hematuria  urine odor  urethral/vaginal discharge  urgency  hesitancy  incomplete emptying  weak stream  SKINBREASTS:    Rash  itching  whelps  bruises  sores  breast lumps  MUSCULOSKELETAL:    Joint pain, stiffness, effusion, limited movement  muscle pain, weakness  back neck pain  fracture(s)  NEUROLOGICAL:  .  Headaches  migraines  dizzy, lightheaded  vertigo  seizure  memory loss  gait problems   HEMELYMPH:    Bruise, bleed easily  bleeding gums  lymphadenopathy  PSYCHIATRIC:    Anxiety  depression  suicidal or homicidal thoughts  mood swings  ENDOCRINE:    Polyuria  polydipsia  polyphagia  heat or cold intolerance  goiter  thyroid problems    Allergies   Allergen Reactions    Shellfish Derived Swelling     Current Outpatient Medications on File Prior to Visit   Medication Sig Dispense Refill    famotidine (PEPCID) 40 mg tablet Take 1 Tab by mouth nightly. 30 Tab 3    rivaroxaban (XARELTO) 20 mg tab table Sig - Route: Take  by mouth daily. - Oral       No current facility-administered medications on file prior to visit. ASSESSMENT  TREATMENT  PLAN:    1. Acute bilateral pulmonary embolism, without acute cor pulmonale present ([address]  2. Acute DVT, left popliteal & left peroneal veins  Improved  - contin Xarelto x 6 months    3. Long term (current) use of anticoagulants  No complications noted  - CBC W/O DIFF  - METABOLIC PANEL, COMPREHENSIVE    4. Anxiety  5. Sweaty palms  Uncontrolled.  R/o medical origin  - T4, FREE  - T3 TOTAL  - TSH 3RD GENERATION    6. Leukocytosis  7. Anemia  Likely reactive and iron deficiency combo  - CBC W/O DIFF  - eval if persists    8. Obesity, morbid ([address]  Uncontrolled  - Plate Diet; increase activity    9. Scoliosis of thoracic spine, unspecified scoliosis type  Unchanged  - consider tx/PT prn    Patient Instructions     Perscription for Obesity  Follow \"Plate\" diet:  Learning About How to Prevent Blood Clots  Tips to Prevent Blood Clots: After Your Visit   Rivaroxaban (Xarelto, Xarelto Starter Pack) - (By mouth)       PHYSICAL EXAM:    CONSTITUTIONAL:     Vitals:    06/27/19 1220   BP: 110/64   Pulse: (!) 110   Resp: 19   Temp: 98.3 °F (36.8 °C)   TempSrc: Oral   SpO2: 98%   Weight: 249 lb (112.9 kg)   Height: 5' 6\" (1.676 m)   PainSc:   0 - No pain   LMP: 06/26/2019     Weight Metrics 11/1/2019 9/16/2019 9/6/2019 8/26/2019 8/12/2019 7/16/2019 7/2/2019   Weight 258 lb 253 lb 252 lb 254 lb 8 oz 250 lb 250 lb 236 lb   BMI 40.41 kg/m2 46.27 kg/m2 39.47 kg/m2 39.86 kg/m2 39.16 kg/m2 40.35 kg/m2 36.96 kg/m2     General:  WD morbidly obese appropriately groomed black female in NAD. EYES:   POSITIVE:  none. Except for Positive findings listed, this system was WNL. My WNL exam:   Conjunctivae & lids w/o erythema or discharge.  PERRL; Iris w/o visible vessels, deposits. EARS, NOSE, MOUTH & THROAT:   POSITIVE:  Serous fluid behind left TM, right TM normal. Except for Positive findings listed, this system was WNL. My WNL exam:  External ears & nose WNL w/o scars, lesions or masses.  Lips WNL.  Otoscope: External auditory canals & tympanic membranes WNL; Wax WNL. HEAD & NECK:   POSITIVE:  none. Except for Positive findings listed, this system was WNL. My WNL exam:  Atraumatic, normocephalic. Symmetrical w/o masses, tenderness, tracheal deviation, crepitus.  Thyroid w/o enlargement, tenderness, mass. RESPIRATORY:   POSITIVE:  none. Except for Positive findings listed, this system was WNL.   My WNL exam: Effort WNL; no intercostal retractions or accessory muscle use; diaphragmatic movement WNL.  Breath sounds: good air entry, no adventitious sounds; no rales, wheezes, rhonchi or rubs.  No dullness, flatness or hyperresonance. CARDIOVASCULAR:  POSITIVE:  PMI nonpalpable. Increased P2. Mild nontender edema left leg. Except for Positive findings listed, this system was WNL. My WNL exam:   Heart - w/o thrills. PMI non-displaced.  S1, S2 normal rate, regular rhythm. No murmurs, gallops, clicks or rubs. Vascular  carotid pulses WNL; no bruits  abdominal aorta size WNL; no bruits  pedal pulses WNL  Extremities negative for edema or varicosities. GASTROINTESTINAL (Abdomen):   POSITIVE:  Obese. Except for Positive findings listed, this system was WNL. My WNL exam:  Flat; NABS w/o masses or tenderness  Liver 6 cm in RMCL. Liver & spleen w/o organomegaly.  No hernias palpable. GENITOURINARY (Urinary Only):  POSITIVE:  none. Except for Positive findings listed, this system was WNL. My WNL exam:   No CVA or suprapubic tenderness. LYMPHATIC ( Neck):   POSITIVE:  none. Except for Positive findings listed, this system was WNL. My WNL exam: Lymph nodes: Palpation WNL w/o enlargement, tenderness or fixation. MUSCULOSKELETAL:   POSITIVE: none. Except for Positive findings listed, this system was WNL. My WNL exam:    Gait & station WNL. Joints, Bones and Muscles of   Head & Neck:  Spine, Ribs and Pelvis (rom not done):  RUE:  LUE:  RLE:  LLE:   - No misalignment, asymmetry, crepitation, defects, tenderness, masses or effusions.   - ROM full w/o pain, crepitation or contracture. - Joints stable w/o dislocation (luxation), subluxation or laxity.  - Muscle strength 5/5, tone WNL w/o flaccidity, cog wheel or spasticity. No atrophy or abnormal movements. SKIN & SUBCUTANEOUS TISSUE:  POSITIVE:  No bruises or petechiae. Except for Positive findings listed, this system was WNL.    My WNL exam: No rashes, lesions, ulcers.  No induration, subcutaneous nodules, tightening. NEUROLOGIC:   POSITIVE:  none. Except for Positive findings listed, this system was WNL. My WNL exam:  Cranial nerves 2-12 intact.  DTRs: Biceps, patellar, Achilles symmetrical and WNL. Babinski negative bilaterally. PSYCHIATRIC:   POSITIVE:  Mild anxiety w/o agitation or pressured speech. Except for Positive findings listed, this system was WNL. My WNL exam: Judgment & insight WNL.  Awake, aware, alert, appropriate; affect & mood WNL w/o evidence of depression, anxiety, agitation, anger or aggression. Oriented to person, place & time. Recent & remote memory of visit related issues WNL. LIZZETTE   LIZZETTE 7 6/27/2019   Over the past 2 weeks how often have you felt nervous, anxious, or on edge? 1   Over the past 2 weeks how often have you not been able to stop or control worrying? 0   In the past 2 weeks how often have you worried too much about different things? 1   Over the past 2 weeks, how often have you had trouble relaxing? 3   Over the last 2 weeks how often have you been so restless that it is hard to sit still? 2   Over the last 2 weeks how often have you been easily annoyed or irritable? 1   Over the last 2 weeks, how often have you felt afraid as if something awful might happen? 3   BSHSI AMB LIZZETTE-7 SCORE 11     PHQ   3 most recent PHQ Screens 6/27/2019   Little interest or pleasure in doing things Not at all   Feeling down, depressed, irritable, or hopeless Not at all   Total Score PHQ 2 0       HISTORY- ROS  PAST  FAMILY  SURGICAL  SOCIAL with PROBLEM LIST:  ROS above and per [facility]. [facility] reviewed & updated as necessary.     Active Ambulatory Problems     Diagnosis Date Noted    Pulmonary emboli ([address] 06/12/2019    PE (pulmonary thromboembolism) ([address] 06/12/2019    Obesity, morbid ([address] 06/27/2019    Ovarian cyst, right 04/36/2461    Supraumbilical hernia 96/66/9505    Scoliosis of thoracic spine 07/02/2019    Tachycardia 08/27/2019    YAEL (obstructive sleep apnea) 08/27/2019    DVT (deep venous thrombosis) ([facility] 09/17/2019     Resolved Ambulatory Problems     Diagnosis Date Noted    Right foot pain 08/27/2019    Pain of right scapula 08/27/2019     Past Medical History:   Diagnosis Date    Cancer ([facility]     Hypertension     Ill-defined condition     Ill-defined condition      No past surgical history on file. Social History     Tobacco Use    Smoking status: Former Smoker     Types: Cigars    Smokeless tobacco: Former User   Substance Use Topics    Alcohol use:  Yes     Alcohol/week: 3.0 standard drinks     Types: 3 Shots of liquor per week     Frequency: 2-3 times a week     Drinks per session: 1 or 2     Comment: occ     Family History   Problem Relation Age of Onset    Kidney Disease Mother     Cancer Father         RESULTS of TESTS ORDERED - This Visit Only (not all available at time of visit):  Results for orders placed or performed in visit on 06/27/19   T4, FREE   Result Value Ref Range    T4, Free 1.05 0.82 - 1.77 ng/dL    Narrative    Performed at:  [address]  [id number]  : Stephani Stoner MD, Phone:  [phone]   T3 TOTAL   Result Value Ref Range    T3, total 164 71 - 180 ng/dL    Narrative    Performed at:  [address]  [id number]  : Stephani Stoner MD, Phone:  [phone]   TSH 3RD GENERATION   Result Value Ref Range    TSH 0.758 0.450 - 4.500 uIU/mL    Narrative    Performed at:  [address]  [id number]  : Stephani Stoner MD, Phone:  [phone]   CBC W/O DIFF   Result Value Ref Range    WBC 12.3 (H) 3.4 - 10.8 x10E3/uL    RBC 3.61 (L) 3.77 - 5.28 x10E6/uL    HGB 8.6 (L) 11.1 - 15.9 g/dL    HCT 28.5 (L) 34.0 - 46.6 %    MCV 79 79 - 97 fL    MCH 23.8 (L) 26.6 - 33.0 pg    MCHC 30.2 (L) 31.5 - 35.7 g/dL    RDW 15.4 12.3 - 15.4 % PLATELET 604 309 - 889 x10E3/uL    Narrative    Performed at:  [address]  [id number]  : Karyn Paiz MD, Phone:  [phone]   METABOLIC PANEL, COMPREHENSIVE   Result Value Ref Range    Glucose 87 65 - 99 mg/dL    BUN 5 (L) 6 - 20 mg/dL    Creatinine 0.50 (L) 0.57 - 1.00 mg/dL    GFR est non- >59 mL/min/1.73    GFR est  >59 mL/min/1.73    BUN/Creatinine ratio 10 9 - 23    Sodium 139 134 - 144 mmol/L    Potassium 4.6 3.5 - 5.2 mmol/L    Chloride 101 96 - 106 mmol/L    CO2 25 20 - 29 mmol/L    Calcium 9.5 8.7 - 10.2 mg/dL    Protein, total 7.6 6.0 - 8.5 g/dL    Albumin 3.8 3.5 - 5.5 g/dL    GLOBULIN, TOTAL 3.8 1.5 - 4.5 g/dL    A-G Ratio 1.0 (L) 1.2 - 2.2    Bilirubin, total <0.2 0.0 - 1.2 mg/dL    Alk.  phosphatase 77 39 - 117 IU/L    AST (SGOT) 12 0 - 40 IU/L    ALT (SGPT) 8 0 - 32 IU/L    Narrative    Performed at:  [address]  [id number]  : Karyn Paiz MD, Phone:  [phone]

## 2019-12-03 NOTE — PROGRESS NOTES
CC:  Leg Pain (f/u from ED)     HPI:    Margaretha Libman is a 30 y.o. female , s/p recent bilateral PE and LLE DVT who, at last OV, presented w/ SOB and painful swelling of RLE and was sent to ED to r/o recurrent acute PE or DVT. She was evaluated in ED and underwent CTA abd and chest. She presents today with a chief complaint of f/u of  Leg Pain (f/u from ED)    and other problems:    PE  DVT  LEG PAIN  Per ED notes on 7/2/19 she was cont'd on her current tx and discharged:  Clinical Impression:   1. Hx of pulmonary embolus    2. Cyst of right ovary    3. Arthralgia of right lower leg    4. Atypical chest pain       CTA ABD & CTA CHEST  7/2/19: The lungs are clear of mass, nodule, airspace disease or edema. There is been partial lysis of the cast extending into the right upper pulmonary   artery (series 3, axial images 70 through 76). There has been complete lysis of   the pulmonary embolism in the left main pulmonary artery. There is no mediastinal or hilar adenopathy or mass. The aorta enhances normally   without evidence of aneurysm or dissection. The visualized portions of the upper abdominal organs are normal.       There is a fat-containing supraumbilical hernia (series 6, image 44). Anteverted uterus. Right ovarian 2.9 cm cyst.     Today pt feels improved w/ minimal SOB and decreased LE swelling w/o LE pain. She is pleased to hear that her lung clots are dramatically improved. RESULTS REVIEWED W/ PT  - Increased globulin, abnl CBC reviewed      Allergies   Allergen Reactions    Shellfish Derived Swelling     Current Outpatient Medications on File Prior to Visit   Medication Sig Dispense Refill    rivaroxaban (XARELTO) 20 mg tab tablet Take  by mouth daily.  famotidine (PEPCID) 40 mg tablet Take 1 Tab by mouth nightly. 30 Tab 3     No current facility-administered medications on file prior to visit. ASSESSMENT  TREATMENT  PLAN:    1.  Other acute pulmonary embolism without acute cor pulmonale (HCC)  2. Acute deep vein thrombosis (DVT) of other specified vein of left lower extremity (HCC)  Improving  - contin Xarelto    3. Anemia, unspecified type  4. Elevated serum globulin level  R/o:  - METABOLIC PANEL, COMPREHENSIVE  - PROTEIN ELECTROPHORESIS  - IMMUNOELECTROPHORESIS (IMMUNOFIX.)  - FREE LIGHT CHAINS, KAPPA/LAMBDA, QT    Patient Instructions   Return to get your blood work done Monday - Thursday , 8 am to 11:30 am. Tell the [employer] you are just here for the lab. Follow-up and Dispositions    · Return in about 2 weeks (around 7/30/2019). PHYSICAL EXAM:    CONSTITUTIONAL:     Vitals:    07/16/19 1352   BP: 118/76   Pulse: (!) 101   Resp: 20   Temp: 98.8 °F (37.1 °C)   TempSrc: Oral   SpO2: 98%   Weight: 250 lb (113.4 kg)   Height: 5' 6\" (1.676 m)   PainSc:   0 - No pain   LMP: 06/26/2019     Weight Metrics 7/16/2019 7/2/2019   Weight 250 lb 236 lb   BMI 40.35 kg/m2 36.96 kg/m2     General: WD morbidly obese  black female appropriately groomed in NAD. RESPIRATORY:  POSITIVE findings:  none. Except for Positive findings listed, this system was WNL. My WNL exam:  Effort WNL; no intercostal retractions or accessory muscle use; diaphragmatic movement WNL.  No dullness, flatness or hyperresonance.  Breath sounds WNL; no adventitious sounds including no rales, wheezes, rhonchi or rubs. CARDIOVASCULAR:  POSITIVE findings:  PMI non-palpable, Mild to minimal nontender edema LEs. Except for Positive findings listed, this system was WNL. My WNL exam:  Heart :  w/o thrills.  PMI non displaced & WNL.  S1, S2 regular rate, normal rhythm.  No murmurs, gallops, clicks or rubs.  Extremities w/o edema. HISTORY- ROS  PAST  FAMILY  SURGICAL  SOCIAL with PROBLEM LIST:  ROS from first OV reviewed & updated as necessary. [place] reviewed & updated as necessary.     Active Ambulatory Problems     Diagnosis Date Noted    Pulmonary emboli ([address] 06/12/2019    PE (pulmonary thromboembolism) ([facility] 06/12/2019    Obesity, morbid ([facility] 06/27/2019    Ovarian cyst, right 35/35/8814    Supraumbilical hernia 68/70/4285    Scoliosis of thoracic spine 07/02/2019    Tachycardia 08/27/2019    YAEL (obstructive sleep apnea) 08/27/2019    DVT (deep venous thrombosis) ([facility] 09/17/2019    Anxiety 12/03/2019     Resolved Ambulatory Problems     Diagnosis Date Noted    Right foot pain 08/27/2019    Pain of right scapula 08/27/2019     Past Medical History:   Diagnosis Date    Cancer ([facility]     Hypertension     Ill-defined condition     Ill-defined condition      History reviewed. No pertinent surgical history. Social History     Tobacco Use    Smoking status: Former Smoker     Types: Cigars    Smokeless tobacco: Former User   Substance Use Topics    Alcohol use: Yes     Alcohol/week: 3.0 standard drinks     Types: 3 Shots of liquor per week     Frequency: 2-3 times a week     Drinks per session: 1 or 2     Comment: occ     Family History   Problem Relation Age of Onset    Kidney Disease Mother     Cancer Father         RESULTS of TESTS ORDERED - This Visit Only (some tests not available at time of visit):  No results found for this visit on 07/16/19.

## 2020-02-10 PROBLEM — N85.4: Status: ACTIVE | Noted: 2020-02-10

## 2020-03-18 PROBLEM — D64.9 ANEMIA: Status: ACTIVE | Noted: 2020-03-18

## 2020-03-18 PROBLEM — D64.9 ANEMIA: Status: ACTIVE | Noted: 2019-06-14

## 2020-03-26 NOTE — PROGRESS NOTES
[address] [facility] [place]. [place], [address]   Tel.  [phone]   Fax. [address] [facility]   [place], [address] [facility]   Tel.  [phone]   Fax. [phone] [address]    Tel.  [phone]   Fax. [phone]       Subjective:   Kannan Bardales is a 30 y.o. female who was seen by synchronous (real-time) audio-video technology on 3/27/2020. Consent:  She and/or her healthcare decision maker is aware that this patient-initiated Telehealth encounter is a billable service, with coverage as determined by her insurance carrier. She is aware that she may receive a bill and has provided verbal consent to proceed: Yes    I was in the office while conducting this encounter. Patient verified with Photo ID in chart. Kannan Bardales is a 30 y.o. female seen for a positive airway pressure follow-up, last seen by me on 11/1/2019, previously  seen by Dr. Artis Pollack 6/23/2017, prior notes reviewed in detail.   In lab sleep test 8/2017 showed AHI of 8.1/hr with a lowest SaO2 of 89%. She was started on APAP 4-20 cmH2O. She  is here today for follow up. She reports no problems using the device. The following concerns reviewed:    Drowsiness no Problems exhaling no   Snoring no Forget to put on no   Mask Comfortable yes Can't fall asleep no   Dry Mouth no Mask falls off no   Air Leaking no Frequent awakenings no       She admits that her sleep has improved on PAP therapy using nasal pillows mask and heated tubing. Review of device download indicated:  Auto pressure: 4-20 cmH2O; Max Pressure: 13.1 cmH2O;  95th percentile Pressure: 11.3 cmH2O   95th Percentile Leak: 11.3 L/Min     % Used Days >= 4 hours: 77.  Avg hours used:  6:42. Therapy Apnea Index averaged over PAP use: 1.1 /hr which reflects improved sleep breathing condition.     Victoria Sleepiness Score: 2 and Modified F.O.S.Q. Score Total / 2: 20 which reflects improved sleep quality over therapy time. Allergies   Allergen Reactions    Shellfish Derived Swelling       She has a current medication list which includes the following prescription(s): famotidine and rivaroxaban. .      She  has a past medical history of Cancer ([address] Hypertension, Ill-defined condition, and Ill-defined condition. Sleep Review of Systems: notable for Negative difficulty falling asleep; Negative awakenings at night; Negative early morning headaches; Negative memory problems; Negative concentration issues; Negative chest pain; Negative shortness of breath; Negative significant joint pain at night; Negative significant muscle pain at night; Negative rashes or itching; Negative heartburn; Negative significant mood issues; 7 afternoon naps per week    Objective:   Vitals reported by patient to Medical Assistant    Visit Vitals  Temp 97.2 °F (36.2 °C)   Ht 5' 7\" (1.702 m)   Wt 258 lb (117 kg)   SpO2 100%   BMI 40.41 kg/m²          Physical Exam completed by visual and auditory observation of patient with verbal input from patient. General:   Alert, oriented, not in acute distress   Eyes:  Anicteric Sclerae; no obvious strabismus   Nose:  No obvious nasal septum deviation    Neck:   Midline trachea   Chest/Lungs:  Symmetrical lung expansion, no audible wheeze    CVS:  No JVD   Extremities:  No obvious rashes    Neuro:  No focal deficits; No obvious tremor    Psych:  Normal affect,  normal countenance       Assessment:       ICD-10-CM ICD-9-CM    1. YAEL (obstructive sleep apnea) G47.33 327.23 AMB SUPPLY ORDER   2. Adult BMI 40.0-44.9 kg/sq m (HCC) Z68.41 V85.41        AHI = 8.1(8/2017). On APAP :  4-20 cmH2O. She is adherent with PAP therapy and PAP continues to benefit patient and remains necessary for control of her sleep apnea. Plan:     Follow-up and Dispositions    · Return in about 1 year (around 3/27/2021).        *  Continue on current pressures    Orders Placed This Encounter    AMB SUPPLY ORDER Diagnosis: (G47.33) YAEL (obstructive sleep apnea)  (primary encounter diagnosis)     Replacement Supplies for Positive Airway Pressure Therapy Device:   Duration of need: 99 months.  Nasal Pillows (Replace) 2 per month.  Nasal Interface Mask 1 every 3 months.  Pos Airway pressure chin strap   Headgear 1 every 6 months.  Tubing with heating element 1 every 3 months.  Filter(s) Disposable 2 per month.  Filter(s) Non-Disposable 1 every 6 months. .   [address] for Humidifier (Replace) 1 every 6 months. ENRICO Abreu; NPI: [id number]    Electronically signed. Date:- 03/27/20     * Counseling was provided regarding the importance of regular PAP use with emphasis on ensuring sufficient total sleep time, proper sleep hygiene, and safe driving. * Re-enforced proper and regular cleaning for the device. * She was asked to contact our office for any problems regarding PAP therapy. 2. Recommended a dedicated weight loss program through appropriate diet and exercise regimen as significant weight reduction has been shown to reduce severity of obstructive sleep apnea. She is going to start walking more. Patient's phone number [phone] (home)  was reviewed and confirmed for accuracy. She gives permission for messages regarding results and appointments to be left at that number. Pursuant to the emergency declaration under the [facility], [facility] waiver authority and the [employer] General Act, this Virtual Visit was conducted, with patient's consent, to reduce the patient's risk of exposure to COVID-19 and provide continuity of care for an established patient. Services were provided through a video synchronous discussion virtually to substitute for in-person clinic visit. ENRICO Abreu, [address] [facility]  Electronically signed.  03/27/20

## 2020-03-27 ENCOUNTER — DOCUMENTATION ONLY (OUTPATIENT)
Dept: SLEEP MEDICINE | Age: 30
End: 2020-03-27

## 2020-03-27 ENCOUNTER — OFFICE VISIT (OUTPATIENT)
Dept: SLEEP MEDICINE | Age: 30
End: 2020-03-27

## 2020-03-27 VITALS — WEIGHT: 258 LBS | HEIGHT: 67 IN | OXYGEN SATURATION: 100 % | TEMPERATURE: 97.2 F | BODY MASS INDEX: 40.49 KG/M2

## 2020-03-27 DIAGNOSIS — G47.33 OSA (OBSTRUCTIVE SLEEP APNEA): Primary | ICD-10-CM

## 2020-03-27 NOTE — PATIENT INSTRUCTIONS
[address] [place]. [place], [address]  Tel.  [phone]  Fax. 100 [facility] 60  [place], [address] [facility]  Tel.  [phone]  Fax. [phone] [address]  Tel.  [phone]  Fax. [phone]     Learning About CPAP for Sleep Apnea  What is CPAP? CPAP is a small machine that you use at home every night while you sleep. It increases air pressure in your throat to keep your airway open. When you have sleep apnea, this can help you sleep better so you feel much better. CPAP stands for \"continuous positive airway pressure. \"  The CPAP machine will have one of the following:  · A mask that covers your nose and mouth  · Prongs that fit into your nose  · A mask that covers your nose only, the most common type. This type is called NCPAP. The N stands for \"nasal.\"  Why is it done? CPAP is usually the best treatment for obstructive sleep apnea. It is the first treatment choice and the most widely used. Your doctor may suggest CPAP if you have:  · Moderate to severe sleep apnea. · Sleep apnea and coronary artery disease (CAD) or heart failure. How does it help? · CPAP can help you have more normal sleep, so you feel less sleepy and more alert during the daytime. · CPAP may help keep heart failure or other heart problems from getting worse. · NCPAP may help lower your blood pressure. · If you use CPAP, your bed partner may also sleep better because you are not snoring or restless. What are the side effects? Some people who use CPAP have:  · A dry or stuffy nose and a sore throat. · Irritated skin on the face. · Sore eyes. · Bloating. If you have any of these problems, work with your doctor to fix them. Here are some things you can try:  · Be sure the mask or nasal prongs fit well. · See if your doctor can adjust the pressure of your CPAP. · If your nose is dry, try a humidifier.   · If your nose is runny or stuffy, try decongestant medicine or a steroid nasal spray. If these things do not help, you might try a different type of machine. Some machines have air pressure that adjusts on its own. Others have air pressures that are different when you breathe in than when you breathe out. This may reduce discomfort caused by too much pressure in your nose. Where can you learn more? Go to Expert.be  Enter Monse Sims in the search box to learn more about \"Learning About CPAP for Sleep Apnea. \"   © 6493-0926 Healthwise, Incorporated. Care instructions adapted under license by [employer] (which disclaims liability or warranty for this information). This care instruction is for use with your licensed healthcare professional. If you have questions about a medical condition or this instruction, always ask your healthcare professional. [address] any warranty or liability for your use of this information. Content Version: 6.2.56133; Last Revised: January 11, 2010  PROPER SLEEP HYGIENE    What to avoid  · Do not have drinks with caffeine, such as coffee or black tea, for 8 hours before bed. · Do not smoke or use other types of tobacco near bedtime. Nicotine is a stimulant and can keep you awake. · Avoid drinking alcohol late in the evening, because it can cause you to wake in the middle of the night. · Do not eat a big meal close to bedtime. If you are hungry, eat a light snack. · Do not drink a lot of water close to bedtime, because the need to urinate may wake you up during the night. · Do not read or watch TV in bed. Use the bed only for sleeping and sexual activity. What to try  · Go to bed at the same time every night, and wake up at the same time every morning. Do not take naps during the day. · Keep your bedroom quiet, dark, and cool. · Get regular exercise, but not within 3 to 4 hours of your bedtime. .  · Sleep on a comfortable pillow and mattress.   · If watching the clock makes you anxious, turn it facing away from you so you cannot see the time. · If you worry when you lie down, start a worry book. Well before bedtime, write down your worries, and then set the book and your concerns aside. · Try meditation or other relaxation techniques before you go to bed. · If you cannot fall asleep, get up and go to another room until you feel sleepy. Do something relaxing. Repeat your bedtime routine before you go to bed again. · Make your house quiet and calm about an hour before bedtime. Turn down the lights, turn off the TV, log off the computer, and turn down the volume on music. This can help you relax after a busy day. Drowsy Driving: The [facility] cites drowsiness as a causing factor in more than 554,396 police reported crashes annually, resulting in 76,000 injuries and 1,500 deaths. Other surveys suggest 55% of people polled have driven while drowsy in the past year, 23% had fallen asleep but not crashed, 3% crashed, and 2% had and accident due to drowsy driving. Who is at risk? Young Drivers: One study of drowsy driving accidents states that 55% of the drivers were under 25 years. Of those, 75% were male. Shift Workers and Travelers: People who work overnight or travel across time zones frequently are at higher risk of experiencing Circadian Rhythm Disorders. They are trying to work and function when their body is programed to sleep. Sleep Deprived: Lack of sleep has a serious impact on your ability to pay attention or focus on a task. Consistently getting less than the average of 8 hours your body needs creates partial or cumulative sleep deprivation. Untreated Sleep Disorders: Sleep Apnea, Narcolepsy, R.L.S., and other sleep disorders (untreated) prevent a person from getting enough restful sleep. This leads to excessive daytime sleepiness and increases the risk for drowsy driving accidents by up to 7 times.   Medications / Alcohol: Even over the counter medications can cause drowsiness. Medications that impair a drivers attention should have a warning label. Alcohol naturally makes you sleepy and on its own can cause accidents. Combined with excessive drowsiness its effects are amplified. Signs of Drowsy Driving:   * You don't remember driving the last few miles   * You may drift out of your nils   * You are unable to focus and your thoughts wander   * You may yawn more often than normal   * You have difficulty keeping your eyes open / nodding off   * Missing traffic signs, speeding, or tailgating  Prevention-   Good sleep hygiene, lifestyle and behavioral choices have the most impact on drowsy driving. There is no substitute for sleep and the average person requires 8 hours nightly. If you find yourself driving drowsy, stop and sleep. Consider the sleep hygiene tips provided during your visit as well. Medication Refill Policy: Refills for all medications require 1 week advance notice. Please have your pharmacy fax a refill request. We are unable to fax, or call in \"controled substance\" medications and you will need to pick these prescriptions up from our office. The Kernel Activation    Thank you for requesting access to [employer] Kernel. Please follow the instructions below to securely access and download your online medical record. The Kernel allows you to send messages to your doctor, view your test results, renew your prescriptions, schedule appointments, and more. How Do I Sign Up? 1. In your internet browser, go to [email or web address]. 2. Click on the First Time User? Click Here link in the Sign In box. You will see the New Member Sign Up page. 3. Enter your The Kernel Access Code exactly as it appears below. You will not need to use this code after youve completed the sign-up process. If you do not sign up before the expiration date, you must request a new code.     The Kernel Access Code: [email or web address]  Expires: 2020 12:04 PM (This is the date your InGameNow access code will )    4. Enter the last four digits of your Social Security Number (xxxx) and Date of Birth (mm/dd/yyyy) as indicated and click Submit. You will be taken to the next sign-up page. 5. Create a InGameNow ID. This will be your InGameNow login ID and cannot be changed, so think of one that is secure and easy to remember. 6. Create a InGameNow password. You can change your password at any time. 7. Enter your Password Reset Question and Answer. This can be used at a later time if you forget your password. 8. Enter your e-mail address. You will receive e-mail notification when new information is available in [address] 9. Click Sign Up. You can now view and download portions of your medical record. 10. Click the Download Summary menu link to download a portable copy of your medical information. Additional Information    If you have questions, please call [phone]. Remember, InGameNow is NOT to be used for urgent needs. For medical emergencies, dial 911.

## 2020-06-17 ENCOUNTER — APPOINTMENT (OUTPATIENT)
Dept: GENERAL RADIOLOGY | Age: 30
End: 2020-06-17
Attending: PHYSICIAN ASSISTANT
Payer: COMMERCIAL

## 2020-06-17 ENCOUNTER — HOSPITAL ENCOUNTER (EMERGENCY)
Age: 30
Discharge: HOME | End: 2020-06-17
Attending: EMERGENCY MEDICINE
Payer: COMMERCIAL

## 2020-06-17 VITALS
BODY MASS INDEX: 40.18 KG/M2 | HEART RATE: 92 BPM | TEMPERATURE: 98.4 F | SYSTOLIC BLOOD PRESSURE: 142 MMHG | HEIGHT: 67 IN | DIASTOLIC BLOOD PRESSURE: 93 MMHG | WEIGHT: 256 LBS | RESPIRATION RATE: 20 BRPM | OXYGEN SATURATION: 98 %

## 2020-06-17 DIAGNOSIS — J00 ACUTE NASOPHARYNGITIS: Primary | ICD-10-CM

## 2020-06-17 DIAGNOSIS — M54.2 NECK PAIN: ICD-10-CM

## 2020-06-17 LAB — DEPRECATED S PYO AG THROAT QL EIA: NEGATIVE

## 2020-06-17 PROCEDURE — 70360 X-RAY EXAM OF NECK: CPT

## 2020-06-17 PROCEDURE — 99283 EMERGENCY DEPT VISIT LOW MDM: CPT

## 2020-06-17 PROCEDURE — 87880 STREP A ASSAY W/OPTIC: CPT

## 2020-06-17 PROCEDURE — 87070 CULTURE OTHR SPECIMN AEROBIC: CPT

## 2020-06-17 PROCEDURE — 74011250637 HC RX REV CODE- 250/637: Performed by: PHYSICIAN ASSISTANT

## 2020-06-17 RX ORDER — DEXTROMETHORPHAN HYDROBROMIDE AND GUAIFENESIN 10; 200 MG/1; MG/1
1 CAPSULE, GELATIN COATED ORAL
Qty: 20 CAP | Refills: 0 | Status: SHIPPED | OUTPATIENT
Start: 2020-06-17 | End: 2020-07-20

## 2020-06-17 RX ORDER — LORATADINE 10 MG/1
10 TABLET ORAL DAILY
Qty: 20 TAB | Refills: 0 | Status: SHIPPED | OUTPATIENT
Start: 2020-06-17 | End: 2021-01-23

## 2020-06-17 RX ORDER — ACETAMINOPHEN 500 MG
1000 TABLET ORAL
Status: COMPLETED | OUTPATIENT
Start: 2020-06-17 | End: 2020-06-17

## 2020-06-17 RX ORDER — LIDOCAINE 50 MG/G
PATCH TOPICAL
Qty: 10 EACH | Refills: 0 | Status: SHIPPED | OUTPATIENT
Start: 2020-06-17 | End: 2021-01-23

## 2020-06-17 RX ORDER — FLUTICASONE PROPIONATE 50 MCG
2 SPRAY, SUSPENSION (ML) NASAL DAILY
Qty: 1 BOTTLE | Refills: 0 | Status: SHIPPED | OUTPATIENT
Start: 2020-06-17 | End: 2021-01-23

## 2020-06-17 RX ORDER — ACETAMINOPHEN 500 MG
1000 TABLET ORAL
Qty: 20 TAB | Refills: 0 | Status: SHIPPED | OUTPATIENT
Start: 2020-06-17 | End: 2020-07-20

## 2020-06-17 RX ADMIN — ACETAMINOPHEN 1000 MG: 500 TABLET, FILM COATED ORAL at 14:19

## 2020-06-17 NOTE — ED TRIAGE NOTES
Pt presents to ED with c/o left neck pain since Monday. Pt reports dry cough with nasal congestion beginning last night. Pt denies taking medication for symptoms.

## 2020-06-17 NOTE — DISCHARGE INSTRUCTIONS
Patient Education        Upper Respiratory Infection (Cold): Care Instructions  Your Care Instructions        An upper respiratory infection, or URI, is an infection of the nose, sinuses, or throat. URIs are spread by coughs, sneezes, and direct contact. The common cold is the most frequent kind of URI. The flu and sinus infections are other kinds of URIs. Almost all URIs are caused by viruses. Antibiotics won't cure them. But you can treat most infections with home care. This may include drinking lots of fluids and taking over-the-counter pain medicine. You will probably feel better in 4 to 10 days. The doctor has checked you carefully, but problems can develop later. If you notice any problems or new symptoms, get medical treatment right away. Follow-up care is a key part of your treatment and safety. Be sure to make and go to all appointments, and call your doctor if you are having problems. It's also a good idea to know your test results and keep a list of the medicines you take. How can you care for yourself at home? · To prevent dehydration, drink plenty of fluids, enough so that your urine is light yellow or clear like water. Choose water and other caffeine-free clear liquids until you feel better. If you have kidney, heart, or liver disease and have to limit fluids, talk with your doctor before you increase the amount of fluids you drink. · Take an over-the-counter pain medicine, such as acetaminophen (Tylenol), ibuprofen (Advil, Motrin), or naproxen (Aleve). Read and follow all instructions on the label. · Before you use cough and cold medicines, check the label. These medicines may not be safe for young children or for people with certain health problems. · Be careful when taking over-the-counter cold or flu medicines and Tylenol at the same time. Many of these medicines have acetaminophen, which is Tylenol. Read the labels to make sure that you are not taking more than the recommended dose.  Too much acetaminophen (Tylenol) can be harmful. · Get plenty of rest.  · Do not smoke or allow others to smoke around you. If you need help quitting, talk to your doctor about stop-smoking programs and medicines. These can increase your chances of quitting for good. When should you call for help? [id number] anytime you think you may need emergency care. For example, call if:  · You have severe trouble breathing. Call your doctor now or seek immediate medical care if:  · You seem to be getting much sicker. · You have new or worse trouble breathing. · You have a new or higher fever. · You have a new rash. Watch closely for changes in your health, and be sure to contact your doctor if:  · You have a new symptom, such as a sore throat, an earache, or sinus pain. · You cough more deeply or more often, especially if you notice more mucus or a change in the color of your mucus. · You do not get better as expected. Where can you learn more? Go to http://danielle-irais.info/  Enter K520 in the search box to learn more about \"Upper Respiratory Infection (Cold): Care Instructions. \"  Current as of: February 24, 2020               Content Version: 12.5  © 2684-5905 Healthwise, Incorporated. Care instructions adapted under license by [employer] (which disclaims liability or warranty for this information). If you have questions about a medical condition or this instruction, always ask your healthcare professional. [place] [address] any warranty or liability for your use of this information.

## 2020-06-17 NOTE — ED NOTES
Pt presents to ED ambulatory complaining of cough since last night and neck pain on her left side since Monday. Patient states she used her neighbors lidocaine patch and that helped but it still hurts to turn her neck. Pt is alert and oriented x 4, RR even and unlabored, skin is warm and dry. Assessment completed and pt updated on plan of care. Call bell in reach. Emergency Department Nursing Plan of Care       The Nursing Plan of Care is developed from the Nursing assessment and Emergency Department Attending provider initial evaluation. The plan of care may be reviewed in the ED Provider note.     The Plan of Care was developed with the following considerations:   Patient / Family readiness to learn indicated by:verbalized understanding  Persons(s) to be included in education: patient  Barriers to Learning/Limitations:No    Signed     Nat Kapoor RN    6/17/2020   2:26 PM

## 2020-06-17 NOTE — ED PROVIDER NOTES
EMERGENCY DEPARTMENT HISTORY AND PHYSICAL EXAM      Date: 6/17/2020  Patient Name: Pierce Merlin    History of Presenting Illness     Chief Complaint   Patient presents with    Neck Pain     left x3 days    Cough     nasal congestion beginning last night     History Provided By: Patient    HPI: Pierce Merlin, 30 y.o. female with medical history significant for hypertension, MS, ovarian cancer, former tobacco abuse, PE and DVT on previous Xarelto therapy is no longer on anticoagulation) who presents via private vehicle to the ED with cc of acute moderate aching left-sided neck pain X 3 days with new onset nasal congestion/rhinorrhea, dry cough, sore throat X 1 day. Endorses using lidocaine patch with moderate relief of neck pain. No other medications relieving factors. Pain exacerbated with movement palpation. Endorses mild swelling to the left side of her neck. Denies fever, chills, nausea, vomiting, chest pain, shortness of breath, palpitations, lightheadedness, dizziness, headache, vision changes, numbness, tingling, focal weakness, leg pain or swelling. No known injuries or trauma. Patient denies any known exposure to COVID-19, recent sick contacts, recent travel. PCP: Mario Keys MD    There are no other complaints, changes, or physical findings at this time. No current facility-administered medications on file prior to encounter. Current Outpatient Medications on File Prior to Encounter   Medication Sig Dispense Refill    rivaroxaban (XARELTO) 20 mg tab tablet Take  by mouth daily.  famotidine (PEPCID) 40 mg tablet Take 1 Tab by mouth nightly. 30 Tab 3     Past History     Past Medical History:  Past Medical History:   Diagnosis Date    Cancer ([address]     ovarian     Hypertension     with pregnancy    Ill-defined condition     MS    Ill-defined condition     TIMOTHY 1 with last pap     Past Surgical History:  History reviewed. No pertinent surgical history.   Family History:  Family History   Problem Relation Age of Onset    Kidney Disease Mother     Cancer Father      Social History:  Social History     Tobacco Use    Smoking status: Current Some Day Smoker     Packs/day: 0.25     Types: Cigars    Smokeless tobacco: Former User    Tobacco comment: black and milds occ   Substance Use Topics    Alcohol use: Yes     Alcohol/week: 3.0 standard drinks     Types: 3 Shots of liquor per week     Frequency: 2-3 times a week     Drinks per session: 1 or 2     Comment: occ    Drug use: Yes     Types: Marijuana     Comment: occ     Allergies: Allergies   Allergen Reactions    Shellfish Derived Swelling     Review of Systems   Review of Systems   Constitutional: Negative for activity change, chills, fatigue and fever. HENT: Positive for congestion, rhinorrhea and sore throat. Negative for drooling, ear discharge, ear pain, facial swelling, postnasal drip, sinus pressure, sinus pain, sneezing, trouble swallowing and voice change. Eyes: Negative for photophobia, pain, discharge and visual disturbance. Respiratory: Positive for cough. Negative for chest tightness, shortness of breath, wheezing and stridor. Cardiovascular: Negative for chest pain, palpitations and leg swelling. Gastrointestinal: Negative for abdominal pain, constipation, diarrhea, nausea and vomiting. Genitourinary: Negative. Musculoskeletal: Positive for neck pain. Negative for back pain, myalgias and neck stiffness. Skin: Negative. Negative for rash. Neurological: Negative for dizziness, syncope, weakness, light-headedness and headaches. Psychiatric/Behavioral: Negative. Negative for confusion. Physical Exam   Physical Exam  Vitals signs and nursing note reviewed. Constitutional:       General: She is not in acute distress. Appearance: She is well-developed. She is obese. She is not ill-appearing, toxic-appearing or diaphoretic. HENT:      Head: Normocephalic and atraumatic. Jaw: There is normal jaw occlusion. Right Ear: Hearing, tympanic membrane, ear canal and external ear normal.      Left Ear: Hearing, tympanic membrane, ear canal and external ear normal.      Nose: Mucosal edema, congestion and rhinorrhea present. Rhinorrhea is clear. Right Sinus: No maxillary sinus tenderness or frontal sinus tenderness. Left Sinus: No maxillary sinus tenderness or frontal sinus tenderness. Mouth/Throat:      Mouth: Mucous membranes are moist. No angioedema. Dentition: Normal dentition. No dental tenderness or dental abscesses. Tongue: No lesions. Palate: No lesions. Pharynx: Oropharynx is clear. Uvula midline. No pharyngeal swelling, oropharyngeal exudate, posterior oropharyngeal erythema or uvula swelling. Tonsils: No tonsillar exudate or tonsillar abscesses. 2+ on the right. 2+ on the left. Eyes:      General: Lids are normal. Vision grossly intact. No visual field deficit. Extraocular Movements: Extraocular movements intact. Conjunctiva/sclera: Conjunctivae normal.      Pupils: Pupils are equal, round, and reactive to light. Visual Fields: Right eye visual fields normal and left eye visual fields normal.   Neck:      Musculoskeletal: Normal range of motion and neck supple. Normal range of motion. No edema, erythema, neck rigidity, crepitus, injury, pain with movement, torticollis, spinous process tenderness or muscular tenderness. Thyroid: No thyroid mass, thyromegaly or thyroid tenderness. Trachea: Trachea and phonation normal.      Meningeal: Brudzinski's sign and Kernig's sign absent. Comments: Tenderness to palpation localized to left posterior cervical lymph nodes. Cardiovascular:      Rate and Rhythm: Normal rate and regular rhythm. Pulses: Normal pulses. Heart sounds: Normal heart sounds. Pulmonary:      Effort: Pulmonary effort is normal. No respiratory distress.       Breath sounds: Normal breath sounds. No stridor. No wheezing, rhonchi or rales. Chest:      Chest wall: No tenderness. Abdominal:      Palpations: Abdomen is soft. Tenderness: There is no abdominal tenderness. Musculoskeletal: Normal range of motion. General: Tenderness present. No deformity. Lymphadenopathy:      Cervical: No cervical adenopathy. Right cervical: No superficial, deep or posterior cervical adenopathy. Left cervical: No superficial, deep or posterior cervical adenopathy. Skin:     General: Skin is warm and dry. Neurological:      General: No focal deficit present. Mental Status: She is alert and oriented to person, place, and time. Cranial Nerves: Cranial nerves are intact. No cranial nerve deficit, dysarthria or facial asymmetry. Sensory: Sensation is intact. Motor: Motor function is intact. Psychiatric:         Behavior: Behavior normal.         Thought Content: Thought content normal.         Judgment: Judgment normal.       Diagnostic Study Results   Labs -     Recent Results (from the past 12 hour(s))   STREP AG SCREEN, GROUP A    Collection Time: 06/17/20  2:39 PM   Result Value Ref Range    Group A Strep Ag ID Negative NEG         Radiologic Studies -   XR NECK SOFT TISSUE   Final Result   IMPRESSION:   Normal prevertebral soft tissue thickness. Normal tracheopharyngeal air column. Xr Neck Soft Tissue    Result Date: 6/17/2020  IMPRESSION: Normal prevertebral soft tissue thickness. Normal tracheopharyngeal air column. Medical Decision Making   I am the first provider for this patient. I reviewed the vital signs, available nursing notes, past medical history, past surgical history, family history and social history. Vital Signs-Reviewed the patient's vital signs.   Patient Vitals for the past 24 hrs:   Temp Pulse Resp BP SpO2   06/17/20 1400 98.4 °F (36.9 °C) 92 20 (!) 142/93 98 %     Pulse Oximetry Analysis - 98% on RA and normal    Records Reviewed: Nursing Notes, Old Medical Records, Previous Radiology Studies and Previous Laboratory Studies    Provider Notes (Medical Decision Making):   Patient presents with neck pain. DDx: Trapezius muscle strain, SCM strain, contusion, cervical radiculopathy. NO evidence of cervical spine injury or fracture, carotid dissection. The patient complains of sore throat. Has non-productive cough without dyspnea or wheezing. Symptoms are consistent with an uncomplicated viral pharyngitis. DDx: strep throat, candidal infection. No evidence of peritonsillar abscess or retropharyngeal abscess. Will treat with supportive care such as fluids, chloroseptic spray, analgesics. Lack of antibiotic effectiveness discussed with her. Symptomatic therapy suggested: gargle for sore throat, use mist at bedside for congestion. Apply facial warm packs for sinus pain or use nasal saline sprays. Follow up prn if not better in 72 hours. ED Course:   Initial assessment performed. The patients presenting problems have been discussed, and they are in agreement with the care plan formulated and outlined with them. I have encouraged them to ask questions as they arise throughout their visit. ED Course as of Jun 17 1506   Wed Jun 17, 2020   1501 Discussed xr neck soft tissue results and reviewed imaging with attending, Dr. Singh Post, he endorses that the abnormalities on xray may actually be one of the pt's hairbraids. Low suspicion for carotid artery dissection or occlusion based on pt's hx and exam findings. [SM]      ED Course User Index  [SM] Seema Cordero PA-C       Progress Note:   Updated pt on all returned results and findings. Discussed the importance of proper follow up as referred below along with return precautions. Pt in agreement with the care plan and expresses agreement with and understanding of all items discussed. Disposition:  3:06 PM  I have discussed with patient their diagnosis, treatment, and follow up plan. The patient agrees to follow up as outlined in discharge paperwork and also to return to the ED with any worsening. Chepe Bills PA-C      PLAN:  1. Current Discharge Medication List      START taking these medications    Details   loratadine (Claritin) 10 mg tablet Take 1 Tab by mouth daily. Qty: 20 Tab, Refills: 0      fluticasone propionate (FLONASE) 50 mcg/actuation nasal spray 2 Sprays by Both Nostrils route daily. Qty: 1 Bottle, Refills: 0      dextromethorphan-guaiFENesin (Coricidin HBP Chest Darinel-Cough)  mg cap Take 1 Tab by mouth every six (6) hours as needed for Cough. Qty: 20 Cap, Refills: 0      acetaminophen (TYLENOL) 500 mg tablet Take 2 Tabs by mouth every six (6) hours as needed for Pain. Qty: 20 Tab, Refills: 0      lidocaine (LIDODERM) 5 % Apply patch to the affected area for 12 hours a day and remove for 12 hours a day. Qty: 10 Each, Refills: 0         CONTINUE these medications which have NOT CHANGED    Details   rivaroxaban (XARELTO) 20 mg tab tablet Take  by mouth daily. famotidine (PEPCID) 40 mg tablet Take 1 Tab by mouth nightly. Qty: 30 Tab, Refills: 3           2. Follow-up Information     Follow up With Specialties Details Why Contact Info    Kevin Briggs MD Internal Medicine Schedule an appointment as soon as possible for a visit in 1 week As needed, If symptoms worsen [phone] [phone]          Return to ED if worse     Diagnosis     Clinical Impression:   1. Acute nasopharyngitis    2. Neck pain            Please note that this dictation was completed with Dragon, computer voice recognition software. Quite often unanticipated grammatical, syntax, homophones, and other interpretive errors are inadvertently transcribed by the computer software. Please disregard these errors. Additionally, please excuse any errors that have escaped final proofreading.

## 2020-06-17 NOTE — ED NOTES
Discharge instructions were given to the patient by Lynita Closs, RN. The patient left the Emergency Department ambulatory, alert and oriented and in no acute distress with 5 prescriptions. The patient was encouraged to call or return to the ED for worsening issues or problems and was encouraged to schedule a follow up appointment for continuing care. The patient verbalized understanding of discharge instructions and prescriptions, all questions were answered. The patient has no further concerns at this time.

## 2020-06-18 ENCOUNTER — PATIENT OUTREACH (OUTPATIENT)
Dept: CASE MANAGEMENT | Age: 30
End: 2020-06-18

## 2020-06-18 NOTE — PROGRESS NOTES
Patient contacted regarding COVID-19  risk. Discussed COVID-19 related testing which was not done at this time. Test results were not done. Patient informed of results, if available? n/a     Care Transition Nurse/ Ambulatory Care Manager contacted the patient by telephone to perform post discharge assessment. Verified name and  with patient as identifiers. Provided introduction to self, and explanation of the CTN/ACM role, and reason for call due to risk factors for infection and/or exposure to COVID-19. Symptoms reviewed with patient who verbalized the following symptoms: no new symptoms and no worsening symptoms. Due to no new or worsening symptoms encounter was not routed to provider for escalation. Discussed follow-up appointments. If no appointment was previously scheduled, appointment scheduling offered: yes  [address] follow up appointment(s):   Future Appointments   Date Time Provider Mayda Uribe   3/26/2021 11:40 AM Karla Carlson  [address]     Non-[facility] follow up appointment(s): pt states she will follow up with her primary care if needed. Patient has following risk factors of: immunocompromised and hypertension. CTN/ACM reviewed discharge instructions, medical action plan and red flags such as increased shortness of breath, increasing fever and signs of decompensation with patient who verbalized understanding. Discussed exposure protocols and quarantine with CDC Guidelines What to do if you are sick with coronavirus disease .  Patient was given an opportunity for questions and concerns. The patient agrees to contact the [facility] exposure line [phone], [facility]  ([phone]) and PCP office for questions related to their healthcare. CTN/ACM provided contact information for future needs. Reviewed and educated patient on any new and changed medications related to discharge diagnosis.     Patient/family/caregiver given information for [facility] Loop and agrees to enroll no  Patient's preferred e-mail:  n/a  Patient's preferred phone number: n/a  Based on Loop alert triggers, patient will be contacted by nurse care manager for worsening symptoms. Plan for follow up in 14 days based on severity of symptoms and risk factors.

## 2020-06-19 LAB
BACTERIA SPEC CULT: NORMAL
SERVICE CMNT-IMP: NORMAL

## 2020-07-02 ENCOUNTER — PATIENT OUTREACH (OUTPATIENT)
Dept: CASE MANAGEMENT | Age: 30
End: 2020-07-02

## 2020-07-02 NOTE — PROGRESS NOTES
Patient resolved from Transition of Care episode on 7/2/20. Discussed COVID-19 related testing which was not done at this time. Test results were not done. Patient informed of results, if available? n/a     Patient/family has been provided the following resources and education related to COVID-19:                         Signs, symptoms and red flags related to COVID-19            [facility] exposure and quarantine guidelines            [facility] exposure contact - [phone]            Contact for their local Department of Health              Patient currently reports that the following symptoms have improved:  no new symptoms and no worsening symptoms. No further outreach scheduled with this CTN/ACM/LPN/HC/ MA. Episode of Care resolved. Patient has this CTN/ACM/LPN/HC/MA contact information if future needs arise.

## 2020-07-20 ENCOUNTER — APPOINTMENT (OUTPATIENT)
Dept: VASCULAR SURGERY | Age: 30
End: 2020-07-20
Attending: PHYSICIAN ASSISTANT
Payer: COMMERCIAL

## 2020-07-20 ENCOUNTER — HOSPITAL ENCOUNTER (EMERGENCY)
Age: 30
Discharge: HOME | End: 2020-07-20
Attending: EMERGENCY MEDICINE
Payer: COMMERCIAL

## 2020-07-20 VITALS
HEART RATE: 94 BPM | BODY MASS INDEX: 39.94 KG/M2 | DIASTOLIC BLOOD PRESSURE: 100 MMHG | HEIGHT: 67 IN | RESPIRATION RATE: 18 BRPM | TEMPERATURE: 98.6 F | WEIGHT: 254.5 LBS | SYSTOLIC BLOOD PRESSURE: 153 MMHG | OXYGEN SATURATION: 99 %

## 2020-07-20 DIAGNOSIS — R20.2 LEFT LEG PARESTHESIAS: Primary | ICD-10-CM

## 2020-07-20 DIAGNOSIS — M79.605 LOWER EXTREMITY PAIN, POSTERIOR, LEFT: ICD-10-CM

## 2020-07-20 PROCEDURE — 99284 EMERGENCY DEPT VISIT MOD MDM: CPT

## 2020-07-20 PROCEDURE — 74011250636 HC RX REV CODE- 250/636: Performed by: PHYSICIAN ASSISTANT

## 2020-07-20 PROCEDURE — 96372 THER/PROPH/DIAG INJ SC/IM: CPT

## 2020-07-20 PROCEDURE — 93971 EXTREMITY STUDY: CPT

## 2020-07-20 RX ORDER — METHYLPREDNISOLONE 4 MG/1
TABLET ORAL
Qty: 1 DOSE PACK | Refills: 0 | Status: SHIPPED | OUTPATIENT
Start: 2020-07-20 | End: 2021-01-23

## 2020-07-20 RX ORDER — KETOROLAC TROMETHAMINE 30 MG/ML
30 INJECTION, SOLUTION INTRAMUSCULAR; INTRAVENOUS
Status: COMPLETED | OUTPATIENT
Start: 2020-07-20 | End: 2020-07-20

## 2020-07-20 RX ADMIN — KETOROLAC TROMETHAMINE 30 MG: 30 INJECTION, SOLUTION INTRAMUSCULAR; INTRAVENOUS at 22:40

## 2020-07-21 NOTE — ED NOTES
Pt presents ambulatory to ED complaining of L leg pain, swelling, numbness x 1 week and a half. Pt says that she has been having pain sensation behind the calf x 3 days. Pt reports a history of DVT and PE. Pt says she was on a 6 month course of xarelto and has been off x 10 months. Pt denies current SOB pr chest pain. Pt is alert and oriented x 4, RR even and unlabored, skin is warm and dry. Assesment completed and pt updated on plan of care. Emergency Department Nursing Plan of Care       The Nursing Plan of Care is developed from the Nursing assessment and Emergency Department Attending provider initial evaluation. The plan of care may be reviewed in the ED Provider note.     The Plan of Care was developed with the following considerations:   Patient / Family readiness to learn indicated by:verbalized understanding  Persons(s) to be included in education: patient  Barriers to Learning/Limitations:No    Signed     [address], RN    7/20/2020   9:02 PM

## 2020-07-21 NOTE — ED TRIAGE NOTES
Pt reports left lower leg tingling and swelling. Pt reports hx of blood clots, not on blood thinners any more.  Pt reports pain to posterior knee

## 2020-07-21 NOTE — DISCHARGE INSTRUCTIONS
Patient Education        Numbness and Tingling: Care Instructions  Your Care Instructions     Many things can cause numbness or tingling. Swelling may put pressure on a nerve. This could cause you to lose feeling or have a pins-and-needles sensation on part of your body. Nerves may be damaged from trauma, toxins, or diseases, such as diabetes or multiple sclerosis (MS). Sometimes, though, the cause is not clear. If there is no clear reason for your symptoms, and you are not having any other symptoms, your doctor may suggest watching and waiting for a while to see if the numbness or tingling goes away on its own. Your doctor may want you to have blood or nerve tests to find the cause of your symptoms. Follow-up care is a key part of your treatment and safety. Be sure to make and go to all appointments, and call your doctor if you are having problems. It's also a good idea to know your test results and keep a list of the medicines you take. How can you care for yourself at home? · If your doctor prescribes medicine, take it exactly as directed. Call your doctor if you think you are having a problem with your medicine. · If you have any swelling, put ice or a cold pack on the area for 10 to 20 minutes at a time. Put a thin cloth between the ice and your skin. When should you call for help? [id number] anytime you think you may need emergency care. For example, call if:  · You have weakness, numbness, or tingling in both legs. · You lose bowel or bladder control. · You have symptoms of a stroke. These may include:  ? Sudden numbness, tingling, weakness, or loss of movement in your face, arm, or leg, especially on only one side of your body. ? Sudden vision changes. ? Sudden trouble speaking. ? Sudden confusion or trouble understanding simple statements. ? Sudden problems with walking or balance. ? A sudden, severe headache that is different from past headaches.   Watch closely for changes in your health, and be sure to contact your doctor if you have any problems, or if:  · You do not get better as expected. Where can you learn more? Go to http://danielle-irais.info/  Enter U128 in the search box to learn more about \"Numbness and Tingling: Care Instructions. \"  Current as of: November 20, 2019               Content Version: 12.5  © 8905-5231 [employer]. Care instructions adapted under license by [employer] (which disclaims liability or warranty for this information). If you have questions about a medical condition or this instruction, always ask your healthcare professional. [address] any warranty or liability for your use of this information. Patient Education        Leg Pain: Care Instructions  Your Care Instructions  Many things can cause leg pain. Too much exercise or overuse can cause a muscle cramp (or charley horse). You can get leg cramps from not eating a balanced diet that has enough potassium, calcium, and other minerals. If you do not drink enough fluids or are taking certain medicines, you may develop leg cramps. Other causes of leg pain include injuries, blood flow problems, nerve damage, and twisted and enlarged veins (varicose veins). You can usually ease pain with self-care. Your doctor may recommend that you rest your leg and keep it elevated. Follow-up care is a key part of your treatment and safety. Be sure to make and go to all appointments, and call your doctor if you are having problems. It's also a good idea to know your test results and keep a list of the medicines you take. How can you care for yourself at home? · Take pain medicines exactly as directed. ? If the doctor gave you a prescription medicine for pain, take it as prescribed. ? If you are not taking a prescription pain medicine, ask your doctor if you can take an over-the-counter medicine. · Take any other medicines exactly as prescribed.  Call your doctor if you think you are having a problem with your medicine. · Rest your leg while you have pain, and avoid standing for long periods of time. · Prop up your leg at or above the level of your heart when possible. · Make sure you are eating a balanced diet that is rich in calcium, potassium, and magnesium, especially if you are pregnant. · If directed by your doctor, put ice or a cold pack on the area for 10 to 20 minutes at a time. Put a thin cloth between the ice and your skin. · Your leg may be in a splint, a brace, or an elastic bandage, and you may have crutches to help you walk. Follow your doctor's directions about how long to wear supports and how to use the crutches. When should you call for help? [id number] anytime you think you may need emergency care. For example, call if:  · You have sudden chest pain and shortness of breath, or you cough up blood. · Your leg is cool or pale or changes color. Call your doctor now or seek immediate medical care if:  · You have increasing or severe pain. · Your leg suddenly feels weak and you cannot move it. · You have signs of a blood clot, such as:  ? Pain in your calf, back of the knee, thigh, or groin. ? Redness and swelling in your leg or groin. · You have signs of infection, such as:  ? Increased pain, swelling, warmth, or redness. ? Red streaks leading from the sore area. ? Pus draining from a place on your leg. ? A fever. · You cannot bear weight on your leg. Watch closely for changes in your health, and be sure to contact your doctor if:  · You do not get better as expected. Where can you learn more? Go to http://www.gray.com/  Enter D663 in the search box to learn more about \"Leg Pain: Care Instructions. \"  Current as of: June 26, 2019               Content Version: 12.5  © 0932-1920 [employer].    Care instructions adapted under license by [employer] (which disclaims liability or warranty for this information). If you have questions about a medical condition or this instruction, always ask your healthcare professional. [place] [address] any warranty or liability for your use of this information.

## 2020-07-22 ENCOUNTER — OFFICE VISIT (OUTPATIENT)
Dept: INTERNAL MEDICINE CLINIC | Age: 30
End: 2020-07-22

## 2020-07-22 VITALS
HEART RATE: 96 BPM | WEIGHT: 249 LBS | BODY MASS INDEX: 39.08 KG/M2 | HEIGHT: 67 IN | RESPIRATION RATE: 19 BRPM | SYSTOLIC BLOOD PRESSURE: 138 MMHG | DIASTOLIC BLOOD PRESSURE: 90 MMHG | TEMPERATURE: 99 F | OXYGEN SATURATION: 98 %

## 2020-07-22 DIAGNOSIS — M79.605 PAIN OF LEFT LOWER EXTREMITY: Primary | ICD-10-CM

## 2020-07-22 NOTE — PROGRESS NOTES
Chief Complaint   Patient presents with   [facility] ED Follow-up     7/20/2020 [facility] for L leg pain     1. Have you been to the ER, urgent care clinic since your last visit? Hospitalized since your last visit? No    2. Have you seen or consulted any other health care providers outside of the [address] [place] [address] since your last visit? Include any pap smears or colon screening.  No

## 2020-07-22 NOTE — PROGRESS NOTES
Jimy Heath is a 30 y.o. female and presents with ED Follow-up (7/20/2020 [facility] for L leg pain)  . Subjective:    Previous PCP-Dr. Sheryl Mandujano    Pt has a h/o DVT and PE dx'ed 6/19 s/p 6 mth tx w NOAC and instructed to d/c birth control. Pt presented to ED 2 days ago for lle pain and was concerned re:recurrence of DVT. Duplex done-NEG. Pt w c/o some numbness of anterior shin. No swelling. Review of Systems  Review of systems (12) negative, except noted above. Past Medical History:   Diagnosis Date    Cancer ([facility])     ovarian     Hypertension     with pregnancy    Ill-defined condition     MS    Ill-defined condition     TIMOTHY 1 with last pap     History reviewed. No pertinent surgical history. Social History     Socioeconomic History    Marital status: SINGLE     Spouse name: Not on file    Number of children: Not on file    Years of education: Not on file    Highest education level: Not on file   Tobacco Use    Smoking status: Current Some Day Smoker     Packs/day: 0.25     Years: 10.00     Pack years: 2.50     Types: Cigars    Smokeless tobacco: Former User    Tobacco comment: black and milds occ   Substance and Sexual Activity    Alcohol use: Yes     Alcohol/week: 3.0 standard drinks     Types: 3 Shots of liquor per week     Frequency: 2-3 times a week     Drinks per session: 1 or 2     Comment: occ    Drug use: Yes     Types: Marijuana     Comment: occ    Sexual activity: Yes     Partners: Male     Birth control/protection: I.U.D. Comment: monthly inserts     Family History   Problem Relation Age of Onset    Kidney Disease Mother     Cancer Father      Current Outpatient Medications   Medication Sig Dispense Refill    methylPREDNISolone (Medrol, Leonel,) 4 mg tablet Take as instructed 1 Dose Pack 0    loratadine (Claritin) 10 mg tablet Take 1 Tab by mouth daily. 20 Tab 0    fluticasone propionate (FLONASE) 50 mcg/actuation nasal spray 2 Sprays by Both Nostrils route daily.  1 Bottle 0    lidocaine (LIDODERM) 5 % Apply patch to the affected area for 12 hours a day and remove for 12 hours a day. 10 Each 0    famotidine (PEPCID) 40 mg tablet Take 1 Tab by mouth nightly. 30 Tab 3     Allergies   Allergen Reactions    Shellfish Derived Swelling       Objective:  Visit Vitals  /90 (BP 1 Location: Left arm, BP Patient Position: Sitting)   Pulse 96   Temp 99 °F (37.2 °C) (Oral)   Resp 19   Ht 5' 7\" (1.702 m)   Wt 249 lb (112.9 kg)   LMP 07/02/2020   SpO2 98%   BMI 39.00 kg/m²     Physical Exam:   General appearance - alert, morbidly obese, and in no distress   Mental status - alert, oriented to person, place, and time  EYE-EOMI  Neck - supple,   Chest - symmetric air entry    Abdomen - obese  Ext-no pedal edema, no clubbing or cyanosis  Skin-Warm and dry. no hyperpigmentation, vitiligo, or suspicious lesions  Neuro -alert, oriented, normal speech, no focal findings or movement disorder noted        Results for orders placed or performed during the hospital encounter of 06/17/20   STREP AG SCREEN, GROUP A    Specimen: Serum; Throat   Result Value Ref Range    Group A Strep Ag ID Negative NEG     CULTURE, THROAT    Specimen: Throat Swab   Result Value Ref Range    Special Requests: NO SPECIAL REQUESTS      Culture result: NORMAL RESPIRATORY HILDA/NO BETA STREP ISOLATED         Assessment/Plan:    ICD-10-CM ICD-9-CM    1. Pain of left lower extremity  M79.605 729.5      No orders of the defined types were placed in this encounter. 1. Pain of left lower extremity  Improved w/o intervention    There are no Patient Instructions on file for this visit. I have reviewed with the patient details of the assessment and plan and all questions were answered. Relevent patient education was performed. The most recent lab findings were reviewed with the patient. An After Visit Summary was printed and given to the patient.

## 2021-01-23 ENCOUNTER — APPOINTMENT (OUTPATIENT)
Dept: GENERAL RADIOLOGY | Age: 31
End: 2021-01-23
Attending: EMERGENCY MEDICINE
Payer: COMMERCIAL

## 2021-01-23 ENCOUNTER — HOSPITAL ENCOUNTER (EMERGENCY)
Age: 31
Discharge: HOME | End: 2021-01-23
Attending: EMERGENCY MEDICINE
Payer: COMMERCIAL

## 2021-01-23 VITALS
HEART RATE: 106 BPM | DIASTOLIC BLOOD PRESSURE: 97 MMHG | TEMPERATURE: 99.3 F | RESPIRATION RATE: 20 BRPM | OXYGEN SATURATION: 100 % | BODY MASS INDEX: 39.05 KG/M2 | SYSTOLIC BLOOD PRESSURE: 134 MMHG | WEIGHT: 243 LBS | HEIGHT: 66 IN

## 2021-01-23 DIAGNOSIS — R50.9 ACUTE FEBRILE ILLNESS: Primary | ICD-10-CM

## 2021-01-23 LAB
FLUAV AG NPH QL IA: NEGATIVE
FLUBV AG NOSE QL IA: NEGATIVE
SARS-COV-2, COV2: NORMAL

## 2021-01-23 PROCEDURE — 71045 X-RAY EXAM CHEST 1 VIEW: CPT

## 2021-01-23 PROCEDURE — U0003 INFECTIOUS AGENT DETECTION BY NUCLEIC ACID (DNA OR RNA); SEVERE ACUTE RESPIRATORY SYNDROME CORONAVIRUS 2 (SARS-COV-2) (CORONAVIRUS DISEASE [COVID-19]), AMPLIFIED PROBE TECHNIQUE, MAKING USE OF HIGH THROUGHPUT TECHNOLOGIES AS DESCRIBED BY CMS-2020-01-R: HCPCS

## 2021-01-23 PROCEDURE — 87804 INFLUENZA ASSAY W/OPTIC: CPT

## 2021-01-23 PROCEDURE — 99283 EMERGENCY DEPT VISIT LOW MDM: CPT

## 2021-01-23 PROCEDURE — U0005 INFEC AGEN DETEC AMPLI PROBE: HCPCS

## 2021-01-23 PROCEDURE — 74011250637 HC RX REV CODE- 250/637: Performed by: EMERGENCY MEDICINE

## 2021-01-23 RX ORDER — IBUPROFEN 400 MG/1
800 TABLET ORAL
Status: COMPLETED | OUTPATIENT
Start: 2021-01-23 | End: 2021-01-23

## 2021-01-23 RX ADMIN — IBUPROFEN 800 MG: 400 TABLET, FILM COATED ORAL at 12:14

## 2021-01-23 NOTE — ED NOTES
Discharge instructions were given to the patient by Emmanuel Vasques RN. The patient left the Emergency Department ambulatory, alert and oriented and in no acute distress with 0 prescriptions. The patient was encouraged to call or return to the ED for worsening issues or problems and was encouraged to schedule a follow up appointment for continuing care. The patient verbalized understanding of discharge instructions and prescriptions, all questions were answered. The patient has no further concerns at this time.

## 2021-01-23 NOTE — ED NOTES
Pt presents to ED ambulatory complaining of generalized body aches, fever, dry cough x3 days. Pt reports taking OTC medications without relief. Pt reports fevers come and go. Pt is alert and oriented x 4, RR even and unlabored, skin is warm and dry. Assessment completed and pt updated on plan of care.  Call bell in reach.       Emergency Department Nursing Plan of Care       The Nursing Plan of Care is developed from the Nursing assessment and Emergency Department Attending provider initial evaluation.  The plan of care may be reviewed in the “ED Provider note”.    The Plan of Care was developed with the following considerations:   Patient / Family readiness to learn indicated by:verbalized understanding  Persons(s) to be included in education: patient  Barriers to Learning/Limitations:No    Signed     Lola Lion RN    1/23/2021   12:39 PM

## 2021-01-23 NOTE — ED PROVIDER NOTES
EMERGENCY DEPARTMENT HISTORY AND PHYSICAL EXAM      Date: 1/23/2021  Patient Name: Pamela Barragan    History of Presenting Illness     Chief Complaint   Patient presents with    Fever       History Provided By: Patient    HPI: Pamela Barragan, 26 y.o. female with PMHx significant for ovarian cancer, presents ambulatory to the ED with cc of mild to moderate dry cough, body aches, chills x a few days. Denies any alleviating or exacerbating factors. Denies associated NVD. Denies known COVID exposure. Did not get her flu shot this year. Pt specifically denies any CP, SOB, abd pain, NVD. There are no other complaints, changes, or physical findings at this time. PCP: Jenniffer Aschoff, MD    No current facility-administered medications on file prior to encounter. Current Outpatient Medications on File Prior to Encounter   Medication Sig Dispense Refill    [DISCONTINUED] methylPREDNISolone (Medrol, Leonel,) 4 mg tablet Take as instructed 1 Dose Pack 0    [DISCONTINUED] loratadine (Claritin) 10 mg tablet Take 1 Tab by mouth daily. 20 Tab 0    [DISCONTINUED] fluticasone propionate (FLONASE) 50 mcg/actuation nasal spray 2 Sprays by Both Nostrils route daily. 1 Bottle 0    [DISCONTINUED] lidocaine (LIDODERM) 5 % Apply patch to the affected area for 12 hours a day and remove for 12 hours a day. 10 Each 0    [DISCONTINUED] famotidine (PEPCID) 40 mg tablet Take 1 Tab by mouth nightly. 30 Tab 3       Past History     Past Medical History:  Past Medical History:   Diagnosis Date    Cancer ([facility] [address]     ovarian     Hypertension     with pregnancy    Ill-defined condition     MS    Ill-defined condition     TIMOTHY 1 with last pap       Past Surgical History:  History reviewed. No pertinent surgical history.     Family History:  Family History   Problem Relation Age of Onset    Kidney Disease Mother     Cancer Father        Social History:  Social History     Tobacco Use    Smoking status: Current Some Day Smoker Packs/day: 0.25     Years: 10.00     Pack years: 2.50     Types: Cigars    Smokeless tobacco: Former User    Tobacco comment: black and milds occ   Substance Use Topics    Alcohol use: Yes     Alcohol/week: 3.0 standard drinks     Types: 3 Shots of liquor per week     Frequency: 2-3 times a week     Drinks per session: 1 or 2     Comment: occ    Drug use: Yes     Types: Marijuana     Comment: occ       Allergies: Allergies   Allergen Reactions    Shellfish Derived Swelling         Review of Systems   Review of Systems   Constitutional: Positive for chills. HENT: Negative for sore throat. Eyes: Negative for photophobia and redness. Respiratory: Positive for cough. Negative for shortness of breath and wheezing. Cardiovascular: Negative for chest pain and leg swelling. Gastrointestinal: Negative for abdominal pain, blood in stool, nausea and vomiting. Genitourinary: Negative for difficulty urinating, dysuria, hematuria, menstrual problem and vaginal bleeding. Musculoskeletal: Positive for myalgias. Negative for back pain and joint swelling. Neurological: Negative for dizziness, seizures, syncope, speech difficulty, weakness, numbness and headaches. Hematological: Negative for adenopathy. Psychiatric/Behavioral: Negative for agitation, confusion and suicidal ideas. The patient is not nervous/anxious. Physical Exam   Physical Exam  Vitals signs and nursing note reviewed. Constitutional:       General: She is not in acute distress. Appearance: She is well-developed. HENT:      Head: Normocephalic and atraumatic. Eyes:      Conjunctiva/sclera: Conjunctivae normal.      Pupils: Pupils are equal, round, and reactive to light. Neck:      Musculoskeletal: Normal range of motion and neck supple. Cardiovascular:      Rate and Rhythm: Normal rate and regular rhythm. Heart sounds: Normal heart sounds.    Pulmonary:      Effort: Pulmonary effort is normal. No respiratory distress. Breath sounds: Normal breath sounds. No wheezing. Abdominal:      General: Bowel sounds are normal. There is no distension. Palpations: Abdomen is soft. Tenderness: There is no abdominal tenderness. Musculoskeletal: Normal range of motion. General: No tenderness. Skin:     General: Skin is warm and dry. Findings: No rash. Neurological:      Mental Status: She is alert and oriented to person, place, and time. Cranial Nerves: No cranial nerve deficit. Psychiatric:         Behavior: Behavior normal.         Diagnostic Study Results     Labs -     Recent Results (from the past 12 hour(s))   INFLUENZA A+B VIRAL AGS    Collection Time: 01/23/21 12:14 PM   Result Value Ref Range    Influenza A Antigen Negative NEG      Influenza B Antigen Negative NEG     SARS-COV-2    Collection Time: 01/23/21 12:14 PM   Result Value Ref Range    SARS-CoV-2 Please find results under separate order         Radiologic Studies -   XR CHEST PORT   Final Result   No acute findings no change. CT Results  (Last 48 hours)    None        CXR Results  (Last 48 hours)               01/23/21 1223  XR CHEST PORT Final result    Impression:  No acute findings no change. Narrative:  Clinical indication: Chest pain. Portable AP semierect view of the chest is obtained, comparison February 15,   2015. Dextroscoliosis once again demonstrated in the thoracic spine. The heart size is normal.   No acute infiltrate. Medical Decision Making   I am the first provider for this patient. I reviewed the vital signs, available nursing notes, past medical history, past surgical history, family history and social history. Vital Signs-Reviewed the patient's vital signs.   Patient Vitals for the past 12 hrs:   Temp Pulse Resp BP SpO2   01/23/21 1158 99.3 °F (37.4 °C) (!) 106 20 (!) 134/97 100 %     Records Reviewed: Nursing Notes and Old Medical Records    Provider Notes (Medical Decision Making):   DDx: COVID, flu, PNA, bronchitis, viral syndrome    ED Course:   Initial assessment performed. The patients presenting problems have been discussed, and they are in agreement with the care plan formulated and outlined with them. I have encouraged them to ask questions as they arise throughout their visit. 12:59 PM  Pt reevaluated. States she is feeling better. Advised pt to quarantine until COVID results have come back. Return precautions given. Critical Care Time:   none    Disposition:  DISCHARGE  The patient has been re-evaluated and is ready for discharge. Reviewed available results with patient. Counseled pt on diagnosis and care plan. Pt has expressed understanding, and all questions have been answered. Pt agrees with plan and agrees to follow up as recommended, or return to the ED if their symptoms worsen. Discharge instructions have been provided and explained to the pt, along with reasons to return to the ED. PLAN:  1. There are no discharge medications for this patient. 2.   Follow-up Information     Follow up With Specialties Details Why Contact Info    Kevin Briggs MD Internal Medicine In 1 week  [address] [facility]  [phone]          Return to ED if worse     Diagnosis     Clinical Impression:   1. Acute febrile illness        Attestations: This note is prepared by Linda Salas, acting as Scribe for Ciara Shelton MD.    Ciara Shelton MD: The scribe's documentation has been prepared under my direction and personally reviewed by me in its entirety. I confirm that the note above accurately reflects all work, treatment, procedures, and medical decision making performed by me.

## 2021-01-25 ENCOUNTER — PATIENT OUTREACH (OUTPATIENT)
Dept: CASE MANAGEMENT | Age: 31
End: 2021-01-25

## 2021-01-25 LAB
SARS-COV-2, XPLCVT: DETECTED
SOURCE, COVRS: ABNORMAL

## 2021-01-25 NOTE — PROGRESS NOTES
01/25/21 Called patient for COVID follow up, within 2 business days- results pending at time of outreach. Patient returned ACM call and then disconnected phone call during evaluation. Resolving encounter at this time.  APM

## 2021-01-26 NOTE — PROGRESS NOTES
The patient was called for notification of a POSITIVE test result for COVID-19. The following information was given to the patient:    The COVID-19 test result was positive  Mild and stable symptoms are managed at home    Treatment of coronavirus does not require an antibiotic   For most persons with COVID-19 illness, isolation and precautions can generally be discontinued 10 days after symptom onset and resolution of fever for at least 24 hours, without the use of fever-reducing medications, and with improvement of other symptoms. A limited number of persons with severe illness or severe immunosuppression may produce replication-competent virus beyond 10 days that may warrant extending duration of isolation and precautions for up to 20 days after symptom onset. For persons who never develop symptoms, isolation and other precautions can be discontinued 10 days after the date of their first positive RT-PCR test for SARS-CoV-2 RNA. Patient was instructed to remain isolated until 2/1/21  Wash hands often with soap and water for at least 20 seconds or alternatively use hand  with at least 60% alcohol content  Cover coughs and sneezes  Wear a mask when around others if possible  Clean all high-touch surfaces every day, such as doorknobs and cellphones  Continually monitor symptoms.  Contact your medical provider if symptoms are worsening, such as difficulty breathing  For more information visit the CDC website: DotProtection.gl

## 2021-03-26 ENCOUNTER — VIRTUAL VISIT (OUTPATIENT)
Dept: SLEEP MEDICINE | Age: 31
End: 2021-03-26
Payer: COMMERCIAL

## 2021-03-26 DIAGNOSIS — G47.33 OSA (OBSTRUCTIVE SLEEP APNEA): Primary | ICD-10-CM

## 2021-03-26 PROCEDURE — 99213 OFFICE O/P EST LOW 20 MIN: CPT | Performed by: NURSE PRACTITIONER

## 2021-03-26 NOTE — PATIENT INSTRUCTIONS
[address] [facility] [place]. [address] [facility], [address] Tel.  [phone] Fax. [address] [facility] [address], [address] [facility] Tel.  [phone] Fax. [phone] [address] Tel.  [phone] Fax. [phone] Learning About CPAP for Sleep Apnea What is CPAP? CPAP is a small machine that you use at home every night while you sleep. It increases air pressure in your throat to keep your airway open. When you have sleep apnea, this can help you sleep better so you feel much better. CPAP stands for \"continuous positive airway pressure. \" The CPAP machine will have one of the following: · A mask that covers your nose and mouth · Prongs that fit into your nose · A mask that covers your nose only, the most common type. This type is called NCPAP. The N stands for \"nasal.\" Why is it done? CPAP is usually the best treatment for obstructive sleep apnea. It is the first treatment choice and the most widely used. Your doctor may suggest CPAP if you have: · Moderate to severe sleep apnea. · Sleep apnea and coronary artery disease (CAD) or heart failure. How does it help? · CPAP can help you have more normal sleep, so you feel less sleepy and more alert during the daytime. · CPAP may help keep heart failure or other heart problems from getting worse. · NCPAP may help lower your blood pressure. · If you use CPAP, your bed partner may also sleep better because you are not snoring or restless. What are the side effects? Some people who use CPAP have: · A dry or stuffy nose and a sore throat. · Irritated skin on the face. · Sore eyes. · Bloating. If you have any of these problems, work with your doctor to fix them. Here are some things you can try: · Be sure the mask or nasal prongs fit well. · See if your doctor can adjust the pressure of your CPAP. · If your nose is dry, try a humidifier.  
· If your nose is runny or stuffy, try decongestant medicine or a steroid nasal spray. If these things do not help, you might try a different type of machine. Some machines have air pressure that adjusts on its own. Others have air pressures that are different when you breathe in than when you breathe out. This may reduce discomfort caused by too much pressure in your nose. Where can you learn more? Go to Bright.md.be Enter J271 in the search box to learn more about \"Learning About CPAP for Sleep Apnea. \"  
© 8980-7328 Healthwise, [employer]orporated. Care instructions adapted under license by [employer] (which disclaims liability or warranty for this information). This care instruction is for use with your licensed healthcare professional. If you have questions about a medical condition or this instruction, always ask your healthcare professional. [address] any warranty or liability for your use of this information. Content Version: 8.1.73344; Last Revised: January 11, 2010 PROPER SLEEP HYGIENE What to avoid · Do not have drinks with caffeine, such as coffee or black tea, for 8 hours before bed. · Do not smoke or use other types of tobacco near bedtime. Nicotine is a stimulant and can keep you awake. · Avoid drinking alcohol late in the evening, because it can cause you to wake in the middle of the night. · Do not eat a big meal close to bedtime. If you are hungry, eat a light snack. · Do not drink a lot of water close to bedtime, because the need to urinate may wake you up during the night. · Do not read or watch TV in bed. Use the bed only for sleeping and sexual activity. What to try · Go to bed at the same time every night, and wake up at the same time every morning. Do not take naps during the day. · Keep your bedroom quiet, dark, and cool. · Get regular exercise, but not within 3 to 4 hours of your bedtime. Micah Benavidez · Sleep on a comfortable pillow and mattress.  
· If watching the clock makes you anxious, turn it facing away from you so you cannot see the time. · If you worry when you lie down, start a worry book. Well before bedtime, write down your worries, and then set the book and your concerns aside. · Try meditation or other relaxation techniques before you go to bed. · If you cannot fall asleep, get up and go to another room until you feel sleepy. Do something relaxing. Repeat your bedtime routine before you go to bed again. · Make your house quiet and calm about an hour before bedtime. Turn down the lights, turn off the TV, log off the computer, and turn down the volume on music. This can help you relax after a busy day. Drowsy Driving: The [employer] cites drowsiness as a causing factor in more than 560,390 police reported crashes annually, resulting in 76,000 injuries and 1,500 deaths. Other surveys suggest 55% of people polled have driven while drowsy in the past year, 23% had fallen asleep but not crashed, 3% crashed, and 2% had and accident due to drowsy driving. Who is at risk? Young Drivers: One study of drowsy driving accidents states that 55% of the drivers were under 25 years. Of those, 75% were male. Shift Workers and Travelers: People who work overnight or travel across time zones frequently are at higher risk of experiencing Circadian Rhythm Disorders. They are trying to work and function when their body is programed to sleep. Sleep Deprived: Lack of sleep has a serious impact on your ability to pay attention or focus on a task. Consistently getting less than the average of 8 hours your body needs creates partial or cumulative sleep deprivation. Untreated Sleep Disorders: Sleep Apnea, Narcolepsy, R.L.S., and other sleep disorders (untreated) prevent a person from getting enough restful sleep. This leads to excessive daytime sleepiness and increases the risk for drowsy driving accidents by up to 7 times.  
Medications / Alcohol: Even over the counter medications can cause drowsiness. Medications that impair a drivers attention should have a warning label. Alcohol naturally makes you sleepy and on its own can cause accidents. Combined with excessive drowsiness its effects are amplified. Signs of Drowsy Driving: * You don't remember driving the last few miles * You may drift out of your nils * You are unable to focus and your thoughts wander * You may yawn more often than normal 
 * You have difficulty keeping your eyes open / nodding off * Missing traffic signs, speeding, or tailgating Prevention-  
Good sleep hygiene, lifestyle and behavioral choices have the most impact on drowsy driving. There is no substitute for sleep and the average person requires 8 hours nightly. If you find yourself driving drowsy, stop and sleep. Consider the sleep hygiene tips provided during your visit as well. Medication Refill Policy: Refills for all medications require 1 week advance notice. Please have your pharmacy fax a refill request. We are unable to fax, or call in \"controled substance\" medications and you will need to pick these prescriptions up from our office. Viamedia Activation Thank you for requesting access to Viamedia. Please follow the instructions below to securely access and download your online medical record. Viamedia allows you to send messages to your doctor, view your test results, renew your prescriptions, schedule appointments, and more. How Do I Sign Up? 1. In your internet browser, go to [email or web address]. 2. Click on the First Time User? Click Here link in the Sign In box. You will see the New Member Sign Up page. 3. Enter your Viamedia Access Code exactly as it appears below. You will not need to use this code after youve completed the sign-up process. If you do not sign up before the expiration date, you must request a new code. Viamedia Access Code:  Activation code not generated Current Flinja Status: Active (This is the date your Flinja access code will ) 4. Enter the last four digits of your Social Security Number (xxxx) and Date of Birth (mm/dd/yyyy) as indicated and click Submit. You will be taken to the next sign-up page. 5. Create a Banter!t ID. This will be your Banter!t login ID and cannot be changed, so think of one that is secure and easy to remember. 6. Create a Flinja password. You can change your password at any time. 7. Enter your Password Reset Question and Answer. This can be used at a later time if you forget your password. 8. Enter your e-mail address. You will receive e-mail notification when new information is available in [address] 9. Click Sign Up. You can now view and download portions of your medical record. 10. Click the Download Summary menu link to download a portable copy of your medical information. Additional Information If you have questions, please call [phone]. Remember, Flinja is NOT to be used for urgent needs. For medical emergencies, dial 911.

## 2021-03-26 NOTE — PROGRESS NOTES
[address] [facility] [place]. [place], [address]   Tel.  [phone]   Fax. 100 [facility] 60   [place], [address] [facility]   Tel.  [phone]   Fax. [phone] [address]    Tel.  [phone]   Fax. [phone]     Abril Yost (: 1990) is a 26 y.o. female, established patient, seen for positive airway pressure follow-up, she was last seen by me on 3/27/2020,  previously  seen by Dr. Marciano Red 2017, prior notes reviewed in detail.   In lab sleep test 2017 showed AHI of 8.1/hr with a lowest SaO2 of 89%.     ASSESSMENT/PLAN:    ICD-10-CM ICD-9-CM    1. YAEL (obstructive sleep apnea)  G47.33 327.23 AMB SUPPLY ORDER   2. Adult BMI 39.0-39.9 kg/sq m  Z68.39 V85.39        AHI = 8.1(2017). On APAP :  4-20 cmH2O. Set up 2019. She is not compliant with PAP therapy although when used PAP shows benefit to the patient and remains necessary for control of her sleep apnea. There is continued clinical benefit from the hours of use. She has been in the process of moving and getting work schedule adjusted which has affected ability to use device, she is ready to restart consistent use. Follow-up and Dispositions    · Return in about 6 weeks (around 2021). 1. Sleep Apnea -  Change pressure to APAP 7-15 cm H2O. Changes made by provider in  [employer] [place] system and [facility] to [facility]. She will start using device daily, even if she is only sleeping a few hours between work shifts. Orders Placed This Encounter    AMB SUPPLY ORDER     Diagnosis: (G47.33) YAEL (obstructive sleep apnea)  (primary encounter diagnosis)     Pressure change APAP to 7-15 cmH2O. Changes made in sleep lab. ENRICO Hernandez; NPI: [id number]    Electronically signed.  Date:- 21         * Counseling was provided regarding the importance of regular PAP use with emphasis on ensuring sufficient total sleep time, proper sleep hygiene, and safe driving. * Re-enforced proper and regular cleaning for the device. * She was asked to contact our office for any problems regarding PAP therapy. 2. Recommended a dedicated weight loss program through appropriate diet and exercise regimen as significant weight reduction has been shown to reduce severity of obstructive sleep apnea. SUBJECTIVE/OBJECTIVE:    She  is seen today for follow up on PAP device and reports some problems using the device. The following concerns identified:    Drowsiness no Problems exhaling no   Snoring no Forget to put on no   Mask Comfortable yes Can't fall asleep no   Dry Mouth no Mask falls off no   Air Leaking no Frequent awakenings no       She admits that her sleep has improved on PAP therapy using nasal pillows mask and heated tubing. Review of device download indicated:  Auto pressure: 4-20 cmH2O; Max Pressure: 16.2 cmH2O;  95th percentile Pressure: 13.8 cmH2O   95th Percentile Leak: 4.5 L/Min     % Used Days >= 4 hours: 10.  Avg hours used:  5:25. Therapy Apnea Index averaged over PAP use: 11.3 /hr. [place] Sleepiness Score: 9 and Modified F.O.S.Q. Score Total / 2: 13 which reflects improved sleep quality over therapy time. Sleep Review of Systems: notable for Negative difficulty falling asleep; Positive awakenings at night; Negative early morning headaches; Negative memory problems; Negative concentration issues; Negative chest pain; Negative shortness of breath; Negative significant joint pain at night; Negative significant muscle pain at night; Negative rashes or itching; Negative heartburn; Negative significant mood issues; occasional afternoon naps per week    Vitals reported by patient     Patient-Reported Vitals 3/26/2021   Patient-Reported Weight 230      Calculated BMI 37    Physical Exam completed by visual and auditory observation of patient with verbal input from patient.     General:   Alert, oriented, not in acute distress Eyes:  Anicteric Sclerae; no obvious strabismus   Nose:  No obvious nasal septum deviation    Neck:   Midline trachea, no visible mass   Chest/Lungs:  Respiratory effort normal, no visualized signs of difficulty breathing or respiratory distress   CVS:  No JVD   Extremities:  No obvious rashes noted on face, neck, or hands   Neuro:  No facial asymmetry, no focal deficits; no obvious tremor    Psych:  Normal affect,  normal countenance     Farzad Dura is being evaluated by a Virtual Visit (video visit) encounter to address concerns as mentioned above. A caregiver was present when appropriate. Due to this being a TeleHealth encounter (During [facility] public health emergency), evaluation of the following organ systems was limited: Vitals/Constitutional/EENT/Resp/CV/GI//MS/Neuro/Skin/Heme-Lymph-Imm. Pursuant to the emergency declaration under the [address] [place] [address], [address] [place] [address] authority and the [employer] Act, this Virtual Visit was conducted with patient's (and/or legal guardian's) consent, to reduce the patient's risk of exposure to COVID-19 and provide necessary medical care. Patient identification was verified at the start of the visit: YES using name and date of birth. Patient's phone number [phone] (cell) was confirmed for accuracy. She gives permission for messages regarding results and appointments to be left at that number. Services were provided through a video synchronous discussion virtually to substitute for in-person clinic visit. I was in the office while conducting this encounter, patient located at their home or alternate location of their choice. An electronic signature was used to authenticate this note.     -- Beth Tobin NP, [facility]  03/26/21

## 2021-04-30 ENCOUNTER — TELEPHONE (OUTPATIENT)
Dept: SLEEP MEDICINE | Age: 31
End: 2021-04-30

## 2021-04-30 NOTE — TELEPHONE ENCOUNTER
Keila Rosenberg (: 1990) last seen by me on 3/26/2021, previously seen by Dr. Amos Mitchell 2017, prior notes reviewed in detail.   In lab sleep test 2017 showed AHI of 8.1/hr with a lowest SaO2 of 89%.     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  She is a [facility] . She also is requesting [employer] paperwork to be completed that states her sleep apnea does not prevent her from working. Patient has resumed PAP usage, recent download shows sleep apnea well controlled (AHI 1.7/hr) with usage meeting compliance requirements (80% > 4 hours).     Patient has been advised to continue PAP therapy consistently to prevent daytime sleepiness with emphasis on ensuring sufficient total sleep time, proper sleep hygiene, and safe driving.       Eduardo Celaya NP, [facility]  21

## 2021-05-07 ENCOUNTER — VIRTUAL VISIT (OUTPATIENT)
Dept: SLEEP MEDICINE | Age: 31
End: 2021-05-07
Payer: COMMERCIAL

## 2021-05-07 ENCOUNTER — TELEPHONE (OUTPATIENT)
Dept: SLEEP MEDICINE | Age: 31
End: 2021-05-07

## 2021-05-07 ENCOUNTER — DOCUMENTATION ONLY (OUTPATIENT)
Dept: SLEEP MEDICINE | Age: 31
End: 2021-05-07

## 2021-05-07 DIAGNOSIS — R03.0 ELEVATED BLOOD PRESSURE READING IN OFFICE WITHOUT DIAGNOSIS OF HYPERTENSION: ICD-10-CM

## 2021-05-07 DIAGNOSIS — G47.33 OSA (OBSTRUCTIVE SLEEP APNEA): Primary | ICD-10-CM

## 2021-05-07 PROCEDURE — 99213 OFFICE O/P EST LOW 20 MIN: CPT | Performed by: NURSE PRACTITIONER

## 2021-05-07 NOTE — PATIENT INSTRUCTIONS
[address] [address] [place]. [address] [facility], [address] Tel.  [phone] Fax. 100 [facility] 60 [place], [address] [facility] Tel.  [phone] Fax. [phone] [address]ie Cazares Tel.  [phone] Fax. [phone] Learning About CPAP for Sleep Apnea What is CPAP? CPAP is a small machine that you use at home every night while you sleep. It increases air pressure in your throat to keep your airway open. When you have sleep apnea, this can help you sleep better so you feel much better. CPAP stands for \"continuous positive airway pressure. \" The CPAP machine will have one of the following: · A mask that covers your nose and mouth · Prongs that fit into your nose · A mask that covers your nose only, the most common type. This type is called NCPAP. The N stands for \"nasal.\" Why is it done? CPAP is usually the best treatment for obstructive sleep apnea. It is the first treatment choice and the most widely used. Your doctor may suggest CPAP if you have: · Moderate to severe sleep apnea. · Sleep apnea and coronary artery disease (CAD) or heart failure. How does it help? · CPAP can help you have more normal sleep, so you feel less sleepy and more alert during the daytime. · CPAP may help keep heart failure or other heart problems from getting worse. · NCPAP may help lower your blood pressure. · If you use CPAP, your bed partner may also sleep better because you are not snoring or restless. What are the side effects? Some people who use CPAP have: · A dry or stuffy nose and a sore throat. · Irritated skin on the face. · Sore eyes. · Bloating. If you have any of these problems, work with your doctor to fix them. Here are some things you can try: · Be sure the mask or nasal prongs fit well. · See if your doctor can adjust the pressure of your CPAP. · If your nose is dry, try a humidifier.  
· If your nose is runny or stuffy, try decongestant medicine or a steroid nasal spray. If these things do not help, you might try a different type of machine. Some machines have air pressure that adjusts on its own. Others have air pressures that are different when you breathe in than when you breathe out. This may reduce discomfort caused by too much pressure in your nose. Where can you learn more? Go to Emme [email or web address].be Enter Q982 in the search box to learn more about \"Learning About CPAP for Sleep Apnea. \"  
© 9081-0524 Healthwise, Incorporated. Care instructions adapted under license by [facility] [employer] (which disclaims liability or warranty for this information). This care instruction is for use with your licensed healthcare professional. If you have questions about a medical condition or this instruction, always ask your healthcare professional. [address] any warranty or liability for your use of this information. Content Version: 3.9.81905; Last Revised: January 11, 2010 PROPER SLEEP HYGIENE What to avoid · Do not have drinks with caffeine, such as coffee or black tea, for 8 hours before bed. · Do not smoke or use other types of tobacco near bedtime. Nicotine is a stimulant and can keep you awake. · Avoid drinking alcohol late in the evening, because it can cause you to wake in the middle of the night. · Do not eat a big meal close to bedtime. If you are hungry, eat a light snack. · Do not drink a lot of water close to bedtime, because the need to urinate may wake you up during the night. · Do not read or watch TV in bed. Use the bed only for sleeping and sexual activity. What to try · Go to bed at the same time every night, and wake up at the same time every morning. Do not take naps during the day. · Keep your bedroom quiet, dark, and cool. · Get regular exercise, but not within 3 to 4 hours of your bedtime. Lysbeth Carrel · Sleep on a comfortable pillow and mattress.  
· If watching the clock makes you anxious, turn it facing away from you so you cannot see the time. · If you worry when you lie down, start a worry book. Well before bedtime, write down your worries, and then set the book and your concerns aside. · Try meditation or other relaxation techniques before you go to bed. · If you cannot fall asleep, get up and go to another room until you feel sleepy. Do something relaxing. Repeat your bedtime routine before you go to bed again. · Make your house quiet and calm about an hour before bedtime. Turn down the lights, turn off the TV, log off the computer, and turn down the volume on music. This can help you relax after a busy day. Drowsy Driving: The [employer] cites drowsiness as a causing factor in more than 082,788 police reported crashes annually, resulting in 76,000 injuries and 1,500 deaths. Other surveys suggest 55% of people polled have driven while drowsy in the past year, 23% had fallen asleep but not crashed, 3% crashed, and 2% had and accident due to drowsy driving. Who is at risk? Young Drivers: One study of drowsy driving accidents states that 55% of the drivers were under 25 years. Of those, 75% were male. Shift Workers and Travelers: People who work overnight or travel across time zones frequently are at higher risk of experiencing Circadian Rhythm Disorders. They are trying to work and function when their body is programed to sleep. Sleep Deprived: Lack of sleep has a serious impact on your ability to pay attention or focus on a task. Consistently getting less than the average of 8 hours your body needs creates partial or cumulative sleep deprivation. Untreated Sleep Disorders: Sleep Apnea, Narcolepsy, R.L.S., and other sleep disorders (untreated) prevent a person from getting enough restful sleep. This leads to excessive daytime sleepiness and increases the risk for drowsy driving accidents by up to 7 times.  
Medications / Alcohol: Even over the counter medications can cause drowsiness. Medications that impair a drivers attention should have a warning label. Alcohol naturally makes you sleepy and on its own can cause accidents. Combined with excessive drowsiness its effects are amplified. Signs of Drowsy Driving: * You don't remember driving the last few miles * You may drift out of your nils * You are unable to focus and your thoughts wander * You may yawn more often than normal 
 * You have difficulty keeping your eyes open / nodding off * Missing traffic signs, speeding, or tailgating Prevention-  
Good sleep hygiene, lifestyle and behavioral choices have the most impact on drowsy driving. There is no substitute for sleep and the average person requires 8 hours nightly. If you find yourself driving drowsy, stop and sleep. Consider the sleep hygiene tips provided during your visit as well. Medication Refill Policy: Refills for all medications require 1 week advance notice. Please have your pharmacy fax a refill request. We are unable to fax, or call in \"controled substance\" medications and you will need to pick these prescriptions up from our office. [employer] Activation Thank you for requesting access to [employer]. Please follow the instructions below to securely access and download your online medical record. [employer] allows you to send messages to your doctor, view your test results, renew your prescriptions, schedule appointments, and more. How Do I Sign Up? 1. In your internet browser, go to [email or web address]. 2. Click on the First Time User? Click Here link in the Sign In box. You will see the New Member Sign Up page. 3. Enter your [employer] Access Code exactly as it appears below. You will not need to use this code after youve completed the sign-up process. If you do not sign up before the expiration date, you must request a new code. [employer] Access Code:  Activation code not generated Current soup.me Status: Active (This is the date your soup.me access code will ) 4. Enter the last four digits of your Social Security Number (xxxx) and Date of Birth (mm/dd/yyyy) as indicated and click Submit. You will be taken to the next sign-up page. 5. Create a Inst[employer] ID. This will be your soup.me login ID and cannot be changed, so think of one that is secure and easy to remember. 6. Create a soup.me password. You can change your password at any time. 7. Enter your Password Reset Question and Answer. This can be used at a later time if you forget your password. 8. Enter your e-mail address. You will receive e-mail notification when new information is available in [address] 9. Click Sign Up. You can now view and download portions of your medical record. 10. Click the Download Summary menu link to download a portable copy of your medical information. Additional Information If you have questions, please call [phone]. Remember, soup.me is NOT to be used for urgent needs. For medical emergencies, dial 911.

## 2021-05-07 NOTE — PROGRESS NOTES
[address] [facility] [address] [place]. [place], [address]   Tel.  [phone]   Fax. [address] [facility] [address] [facility] [address]   [address] [facility] [address], [address] [facility]   Tel.  [phone]   Fax. [phone] [address]   Tel.  [phone]   Fax. [phone]     Eyad Kerr (: 1990) is a 26 y.o. female, established patient, seen for positive airway pressure follow-up, she was last seen by me on 3/26/2021, previously  seen by Dr. Carlos Zamora 2017, prior notes reviewed in detail.   In lab sleep test 2017 showed AHI of 8.1/hr with a lowest SaO2 of 89%. ASSESSMENT/PLAN:    ICD-10-CM ICD-9-CM    1. YAEL (obstructive sleep apnea)  G47.33 327.23 AMB SUPPLY ORDER   2. Adult BMI 37.0-37.9 kg/sq m  Z68.37 V85.37        AHI = 8.1(2017). On APAP :  7-15 cmH2O. Set up 2019. She is adherent with PAP therapy and PAP continues to benefit patient and remains necessary for control of her sleep apnea. Follow-up and Dispositions    · Return in about 1 year (around 2022). 1. Sleep Apnea - Continue on current pressures    *  Supplies ordered - nasal pillows mask and heated tubing    Orders Placed This Encounter    AMB SUPPLY ORDER     Diagnosis: (G47.33) YAEL (obstructive sleep apnea)  (primary encounter diagnosis)     Replacement Supplies for Positive Airway Pressure Therapy Device:   Duration of need: 99 months.  Nasal Pillows (Replace) 2 per month.  Nasal Interface Mask 1 every 3 months.  Pos Airway pressure chin strap   Headgear 1 every 6 months.  Tubing with heating element 1 every 3 months.  Filter(s) Disposable 2 per month.  Filter(s) Non-Disposable 1 every 6 months. .   [address] [facility] for Humidifier (Replace) 1 every 6 months. ENRICO Rodriguez; NPI: [id number]    Electronically signed.  Date:- 21       * Counseling was provided regarding the importance of regular PAP use with emphasis on ensuring sufficient total sleep time, proper sleep hygiene, and safe driving. * Re-enforced proper and regular cleaning for the device. * She was asked to contact our office for any problems regarding PAP therapy. 2. Hypertension -  readings have been elevated recently, she does not have PCP, we discussed that she may need to start medication if pressures continue elevated, she will follow up with OB/GYN or establish with PCP. I have reviewed the relationship between hypertension as it relates to sleep-disordered breathing. 3. Encouraged continued weight management program through appropriate diet and exercise regimen as significant weight reduction has been shown to reduce severity of obstructive sleep apnea. SUBJECTIVE/OBJECTIVE:    She  is seen today for follow up on PAP device and reports no problems using the device. The following concerns reviewed:    Drowsiness no Problems exhaling no   Snoring no Forget to put on no   Mask Comfortable yes Can't fall asleep no   Dry Mouth no Mask falls off no   Air Leaking no Frequent awakenings no       She admits that her sleep has improved on PAP therapy using nasal pillows mask and heated tubing. She notes that she is doing well on device and has been sleeping more (working less hours) which has helped. 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 Review of device download indicated:  Auto pressure: 7-15 cmH2O; Max Pressure: 11.8 cmH2O;  95th percentile Pressure: 10.7 cmH2O   95th Percentile Leak: 7.8 L/Min     % Used Days >= 4 hours: 80. Avg hours used:  6:14. Therapy Apnea Index averaged over PAP use: 1.7 /hr which reflects improved sleep breathing condition. [place] Sleepiness Score: 7 and Modified F.O.S.Q. Score Total / 2: 12 which reflects improved sleep quality over therapy time. Prior ESS 9. Sleep Review of Systems: notable for Negative difficulty falling asleep;  Positive awakenings at night; Negative early morning headaches; Negative memory problems; Negative concentration issues; Negative chest pain; Negative shortness of breath; Negative significant joint pain at night; Negative significant muscle pain at night; Negative rashes or itching; Negative heartburn; Negative significant mood issues; occasional naps    Vitals reported by patient   Patient-Reported Vitals 5/7/2021   Patient-Reported Weight 235   Patient-Reported Systolic  627   Patient-Reported Diastolic 86      Calculated BMI 37    Physical Exam completed by visual and auditory observation of patient with verbal input from patient. General:   Alert, oriented, not in acute distress   Eyes:  Anicteric Sclerae; no obvious strabismus   Nose:  No obvious nasal septum deviation    Neck:   Midline trachea, no visible mass   Chest/Lungs:  Respiratory effort normal, no visualized signs of difficulty breathing or respiratory distress   CVS:  No JVD   Extremities:  No obvious rashes noted on face, neck, or hands   Neuro:  No facial asymmetry, no focal deficits; no obvious tremor    Psych:  Normal affect,  normal countenance     Janet Kinney is being evaluated by a Virtual Visit (video visit) encounter to address concerns as mentioned above. A caregiver was present when appropriate. Due to this being a TeleHealth encounter ([facility]), evaluation of the following organ systems was limited: Vitals/Constitutional/EENT/Resp/CV/GI//MS/Neuro/Skin/Heme-Lymph-Imm. Pursuant to the emergency declaration under the [address] [place] [address], [address] [place] [address] authority and the [employer] Act, this Virtual Visit was conducted with patient's (and/or legal guardian's) consent, to reduce the patient's risk of exposure to COVID-19 and provide necessary medical care. Patient identification was verified at the start of the visit: YES using photo in chart .  Patient's phone number [phone] (cell) was confirmed for accuracy. She gives permission for messages regarding results and appointments to be left at that number. Services were provided through a video synchronous discussion virtually to substitute for in-person clinic visit. I was in the office while conducting this encounter, patient located at their home or alternate location of their choice. An electronic signature was used to authenticate this note.     -- Marcio Lanier NP, [facility]  05/07/21

## 2021-06-23 ENCOUNTER — OFFICE VISIT (OUTPATIENT)
Dept: INTERNAL MEDICINE CLINIC | Age: 31
End: 2021-06-23
Payer: COMMERCIAL

## 2021-06-23 VITALS
BODY MASS INDEX: 37.45 KG/M2 | HEIGHT: 66 IN | DIASTOLIC BLOOD PRESSURE: 91 MMHG | HEART RATE: 98 BPM | WEIGHT: 233 LBS | SYSTOLIC BLOOD PRESSURE: 144 MMHG | OXYGEN SATURATION: 98 % | RESPIRATION RATE: 19 BRPM | TEMPERATURE: 98.4 F

## 2021-06-23 DIAGNOSIS — M54.9 ACUTE BACK PAIN, UNSPECIFIED BACK LOCATION, UNSPECIFIED BACK PAIN LATERALITY: ICD-10-CM
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
## 2021-06-23 DIAGNOSIS — N92.0 MENORRHAGIA WITH REGULAR CYCLE: ICD-10-CM

## 2021-06-23 DIAGNOSIS — D64.9 ANEMIA, UNSPECIFIED TYPE: ICD-10-CM

## 2021-06-23 LAB — HGB BLD-MCNC: 6.9 G/DL

## 2021-06-23 PROCEDURE — 99214 OFFICE O/P EST MOD 30 MIN: CPT | Performed by: INTERNAL MEDICINE

## 2021-06-23 PROCEDURE — 85018 HEMOGLOBIN: CPT | Performed by: INTERNAL MEDICINE

## 2021-06-23 RX ORDER — IBUPROFEN 800 MG/1
800 TABLET ORAL
Qty: 60 TABLET | Refills: 1 | Status: SHIPPED | OUTPATIENT
Start: 2021-06-23 | End: 2021-07-20 | Stop reason: DRUGHIGH

## 2021-06-23 RX ORDER — FERROUS SULFATE 325(65) MG
325 TABLET, DELAYED RELEASE (ENTERIC COATED) ORAL 2 TIMES DAILY
Qty: 60 TABLET | Refills: 5 | Status: SHIPPED | OUTPATIENT
Start: 2021-06-23 | End: 2022-10-07 | Stop reason: SDUPTHER

## 2021-06-23 RX ORDER — METHOCARBAMOL 750 MG/1
TABLET, FILM COATED ORAL
COMMUNITY
Start: 2021-06-16 | End: 2021-07-02 | Stop reason: ALTCHOICE

## 2021-06-23 NOTE — LETTER
NOTIFICATION RETURN TO WORK / SCHOOL    6/23/2021 10:03 AM    Ms. Lubna Paiz  [address] [facility] [address]      To Whom It May Concern:    Lubna Paiz is currently under the care of Larry Stevenson. She will return to work/school on: 7/5/2021. If there are questions or concerns please have the patient contact our office.         Sincerely,      Jonahtan Hidalgo MD
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
## 2021-06-28 ENCOUNTER — HOSPITAL ENCOUNTER (OUTPATIENT)
Dept: PHYSICAL THERAPY | Age: 31
Discharge: HOME | End: 2021-06-28
Payer: COMMERCIAL

## 2021-06-28 PROCEDURE — 97530 THERAPEUTIC ACTIVITIES: CPT | Performed by: PHYSICAL THERAPIST

## 2021-06-28 PROCEDURE — 97110 THERAPEUTIC EXERCISES: CPT | Performed by: PHYSICAL THERAPIST

## 2021-06-28 PROCEDURE — 97161 PT EVAL LOW COMPLEX 20 MIN: CPT | Performed by: PHYSICAL THERAPIST
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
## 2021-07-02 ENCOUNTER — HOSPITAL ENCOUNTER (OUTPATIENT)
Dept: PHYSICAL THERAPY | Age: 31
Discharge: HOME | End: 2021-07-02

## 2021-07-02 ENCOUNTER — OFFICE VISIT (OUTPATIENT)
Dept: INTERNAL MEDICINE CLINIC | Age: 31
End: 2021-07-02
Payer: COMMERCIAL

## 2021-07-02 VITALS
HEIGHT: 66 IN | WEIGHT: 233.3 LBS | SYSTOLIC BLOOD PRESSURE: 146 MMHG | TEMPERATURE: 98.2 F | HEART RATE: 93 BPM | RESPIRATION RATE: 19 BRPM | DIASTOLIC BLOOD PRESSURE: 92 MMHG | BODY MASS INDEX: 37.49 KG/M2 | OXYGEN SATURATION: 98 %

## 2021-07-02 DIAGNOSIS — M79.662 PAIN OF LEFT CALF: ICD-10-CM

## 2021-07-02 DIAGNOSIS — Z86.718 HISTORY OF DVT IN ADULTHOOD: Primary | ICD-10-CM

## 2021-07-02 PROCEDURE — 99214 OFFICE O/P EST MOD 30 MIN: CPT | Performed by: INTERNAL MEDICINE

## 2021-07-02 NOTE — PROGRESS NOTES
Chief Complaint   Patient presents with    Leg Pain     left x 2 weeks denies fall injuries     1. Have you been to the ER, urgent care clinic since your last visit? Hospitalized since your last visit? No    2. Have you seen or consulted any other health care providers outside of the [address] [place] [address] since your last visit? Include any pap smears or colon screening.  No

## 2021-07-02 NOTE — PROGRESS NOTES
Patient's cervical pain has improved. Upon presenting to clinic she continues to report LLE pain. Her current symptoms score a moderate risk for DVT based on Wells Prediction Rule. Patient was referred to PCP office and instructed to see ER/Urgent care if unable to be seen with PCP.
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
## 2021-07-14 ENCOUNTER — HOSPITAL ENCOUNTER (EMERGENCY)
Age: 31
Discharge: HOME | End: 2021-07-14
Attending: EMERGENCY MEDICINE | Admitting: EMERGENCY MEDICINE
Payer: COMMERCIAL

## 2021-07-14 ENCOUNTER — HOSPITAL ENCOUNTER (OUTPATIENT)
Dept: ULTRASOUND IMAGING | Age: 31
Discharge: HOME | End: 2021-07-14
Payer: COMMERCIAL

## 2021-07-14 ENCOUNTER — APPOINTMENT (OUTPATIENT)
Dept: CT IMAGING | Age: 31
End: 2021-07-14
Attending: PHYSICIAN ASSISTANT
Payer: COMMERCIAL

## 2021-07-14 VITALS
TEMPERATURE: 98.4 F | SYSTOLIC BLOOD PRESSURE: 150 MMHG | WEIGHT: 233 LBS | BODY MASS INDEX: 37.45 KG/M2 | RESPIRATION RATE: 15 BRPM | DIASTOLIC BLOOD PRESSURE: 94 MMHG | HEART RATE: 88 BPM | HEIGHT: 66 IN | OXYGEN SATURATION: 100 %

## 2021-07-14 DIAGNOSIS — Z86.718 HISTORY OF DVT IN ADULTHOOD: ICD-10-CM

## 2021-07-14 DIAGNOSIS — I82.432 ACUTE DEEP VEIN THROMBOSIS (DVT) OF POPLITEAL VEIN OF LEFT LOWER EXTREMITY (HCC): Primary | ICD-10-CM

## 2021-07-14 DIAGNOSIS — M79.662 PAIN OF LEFT CALF: ICD-10-CM

## 2021-07-14 LAB
ANION GAP BLD CALC-SCNC: 12 MMOL/L (ref 10–20)
CA-I BLD-MCNC: 1.24 MMOL/L (ref 1.12–1.32)
CHLORIDE BLD-SCNC: 104 MMOL/L (ref 98–107)
CO2 BLD-SCNC: 28.1 MMOL/L (ref 21–32)
CREAT BLD-MCNC: 1.01 MG/DL (ref 0.6–1.3)
GLUCOSE BLD-MCNC: 86 MG/DL (ref 65–100)
INR PPP: 1 (ref 0.9–1.1)
POTASSIUM BLD-SCNC: 3.8 MMOL/L (ref 3.5–5.1)
PROTHROMBIN TIME: 10.7 SEC (ref 9–11.1)
SERVICE CMNT-IMP: NORMAL
SODIUM BLD-SCNC: 143 MMOL/L (ref 136–145)
TROPONIN I SERPL-MCNC: <0.05 NG/ML

## 2021-07-14 PROCEDURE — 84484 ASSAY OF TROPONIN QUANT: CPT

## 2021-07-14 PROCEDURE — 93971 EXTREMITY STUDY: CPT

## 2021-07-14 PROCEDURE — 85610 PROTHROMBIN TIME: CPT

## 2021-07-14 PROCEDURE — 99285 EMERGENCY DEPT VISIT HI MDM: CPT

## 2021-07-14 PROCEDURE — 80047 BASIC METABLC PNL IONIZED CA: CPT

## 2021-07-14 PROCEDURE — 74011250637 HC RX REV CODE- 250/637: Performed by: PHYSICIAN ASSISTANT

## 2021-07-14 PROCEDURE — 71275 CT ANGIOGRAPHY CHEST: CPT

## 2021-07-14 PROCEDURE — 36415 COLL VENOUS BLD VENIPUNCTURE: CPT

## 2021-07-14 PROCEDURE — 93005 ELECTROCARDIOGRAM TRACING: CPT

## 2021-07-14 PROCEDURE — 74011000636 HC RX REV CODE- 636: Performed by: EMERGENCY MEDICINE

## 2021-07-14 RX ORDER — RIVAROXABAN 15 MG-20MG
KIT ORAL
Qty: 1 DOSE PACK | Refills: 0 | Status: SHIPPED | OUTPATIENT
Start: 2021-07-14 | End: 2021-07-20 | Stop reason: DRUGHIGH

## 2021-07-14 RX ADMIN — IOPAMIDOL 65 ML: 755 INJECTION, SOLUTION INTRAVENOUS at 19:35

## 2021-07-14 RX ADMIN — RIVAROXABAN 15 MG: 15 TABLET, FILM COATED ORAL at 20:21

## 2021-07-14 NOTE — ED PROVIDER NOTES
EMERGENCY DEPARTMENT HISTORY AND PHYSICAL EXAM      Date: 7/14/2021  Patient Name: Regla Medina    History of Presenting Illness     Chief Complaint   Patient presents with    Blood Clot     L leg blood clot, got an US today and told to come here by PCP       History Provided By: Patient    HPI: Regla Medina, 26 y.o. female with a personal history of DVT and pulmonary embolism, ovarian cancer, hypertension and others presents ambulatory to the ED with cc of at least 2 weeks of moderately severe and essentially constant pain of the left calf. Since that time she has had episodes of chest pain and shortness of breath. She tells me she had a DVT in the past and took Xarelto for 6 months. She is presently not taking any oral anticoagulant. She was seen by her primary care about 2 weeks ago and a duplex ultrasound was ordered. That study was performed today which demonstrated an acute thrombus of the left popliteal vein. She presents to the emergency department for evaluation and treatment of an acute DVT. She is been well lately without fever. Is been no nausea, vomiting or diarrhea. There are no other complaints, changes, or physical findings at this time. PCP: Melinda Arechiga MD    Current Outpatient Medications   Medication Sig Dispense Refill    rivaroxaban (Xarelto DVT-PE Treat 30d Start) 15 mg (42)- 20 mg (9) DsPk Take one 15 mg tablet twice a day with food for the first 21 days. Then, take one 20 mg tablet once a day with food for 9 days. 1 Dose Pack 0    ferrous sulfate (IRON) 325 mg (65 mg iron) EC tablet Take 1 Tablet by mouth two (2) times a day. Take with orange juice or vit c tab 60 Tablet 5    ibuprofen (MOTRIN) 800 mg tablet Take 1 Tablet by mouth every eight (8) hours as needed for Pain.  60 Tablet 1     Past History     Past Medical History:  Past Medical History:   Diagnosis Date    Cancer ([facility] [address]     ovarian     Hypertension     with pregnancy    Ill-defined condition MS    Ill-defined condition     TIMOTHY 1 with last pap       Past Surgical History:  No past surgical history on file. Family History:  Family History   Problem Relation Age of Onset    Kidney Disease Mother     Cancer Father        Social History:  Social History     Tobacco Use    Smoking status: Current Some Day Smoker     Packs/day: 0.25     Years: 10.00     Pack years: 2.50     Types: Cigars    Smokeless tobacco: Former User    Tobacco comment: black and milds occ   Vaping Use    Vaping Use: Never used   Substance Use Topics    Alcohol use: Yes     Alcohol/week: 3.0 standard drinks     Types: 3 Shots of liquor per week     Comment: occ    Drug use: Not Currently     Comment: occ       Allergies: Allergies   Allergen Reactions    Shellfish Derived Swelling     Review of Systems   Review of Systems   Constitutional: Negative for fatigue and fever. HENT: Negative for ear pain and sore throat. Eyes: Negative for pain, redness and visual disturbance. Respiratory: Negative for cough and shortness of breath. Cardiovascular: Positive for leg swelling (Left calf pain and mild swelling). Negative for chest pain and palpitations. Gastrointestinal: Negative for abdominal pain, nausea and vomiting. Genitourinary: Negative for dysuria, frequency and urgency. Musculoskeletal: Negative for back pain, gait problem, neck pain and neck stiffness. Skin: Negative for rash and wound. Neurological: Negative for dizziness, weakness, light-headedness, numbness and headaches. Physical Exam   Physical Exam  Vitals and nursing note reviewed. Constitutional:       General: She is not in acute distress. Appearance: She is well-developed. She is not toxic-appearing. HENT:      Head: Normocephalic and atraumatic. Jaw: No trismus. Right Ear: External ear normal.      Left Ear: External ear normal.      Nose: Nose normal.      Mouth/Throat:      Pharynx: Uvula midline.    Eyes:      General: No scleral icterus. Conjunctiva/sclera: Conjunctivae normal.      Pupils: Pupils are equal, round, and reactive to light. Cardiovascular:      Rate and Rhythm: Normal rate and regular rhythm. Pulmonary:      Effort: Pulmonary effort is normal. No tachypnea, accessory muscle usage or respiratory distress. Breath sounds: No decreased breath sounds or wheezing. Abdominal:      Palpations: Abdomen is soft. Tenderness: There is no abdominal tenderness. Musculoskeletal:         General: Normal range of motion. Cervical back: Full passive range of motion without pain and normal range of motion. Left lower leg: Tenderness present. Legs:       Comments:   LEFT LOWER LEG:  There may be mild left lower leg swelling on exam  No obvious redness or bruising  No obvious cordlike palpable varicosities of the posterior calf  There is posterior calf tenderness with palpation   Skin:     Findings: No rash. Neurological:      Mental Status: She is alert and oriented to person, place, and time. She is not disoriented. GCS: GCS eye subscore is 4. GCS verbal subscore is 5. GCS motor subscore is 6. Cranial Nerves: No cranial nerve deficit. Psychiatric:         Speech: Speech normal.       Diagnostic Study Results     Labs -     Recent Results (from the past 12 hour(s))   POC CHEM8    Collection Time: 07/14/21  5:44 PM   Result Value Ref Range    Calcium, ionized (POC) 1.24 1.12 - 1.32 mmol/L    Sodium (POC) 143 136 - 145 mmol/L    Potassium (POC) 3.8 3.5 - 5.1 mmol/L    Chloride (POC) 104 98 - 107 mmol/L    CO2 (POC) 28.1 21 - 32 mmol/L    Anion gap (POC) 12 10 - 20 mmol/L    Glucose (POC) 86 65 - 100 mg/dL    Creatinine (POC) 1.01 0.6 - 1.3 mg/dL    GFRAA, POC >60 >60 ml/min/1.73m2    GFRNA, POC >60 >60 ml/min/1.73m2    Comment Comment Not Indicated.      EKG, 12 LEAD, INITIAL    Collection Time: 07/14/21  6:14 PM   Result Value Ref Range    Ventricular Rate 94 BPM    Atrial Rate 94 BPM P-R Interval 164 ms    QRS Duration 78 ms    Q-T Interval 364 ms    QTC Calculation (Bezet) 455 ms    Calculated P Axis 60 degrees    Calculated R Axis 13 degrees    Calculated T Axis 0 degrees    Diagnosis       Normal sinus rhythm  Minimal voltage criteria for LVH, may be normal variant  When compared with ECG of 12-JUN-2019 22:16,  No significant change was found     TROPONIN I    Collection Time: 07/14/21  6:27 PM   Result Value Ref Range    Troponin-I, Qt. <0.05 <0.05 ng/mL   PROTHROMBIN TIME + INR    Collection Time: 07/14/21  6:27 PM   Result Value Ref Range    INR 1.0 0.9 - 1.1      Prothrombin time 10.7 9.0 - 11.1 sec       Radiologic Studies -   CTA CHEST W OR W WO CONT   Final Result   1. No visualized acute pulmonary embolus. 2. Incidental findings as above. CT Results  (Last 48 hours)               07/14/21 1935  CTA CHEST W OR W WO CONT Final result    Impression:  1. No visualized acute pulmonary embolus. 2. Incidental findings as above. Narrative:  EXAM:  CTA CHEST W OR W WO CONT   INDICATION:  Shortness of breath; DVT of left popliteal vein; history of   pulmonary emboli. Additional history:   COMPARISON: CT of the chest, 6/12/2019 and 7/2/2019. Jim Neighbours TECHNIQUE:    Precontrast  images were obtained to localize the volume for acquisition. Multislice helical CT arteriography was performed from the diaphragm to the   thoracic inlet during uneventful rapid bolus intravenous contrast   administration. Lung and soft tissue windows were generated. Coronal and   sagittal images were generated and 3D post processing consisting of coronal   maximum intensity images was performed. CT dose reduction was achieved through use of a standardized protocol tailored   for this examination and automatic exposure control for dose modulation. Conyers Neighbours FINDINGS:   CHEST:   Chest wall/thoracic inlet: Within normal limits. Thyroid: Within normal limits. Mediastinum/shy:  Within normal limits. Heart/vessels: Chronic filling defect in a subsegmental pulmonary artery in the   right lower lobe. .   Lungs/Pleura: Within normal limits. .   INCIDENTALLY IMAGED ABDOMEN:   Hiatal hernia    . MSK:    Dextroscoliosis of the thoracolumbar junction. .           CXR Results  (Last 48 hours)    None        Medical Decision Making   I am the first provider for this patient. I reviewed the vital signs, available nursing notes, past medical history, past surgical history, family history and social history. Vital Signs-Reviewed the patient's vital signs. Patient Vitals for the past 12 hrs:   Temp Pulse Resp BP SpO2   07/14/21 1859  88 15 (!) 150/94 100 %   07/14/21 1723 98.4 °F (36.9 °C) 95 14 (!) 150/99 100 %       Pulse Oximetry Analysis - 100% on RA    Records Reviewed: Nursing Notes, Old Medical Records, Previous Radiology Studies and Previous Laboratory Studies    Provider Notes (Medical Decision Making):   DDx: DVT, pulmonary embolism,    ED Course:   Initial assessment performed. The patients presenting problems have been discussed, and they are in agreement with the care plan formulated and outlined with them. I have encouraged them to ask questions as they arise throughout their visit. ED Course as of Jul 14 2035   Wed Jul 14, 2021   2828 CTA is negative for pulmonary embolism. Labs and EKG are otherwise normal.  Case discussed with Dr. Triston Fairbanks. Believe reasonable to discharge with a prescription for Xarelto. Will give first dose in the emergency department. Return precautions for chest pain, shortness of breath, abnormal bleeding or any other concerns. [EJ]      ED Course User Index  [EJ] MCKAYLA Lugo       Disposition:  Discharge    PLAN:  1.    Discharge Medication List as of 7/14/2021  8:14 PM      START taking these medications    Details   rivaroxaban (Xarelto DVT-PE Treat 30d Start) 15 mg (42)- 20 mg (9) DsPk Take one 15 mg tablet twice a day with food for the first 21 days. Then, take one 20 mg tablet once a day with food for 9 days. , Normal, Disp-1 Dose Pack, R-0         CONTINUE these medications which have NOT CHANGED    Details   ferrous sulfate (IRON) 325 mg (65 mg iron) EC tablet Take 1 Tablet by mouth two (2) times a day. Take with orange juice or vit c tab, Normal, Disp-60 Tablet, R-5      ibuprofen (MOTRIN) 800 mg tablet Take 1 Tablet by mouth every eight (8) hours as needed for Pain., Normal, Disp-60 Tablet, R-1           2. Follow-up Information     Follow up With Specialties Details Why Contact Info    Christos Baugh MD Internal Medicine Call  PRIMARY CARE: Call tomorrow to schedule follow-up [address] [place] [address] [phone]          Return to ED if worse     Diagnosis     Clinical Impression:   1.  Acute deep vein thrombosis (DVT) of popliteal vein of left lower extremity (HCC)

## 2021-07-15 LAB
ATRIAL RATE: 94 BPM
CALCULATED P AXIS, ECG09: 60 DEGREES
CALCULATED R AXIS, ECG10: 13 DEGREES
CALCULATED T AXIS, ECG11: 0 DEGREES
DIAGNOSIS, 93000: NORMAL
P-R INTERVAL, ECG05: 164 MS
Q-T INTERVAL, ECG07: 364 MS
QRS DURATION, ECG06: 78 MS
QTC CALCULATION (BEZET), ECG08: 455 MS
VENTRICULAR RATE, ECG03: 94 BPM

## 2021-07-15 NOTE — ED NOTES
Barry BLOCK and Desire Chowdary RN reviewed discharge instructions with the patient. The patient verbalized understanding. All questions and concerns were addressed. The patient declined a wheelchair and is discharged ambulatory with instructions and prescriptions in hand. Pt is alert and oriented x 4. Respirations are clear and unlabored.

## 2021-07-20 ENCOUNTER — OFFICE VISIT (OUTPATIENT)
Dept: INTERNAL MEDICINE CLINIC | Age: 31
End: 2021-07-20
Payer: COMMERCIAL

## 2021-07-20 VITALS
HEIGHT: 66 IN | DIASTOLIC BLOOD PRESSURE: 89 MMHG | SYSTOLIC BLOOD PRESSURE: 128 MMHG | WEIGHT: 233 LBS | TEMPERATURE: 98.3 F | BODY MASS INDEX: 37.45 KG/M2 | RESPIRATION RATE: 19 BRPM

## 2021-07-20 DIAGNOSIS — I82.402 RECURRENT ACUTE DEEP VEIN THROMBOSIS (DVT) OF LEFT LOWER EXTREMITY (HCC): Primary | ICD-10-CM

## 2021-07-20 PROCEDURE — 99213 OFFICE O/P EST LOW 20 MIN: CPT | Performed by: INTERNAL MEDICINE

## 2021-07-20 NOTE — PROGRESS NOTES
Chief Complaint   Patient presents with    Blood Clot     f/u     1. Have you been to the ER, urgent care clinic since your last visit? Hospitalized since your last visit? No    2. Have you seen or consulted any other health care providers outside of the [address] [place] [address] since your last visit? Include any pap smears or colon screening.  No

## 2021-07-20 NOTE — PROGRESS NOTES
Gardenia Poole is a 26 y.o. female and presents with Blood Clot (f/u)  . Subjective:    Previous PCP-Dr. Blanche Aguilar  Last appt  w her 7/2020    Pt was referred by PT for left calf pain. Pt has a h/o DVT- presumed to be provoked by OCPs in 2019. Pt mentioned to PT that she has left calf pain x ?time and she was instructed to obtain duplex to r/o DVT. No le edema/sob/palpitations/chest pain   Pts duplex shows NON-occluded left popliteal thrombus (same area as previous clot). Pt was referred to Ed due to chest pain. Ct chest neg PE. Lab Results   Component Value Date/Time    WBC 12.7 (H) 07/02/2019 11:03 AM    Hemoglobin (POC) 6.9 06/23/2021 09:49 AM    Hemoglobin (POC) 13.3 02/15/2015 08:42 PM    HGB 9.2 (L) 07/02/2019 11:03 AM    Hematocrit (POC) 39 02/15/2015 08:42 PM    HCT 30.7 (L) 07/02/2019 11:03 AM    PLATELET 572 (H) 51/49/4680 11:03 AM    MCV 79.7 (L) 07/02/2019 11:03 AM         PMH:H/o DVT 2019- s/p NOAC tx     YAEL on cpap     Obesity-     \"Arryythmia\"    Review of Systems  Review of systems (12) negative, except noted above. Past Medical History:   Diagnosis Date    Cancer ([facility] [address]     ovarian     Hypertension     with pregnancy    Ill-defined condition     MS    Ill-defined condition     TIMOTHY 1 with last pap     History reviewed. No pertinent surgical history. Social History     Socioeconomic History    Marital status: SINGLE     Spouse name: Not on file    Number of children: Not on file    Years of education: Not on file    Highest education level: Not on file   Tobacco Use    Smoking status: Current Some Day Smoker     Packs/day: 0.25     Years: 10.00     Pack years: 2.50     Types: Cigars    Smokeless tobacco: Former User    Tobacco comment: black and milds occ   Vaping Use    Vaping Use: Never used   Substance and Sexual Activity    Alcohol use:  Yes     Alcohol/week: 3.0 standard drinks     Types: 3 Shots of liquor per week     Comment: occ    Drug use: Not Currently     Comment: occ  Sexual activity: Yes     Partners: Male     Birth control/protection: None     Social Determinants of Health     Financial Resource Strain:     Difficulty of Paying Living Expenses:    Food Insecurity:     Worried About Running Out of Food in the Last Year:     [address] in the Last Year:    Transportation Needs:     Lack of Transportation (Medical):  Lack of Transportation (Non-Medical):    Physical Activity:     Days of Exercise per Week:     Minutes of Exercise per Session:    Stress:     Feeling of Stress :    Social Connections:     Frequency of Communication with Friends and Family:     Frequency of Social Gatherings with Friends and Family:     Attends [employer] Services:     Active Member of Clubs or Organizations:     Attends Club or Organization Meetings:     Marital Status:      Family History   Problem Relation Age of Onset    Kidney Disease Mother     Cancer Father        Allergies   Allergen Reactions    Shellfish Derived Swelling       Objective:  Visit Vitals  /89 (BP 1 Location: Left upper arm, BP Patient Position: Sitting, BP Cuff Size: Large adult)   Temp 98.3 °F (36.8 °C) (Temporal)   Resp 19   Ht 5' 6\" (1.676 m)   Wt 233 lb (105.7 kg)   LMP 06/23/2021 (Exact Date)   BMI 37.61 kg/m²     Physical Exam:   General appearance - alert, morbidly obese w soft collar on  Mental status - alert, oriented to person, place, and time  EYE-EOMI  Neck - supple,   Chest - symmetric air entry    Abdomen - obese  Ext-no pedal edema, no clubbing or cyanosis  Skin-Warm and dry.  no hyperpigmentation, vitiligo, or suspicious lesions  Neuro -alert, oriented, normal speech, no focal findings or movement disorder noted        Results for orders placed or performed during the hospital encounter of 07/14/21   TROPONIN I   Result Value Ref Range    Troponin-I, Qt. <0.05 <0.05 ng/mL   PROTHROMBIN TIME + INR   Result Value Ref Range    INR 1.0 0.9 - 1.1      Prothrombin time 10.7 9.0 - 11.1 sec   POC CHEM8   Result Value Ref Range    Calcium, ionized (POC) 1.24 1.12 - 1.32 mmol/L    Sodium (POC) 143 136 - 145 mmol/L    Potassium (POC) 3.8 3.5 - 5.1 mmol/L    Chloride (POC) 104 98 - 107 mmol/L    CO2 (POC) 28.1 21 - 32 mmol/L    Anion gap (POC) 12 10 - 20 mmol/L    Glucose (POC) 86 65 - 100 mg/dL    Creatinine (POC) 1.01 0.6 - 1.3 mg/dL    GFRAA, POC >60 >60 ml/min/1.73m2    GFRNA, POC >60 >60 ml/min/1.73m2    Comment Comment Not Indicated. EKG, 12 LEAD, INITIAL   Result Value Ref Range    Ventricular Rate 94 BPM    Atrial Rate 94 BPM    P-R Interval 164 ms    QRS Duration 78 ms    Q-T Interval 364 ms    QTC Calculation (Bezet) 455 ms    Calculated P Axis 60 degrees    Calculated R Axis 13 degrees    Calculated T Axis 0 degrees    Diagnosis       Normal sinus rhythm  Minimal voltage criteria for LVH, may be normal variant  Nonspecific ST and T wave abnormality  Poor R-wave Progression (consider lead placement or loss of anterior forces)  When compared with ECG of 12-JUN-2019 22:16,  No significant change was found  Confirmed by Meir Lopez MD, Cassie Blunt ([id number]) on 7/15/2021 4:12:35 PM         Assessment/Plan:    ICD-10-CM ICD-9-CM    1. Recurrent acute deep vein thrombosis (DVT) of left lower extremity (HCC)  I82.402 453.40 REFERRAL TO HEMATOLOGY ONCOLOGY      rivaroxaban (Xarelto) 20 mg tab tablet     Orders Placed This Encounter    EMPL [facility] Hem Onc [facility]     Referral Priority:   Routine     Referral Type:   Consultation     Referral Reason:   Specialty Services Required     Referred to Provider:   Heber Meadows MD     Requested Specialty:   Hematology and Oncology     Number of Visits Requested:   1    rivaroxaban (Xarelto) 20 mg tab tablet     Sig: Take 1 Tablet by mouth daily (with dinner). Dispense:  30 Tablet     Refill:  2     1. Recurrent acute deep vein thrombosis (DVT) of left lower extremity ([address]  Refer to heme re? ?lifelong AC    - REFERRAL TO HEMATOLOGY ONCOLOGY  - rivaroxaban (Xarelto) 20 mg tab tablet; Take 1 Tablet by mouth daily (with dinner). Dispense: 30 Tablet; Refill: 2          There are no Patient Instructions on file for this visit. Follow-up and Dispositions    · Return in about 8 weeks (around 9/14/2021). I have reviewed with the patient details of the assessment and plan and all questions were answered. Relevent patient education was performed. The most recent lab findings were reviewed with the patient. An After Visit Summary was printed and given to the patient.

## 2021-08-25 ENCOUNTER — OFFICE VISIT (OUTPATIENT)
Dept: INTERNAL MEDICINE CLINIC | Age: 31
End: 2021-08-25
Payer: COMMERCIAL

## 2021-08-25 VITALS
BODY MASS INDEX: 37.61 KG/M2 | OXYGEN SATURATION: 94 % | RESPIRATION RATE: 20 BRPM | WEIGHT: 234 LBS | HEIGHT: 66 IN | SYSTOLIC BLOOD PRESSURE: 142 MMHG | DIASTOLIC BLOOD PRESSURE: 87 MMHG | HEART RATE: 90 BPM | TEMPERATURE: 98.6 F

## 2021-08-25 DIAGNOSIS — Z79.01 LONG TERM (CURRENT) USE OF ANTICOAGULANTS: ICD-10-CM

## 2021-08-25 DIAGNOSIS — I82.402 RECURRENT ACUTE DEEP VEIN THROMBOSIS (DVT) OF LEFT LOWER EXTREMITY (HCC): Primary | ICD-10-CM

## 2021-08-25 DIAGNOSIS — R03.0 ELEVATED BLOOD PRESSURE READING: ICD-10-CM

## 2021-08-25 DIAGNOSIS — Z11.3 SCREEN FOR STD (SEXUALLY TRANSMITTED DISEASE): ICD-10-CM

## 2021-08-25 DIAGNOSIS — D50.8 OTHER IRON DEFICIENCY ANEMIA: ICD-10-CM

## 2021-08-25 DIAGNOSIS — E66.9 OBESITY (BMI 35.0-39.9 WITHOUT COMORBIDITY): ICD-10-CM

## 2021-08-25 DIAGNOSIS — G47.33 OSA (OBSTRUCTIVE SLEEP APNEA): ICD-10-CM

## 2021-08-25 PROBLEM — Z86.711 HISTORY OF PULMONARY EMBOLISM: Status: ACTIVE | Noted: 2019-06-12

## 2021-08-25 PROBLEM — I26.99 PE (PULMONARY THROMBOEMBOLISM) (HCC): Status: RESOLVED | Noted: 2019-06-12 | Resolved: 2021-08-25

## 2021-08-25 PROBLEM — E66.01 OBESITY, MORBID (HCC): Status: RESOLVED | Noted: 2019-06-27 | Resolved: 2021-08-25

## 2021-08-25 PROCEDURE — 99214 OFFICE O/P EST MOD 30 MIN: CPT | Performed by: INTERNAL MEDICINE

## 2021-08-25 NOTE — PROGRESS NOTES
Chief Complaint   Patient presents with    Follow-up     3 most recent PHQ Screens 8/25/2021   Little interest or pleasure in doing things Not at all   Feeling down, depressed, irritable, or hopeless Not at all   Total Score PHQ 2 0     1. Have you been to the ER, urgent care clinic since your last visit? Hospitalized since your last visit?no    2. Have you seen or consulted any other health care providers outside of the [address] [place] [address] since your last visit? Include any pap smears or colon screening.  no

## 2021-08-25 NOTE — PATIENT INSTRUCTIONS

## 2021-08-25 NOTE — PROGRESS NOTES
Marciano Aguayo is a 26 y.o. female and presents with Follow-up  . Subjective:      PMH:  Recurrent DVT, popliteal- dx'ed 7/14/2021    PMH:H/o DVT 2019- s/p NOAC tx   Elevated blood pressure-  BP Readings from Last 3 Encounters:   08/25/21 (!) 142/87   07/20/21 128/89   07/14/21 (!) 150/94      YAEL on cpap, uses \"sometimes\"     Obesity-  Wt Readings from Last 3 Encounters:   08/25/21 234 lb (106.1 kg)   07/20/21 233 lb (105.7 kg)   07/14/21 233 lb (105.7 kg)        \"Arryythmia\"    Review of Systems  Review of systems (12) negative, except noted above. Past Medical History:   Diagnosis Date    Cancer ([facility] [address]     ovarian     Hypertension     with pregnancy    Ill-defined condition     MS    Ill-defined condition     TIMOTHY 1 with last pap     History reviewed. No pertinent surgical history. Social History     Socioeconomic History    Marital status: SINGLE     Spouse name: Not on file    Number of children: Not on file    Years of education: Not on file    Highest education level: Not on file   Tobacco Use    Smoking status: Current Some Day Smoker     Packs/day: 0.25     Years: 10.00     Pack years: 2.50     Types: Cigars    Smokeless tobacco: Former User    Tobacco comment: black and milds occ   Vaping Use    Vaping Use: Never used   Substance and Sexual Activity    Alcohol use: Yes     Alcohol/week: 3.0 standard drinks     Types: 3 Shots of liquor per week     Comment: occ    Drug use: Not Currently     Comment: occ    Sexual activity: Yes     Partners: Male     Birth control/protection: None     Social Determinants of Health     Financial Resource Strain:     Difficulty of Paying Living Expenses:    Food Insecurity:     Worried About Running Out of Food in the Last Year:     [address] in the Last Year:    Transportation Needs:     Lack of Transportation (Medical):      Lack of Transportation (Non-Medical):    Physical Activity:     Days of Exercise per Week:     Minutes of Exercise per Session:    Stress:     Feeling of Stress :    Social Connections:     Frequency of Communication with Friends and Family:     Frequency of Social Gatherings with Friends and Family:     Attends [employer] Services:     Active Member of Clubs or Organizations:     Attends Club or Organization Meetings:     Marital Status:      Family History   Problem Relation Age of Onset    Kidney Disease Mother     Cancer Father        Allergies   Allergen Reactions    Shellfish Derived Swelling       Objective:  Visit Vitals  BP (!) 142/87 (BP 1 Location: Right arm, BP Patient Position: Sitting, BP Cuff Size: Adult)   Pulse 90   Temp 98.6 °F (37 °C) (Oral)   Resp 20   Ht 5' 6\" (1.676 m)   Wt 234 lb (106.1 kg)   LMP 08/18/2021   SpO2 94%   BMI 37.77 kg/m²     Physical Exam:   General appearance - alert, morbidly obese  Mental status - alert, oriented to person, place, and time  EYE-EOMI  Neck - supple,   Chest - CTA dustin  [employer]- rrr  Abdomen - obese  Ext-trace edema lle @ ankle  Skin-Warm and dry. no hyperpigmentation, vitiligo, or suspicious lesions  Neuro -alert, oriented, normal speech, no focal findings or movement disorder noted        Results for orders placed or performed during the hospital encounter of 07/14/21   TROPONIN I   Result Value Ref Range    Troponin-I, Qt. <0.05 <0.05 ng/mL   PROTHROMBIN TIME + INR   Result Value Ref Range    INR 1.0 0.9 - 1.1      Prothrombin time 10.7 9.0 - 11.1 sec   POC CHEM8   Result Value Ref Range    Calcium, ionized (POC) 1.24 1.12 - 1.32 mmol/L    Sodium (POC) 143 136 - 145 mmol/L    Potassium (POC) 3.8 3.5 - 5.1 mmol/L    Chloride (POC) 104 98 - 107 mmol/L    CO2 (POC) 28.1 21 - 32 mmol/L    Anion gap (POC) 12 10 - 20 mmol/L    Glucose (POC) 86 65 - 100 mg/dL    Creatinine (POC) 1.01 0.6 - 1.3 mg/dL    GFRAA, POC >60 >60 ml/min/1.73m2    GFRNA, POC >60 >60 ml/min/1.73m2    Comment Comment Not Indicated.      EKG, 12 LEAD, INITIAL   Result Value Ref Range    Ventricular Rate 94 BPM    Atrial Rate 94 BPM    P-R Interval 164 ms    QRS Duration 78 ms    Q-T Interval 364 ms    QTC Calculation (Bezet) 455 ms    Calculated P Axis 60 degrees    Calculated R Axis 13 degrees    Calculated T Axis 0 degrees    Diagnosis       Normal sinus rhythm  Minimal voltage criteria for LVH, may be normal variant  Nonspecific ST and T wave abnormality  Poor R-wave Progression (consider lead placement or loss of anterior forces)  When compared with ECG of 12-JUN-2019 22:16,  No significant change was found  Confirmed by Natividad Ovalles MD, Samuel Shah ([id number]) on 7/15/2021 4:12:35 PM         Assessment/Plan:    ICD-10-CM ICD-9-CM    1. Recurrent acute deep vein thrombosis (DVT) of lower extremity, unspecified laterality (HCC)  I82.409 453.40    2. Long term (current) use of anticoagulants  Z79.01 V58.61    3. YAEL (obstructive sleep apnea)  G47.33 327.23    4. Obesity (BMI 35.0-39.9 without comorbidity)  E66.9 278.00    5. Other iron deficiency anemia  D50.8 280.8      No orders of the defined types were placed in this encounter. 1. Recurrent acute deep vein thrombosis (DVT) of lower extremity, unspecified laterality ([address]  Noted  Pt will most likely need lifelong AC given this is the second episode and  is unprovoked    2. Long term (current) use of anticoagulants  Cont xarelto    3. YAEL (obstructive sleep apnea)  Noted    4. Obesity (BMI 35.0-39.9 without comorbidity)  D/w pt daily walking (at least 20 minutes), healthy diet w fruits and/or veggies w each meal, portion sizes and weight loss    - THYROID CASCADE PROFILE; Future  - LIPID PANEL; Future  - METABOLIC PANEL, COMPREHENSIVE; Future  - HEMOGLOBIN A1C WITH EAG; Future    5. Other iron deficiency anemia    - CBC WITH AUTOMATED DIFF; Future  - FERRITIN; Future    6. Screen for STD (sexually transmitted disease)    - T VAGINALIS AMPLIFICATION; Future  - HIV 1/2 AG/AB, 4TH GENERATION,W RFLX CONFIRM; Future  - Karron Toya / GC-AMPLIFIED;  Future  - HEPATITIS C AB, RFLX TO QT BY PCR; Future    7. Elevated blood pressure  D/w pt low salt diet/weight loss and reg exercise          There are no Patient Instructions on file for this visit. I have reviewed with the patient details of the assessment and plan and all questions were answered. Relevent patient education was performed. The most recent lab findings were reviewed with the patient. An After Visit Summary was printed and given to the patient.

## 2021-08-27 NOTE — PROGRESS NOTES
[facility]  [address], [address] [facility]    OUTPATIENT PHYSICAL THERAPY DISCHARGE      8/27/2021:  Patient will be discharged from physical therapy at this time. Criteria for termination of care: Other: returning to doctor to r/o DVT    Please refer to the initial evaluation on 6/28/21 for any pertinent PT info available. If you need anything further faxed to you, please contact us at [phone].     Thank you for this referral.  Julia Vargas, PT

## 2021-10-07 ENCOUNTER — OFFICE VISIT (OUTPATIENT)
Dept: ONCOLOGY | Age: 31
End: 2021-10-07
Payer: COMMERCIAL

## 2021-10-07 VITALS
OXYGEN SATURATION: 96 % | TEMPERATURE: 98.4 F | SYSTOLIC BLOOD PRESSURE: 120 MMHG | RESPIRATION RATE: 18 BRPM | BODY MASS INDEX: 37.77 KG/M2 | WEIGHT: 234 LBS | HEART RATE: 94 BPM | DIASTOLIC BLOOD PRESSURE: 80 MMHG

## 2021-10-07 DIAGNOSIS — E66.9 OBESITY (BMI 35.0-39.9 WITHOUT COMORBIDITY): ICD-10-CM

## 2021-10-07 DIAGNOSIS — I82.452 ACUTE DEEP VEIN THROMBOSIS (DVT) OF LEFT PERONEAL VEIN (HCC): Primary | ICD-10-CM

## 2021-10-07 PROCEDURE — 99203 OFFICE O/P NEW LOW 30 MIN: CPT | Performed by: INTERNAL MEDICINE
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
## 2021-10-07 NOTE — PROGRESS NOTES
Nicholas Cisneros is a 33 y.o. female    Chief Complaint   Patient presents with    New Patient     Recurrent DVT       1. Have you been to the ER, urgent care clinic since your last visit? Hospitalized since your last visit? No    2. Have you seen or consulted any other health care providers outside of the [address] [place] [address] since your last visit? Include any pap smears or colon screening.  No

## 2021-10-22 ENCOUNTER — OFFICE VISIT (OUTPATIENT)
Dept: INTERNAL MEDICINE CLINIC | Age: 31
End: 2021-10-22
Payer: COMMERCIAL

## 2021-10-22 VITALS
WEIGHT: 233 LBS | OXYGEN SATURATION: 96 % | DIASTOLIC BLOOD PRESSURE: 74 MMHG | HEIGHT: 66 IN | BODY MASS INDEX: 37.45 KG/M2 | TEMPERATURE: 98.4 F | HEART RATE: 96 BPM | SYSTOLIC BLOOD PRESSURE: 136 MMHG | RESPIRATION RATE: 19 BRPM

## 2021-10-22 DIAGNOSIS — G47.33 OSA (OBSTRUCTIVE SLEEP APNEA): ICD-10-CM

## 2021-10-22 DIAGNOSIS — I82.409 RECURRENT ACUTE DEEP VEIN THROMBOSIS (DVT) OF LOWER EXTREMITY, UNSPECIFIED LATERALITY (HCC): Primary | ICD-10-CM

## 2021-10-22 DIAGNOSIS — Z79.01 LONG TERM (CURRENT) USE OF ANTICOAGULANTS: ICD-10-CM

## 2021-10-22 PROCEDURE — 99214 OFFICE O/P EST MOD 30 MIN: CPT | Performed by: INTERNAL MEDICINE

## 2021-10-22 NOTE — PROGRESS NOTES
Farzad Adhikari is a 33 y.o. female and presents with Referral Follow Up (Hemotology)  . Subjective:      PMH:  Recurrent DVT, popliteal- dx'ed 7/14/2021   -lifelong AC as confirmed by heme       Elevated blood pressure-  BP Readings from Last 3 Encounters:   10/22/21 136/74   10/07/21 120/80   08/25/21 (!) 142/87      YAEL on cpap, uses \"sometimes\"     Obesity-  Wt Readings from Last 3 Encounters:   10/22/21 233 lb (105.7 kg)   10/07/21 234 lb (106.1 kg)   08/25/21 234 lb (106.1 kg)        \"Arryythmia\"    Review of Systems  Review of systems (12) negative, except noted above. Past Medical History:   Diagnosis Date    Cancer ([facility])     ovarian     Hypertension     with pregnancy    Ill-defined condition     MS    Ill-defined condition     TIMOTHY 1 with last pap     History reviewed. No pertinent surgical history. Social History     Socioeconomic History    Marital status: SINGLE     Spouse name: Not on file    Number of children: Not on file    Years of education: Not on file    Highest education level: Not on file   Tobacco Use    Smoking status: Current Some Day Smoker     Packs/day: 0.25     Years: 10.00     Pack years: 2.50     Types: Cigars    Smokeless tobacco: Former User    Tobacco comment: black and milds occ   Vaping Use    Vaping Use: Never used   Substance and Sexual Activity    Alcohol use: Yes     Alcohol/week: 3.0 standard drinks     Types: 3 Shots of liquor per week     Comment: occ    Drug use: Not Currently     Comment: occ    Sexual activity: Yes     Partners: Male     Birth control/protection: None     Social Determinants of Health     Financial Resource Strain:     Difficulty of Paying Living Expenses:    Food Insecurity:     Worried About Running Out of Food in the Last Year:     [address] in the Last Year:    Transportation Needs:     Lack of Transportation (Medical):      Lack of Transportation (Non-Medical):    Physical Activity:     Days of Exercise per Week:     Minutes of Exercise per Session:    Stress:     Feeling of Stress :    Social Connections:     Frequency of Communication with Friends and Family:     Frequency of Social Gatherings with Friends and Family:     Attends [employer] Services:     Active Member of Clubs or Organizations:     Attends Club or Organization Meetings:     Marital Status:      Family History   Problem Relation Age of Onset    Kidney Disease Mother     Cancer Father        Allergies   Allergen Reactions    Shellfish Derived Swelling       Objective:  Visit Vitals  /74 (BP 1 Location: Left upper arm, BP Patient Position: Sitting, BP Cuff Size: Large adult)   Pulse 96   Temp 98.4 °F (36.9 °C) (Temporal)   Resp 19   Ht 5' 6\" (1.676 m)   Wt 233 lb (105.7 kg)   LMP 09/21/2021 (Within Days)   SpO2 96%   BMI 37.61 kg/m²     Physical Exam:   General appearance - alert, morbidly obese  Mental status - alert, oriented to person, place, and time  EYE-EOMI  Neck - supple,   Chest - CTA dustin  [employer]- rrr  Abdomen - obese  Ext-trace edema lle @ ankle  Skin-Warm and dry. no hyperpigmentation, vitiligo, or suspicious lesions  Neuro -alert, oriented, normal speech, no focal findings or movement disorder noted        Results for orders placed or performed during the hospital encounter of 07/14/21   TROPONIN I   Result Value Ref Range    Troponin-I, Qt. <0.05 <0.05 ng/mL   PROTHROMBIN TIME + INR   Result Value Ref Range    INR 1.0 0.9 - 1.1      Prothrombin time 10.7 9.0 - 11.1 sec   POC CHEM8   Result Value Ref Range    Calcium, ionized (POC) 1.24 1.12 - 1.32 mmol/L    Sodium (POC) 143 136 - 145 mmol/L    Potassium (POC) 3.8 3.5 - 5.1 mmol/L    Chloride (POC) 104 98 - 107 mmol/L    CO2 (POC) 28.1 21 - 32 mmol/L    Anion gap (POC) 12 10 - 20 mmol/L    Glucose (POC) 86 65 - 100 mg/dL    Creatinine (POC) 1.01 0.6 - 1.3 mg/dL    GFRAA, POC >60 >60 ml/min/1.73m2    GFRNA, POC >60 >60 ml/min/1.73m2    Comment Comment Not Indicated.      EKG, 12 LEAD, INITIAL   Result Value Ref Range    Ventricular Rate 94 BPM    Atrial Rate 94 BPM    P-R Interval 164 ms    QRS Duration 78 ms    Q-T Interval 364 ms    QTC Calculation (Bezet) 455 ms    Calculated P Axis 60 degrees    Calculated R Axis 13 degrees    Calculated T Axis 0 degrees    Diagnosis       Normal sinus rhythm  Minimal voltage criteria for LVH, may be normal variant  Nonspecific ST and T wave abnormality  Poor R-wave Progression (consider lead placement or loss of anterior forces)  When compared with ECG of 12-JUN-2019 22:16,  No significant change was found  Confirmed by Farzad aBker MD, Corbin Maurer ([id number]) on 7/15/2021 4:12:35 PM         Assessment/Plan:    ICD-10-CM ICD-9-CM    1. Recurrent acute deep vein thrombosis (DVT) of lower extremity, unspecified laterality (HCC)  I82.409 453.40    2. Long term (current) use of anticoagulants  Z79.01 V58.61    3. YAEL (obstructive sleep apnea)  G47.33 327.23      No orders of the defined types were placed in this encounter. 1. Recurrent acute deep vein thrombosis (DVT) of lower extremity, unspecified laterality ([address]  Noted    2. Long term (current) use of anticoagulants  Cont xarelto    3. YAEL (obstructive sleep apnea)  Noted          There are no Patient Instructions on file for this visit. Follow-up and Dispositions    · Return in about 6 months (around 4/22/2022) for bp check. I have reviewed with the patient details of the assessment and plan and all questions were answered. Relevent patient education was performed. The most recent lab findings were reviewed with the patient. An After Visit Summary was printed and given to the patient.

## 2022-02-25 ENCOUNTER — TELEPHONE (OUTPATIENT)
Dept: ONCOLOGY | Age: 32
End: 2022-02-25

## 2022-03-02 LAB
F2 GENE MUT ANL BLD/T: NORMAL
F5 GENE MUT ANL BLD/T: NORMAL

## 2022-03-15 ENCOUNTER — OFFICE VISIT (OUTPATIENT)
Dept: INTERNAL MEDICINE CLINIC | Age: 32
End: 2022-03-15
Payer: COMMERCIAL

## 2022-03-15 VITALS
HEIGHT: 66 IN | OXYGEN SATURATION: 100 % | DIASTOLIC BLOOD PRESSURE: 93 MMHG | BODY MASS INDEX: 37.61 KG/M2 | HEART RATE: 88 BPM | RESPIRATION RATE: 18 BRPM | TEMPERATURE: 98.4 F | SYSTOLIC BLOOD PRESSURE: 150 MMHG | WEIGHT: 234 LBS

## 2022-03-15 DIAGNOSIS — I26.99 RECURRENT PULMONARY EMBOLI (HCC): ICD-10-CM

## 2022-03-15 DIAGNOSIS — D50.0 IRON DEFICIENCY ANEMIA DUE TO CHRONIC BLOOD LOSS: ICD-10-CM

## 2022-03-15 DIAGNOSIS — I82.409 RECURRENT ACUTE DEEP VEIN THROMBOSIS (DVT) OF LOWER EXTREMITY, UNSPECIFIED LATERALITY (HCC): ICD-10-CM

## 2022-03-15 DIAGNOSIS — Z76.89 ENCOUNTER FOR SUPPORT AND COORDINATION OF TRANSITION OF CARE: Primary | ICD-10-CM

## 2022-03-15 DIAGNOSIS — Z79.01 LONG TERM (CURRENT) USE OF ANTICOAGULANTS: ICD-10-CM

## 2022-03-15 DIAGNOSIS — E66.9 OBESITY (BMI 35.0-39.9 WITHOUT COMORBIDITY): ICD-10-CM

## 2022-03-15 DIAGNOSIS — M54.9 ACUTE BACK PAIN, UNSPECIFIED BACK LOCATION, UNSPECIFIED BACK PAIN LATERALITY: ICD-10-CM

## 2022-03-15 PROCEDURE — 99215 OFFICE O/P EST HI 40 MIN: CPT | Performed by: INTERNAL MEDICINE

## 2022-03-15 RX ORDER — LANOLIN ALCOHOL/MO/W.PET/CERES
1000 CREAM (GRAM) TOPICAL DAILY
COMMUNITY
Start: 2022-02-19 | End: 2022-07-25 | Stop reason: SDUPTHER
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
## 2022-03-18 PROBLEM — N85.4: Status: ACTIVE | Noted: 2020-02-10

## 2022-03-18 PROBLEM — Z79.01 LONG TERM (CURRENT) USE OF ANTICOAGULANTS: Status: ACTIVE | Noted: 2021-08-25

## 2022-03-19 PROBLEM — R00.0 TACHYCARDIA: Status: ACTIVE | Noted: 2019-08-27

## 2022-03-19 PROBLEM — F41.9 ANXIETY: Status: ACTIVE | Noted: 2019-12-03

## 2022-03-19 PROBLEM — K43.9 SUPRAUMBILICAL HERNIA: Status: ACTIVE | Noted: 2019-07-02

## 2022-03-19 PROBLEM — E66.9 OBESITY (BMI 35.0-39.9 WITHOUT COMORBIDITY): Status: ACTIVE | Noted: 2021-08-25

## 2022-03-19 PROBLEM — Z86.711 HISTORY OF PULMONARY EMBOLISM: Status: ACTIVE | Noted: 2019-06-12

## 2022-03-19 PROBLEM — G47.33 OSA (OBSTRUCTIVE SLEEP APNEA): Status: ACTIVE | Noted: 2019-08-27

## 2022-03-19 PROBLEM — M41.9 SCOLIOSIS OF THORACIC SPINE: Status: ACTIVE | Noted: 2019-07-02

## 2022-03-19 PROBLEM — I82.409 RECURRENT ACUTE DEEP VEIN THROMBOSIS (DVT) OF LOWER EXTREMITY, UNSPECIFIED LATERALITY (HCC): Status: ACTIVE | Noted: 2019-09-17

## 2022-03-19 PROBLEM — D64.9 ANEMIA: Status: ACTIVE | Noted: 2019-06-14

## 2022-03-20 PROBLEM — I82.452 ACUTE DEEP VEIN THROMBOSIS (DVT) OF LEFT PERONEAL VEIN (HCC): Status: ACTIVE | Noted: 2021-10-07

## 2022-03-20 PROBLEM — N83.201 OVARIAN CYST, RIGHT: Status: ACTIVE | Noted: 2019-07-02

## 2022-03-20 PROBLEM — N92.0 MENORRHAGIA WITH REGULAR CYCLE: Status: ACTIVE | Noted: 2021-06-23

## 2022-03-24 PROBLEM — I26.99 RECURRENT PULMONARY EMBOLI (HCC): Status: ACTIVE | Noted: 2019-06-12

## 2022-03-24 PROBLEM — D50.0 IRON DEFICIENCY ANEMIA DUE TO CHRONIC BLOOD LOSS: Status: ACTIVE | Noted: 2022-03-15

## 2022-04-01 ENCOUNTER — OFFICE VISIT (OUTPATIENT)
Dept: INTERNAL MEDICINE CLINIC | Age: 32
End: 2022-04-01
Payer: COMMERCIAL

## 2022-04-01 VITALS
HEIGHT: 66 IN | OXYGEN SATURATION: 98 % | HEART RATE: 93 BPM | WEIGHT: 236.5 LBS | DIASTOLIC BLOOD PRESSURE: 64 MMHG | SYSTOLIC BLOOD PRESSURE: 125 MMHG | BODY MASS INDEX: 38.01 KG/M2 | RESPIRATION RATE: 19 BRPM | TEMPERATURE: 98.4 F

## 2022-04-01 DIAGNOSIS — I82.409 RECURRENT ACUTE DEEP VEIN THROMBOSIS (DVT) OF LOWER EXTREMITY, UNSPECIFIED LATERALITY (HCC): ICD-10-CM

## 2022-04-01 DIAGNOSIS — M41.9 SCOLIOSIS OF THORACIC SPINE, UNSPECIFIED SCOLIOSIS TYPE: ICD-10-CM

## 2022-04-01 DIAGNOSIS — D50.9 IRON DEFICIENCY ANEMIA, UNSPECIFIED IRON DEFICIENCY ANEMIA TYPE: Primary | ICD-10-CM

## 2022-04-01 DIAGNOSIS — Z79.01 LONG TERM (CURRENT) USE OF ANTICOAGULANTS: ICD-10-CM

## 2022-04-01 LAB — HGB BLD-MCNC: 7.4 G/DL

## 2022-04-01 PROCEDURE — 85018 HEMOGLOBIN: CPT | Performed by: INTERNAL MEDICINE

## 2022-04-01 PROCEDURE — 99214 OFFICE O/P EST MOD 30 MIN: CPT | Performed by: INTERNAL MEDICINE

## 2022-04-01 NOTE — PROGRESS NOTES
Dorothea Severin is a 33 y.o. female and presents with Follow-up  . Subjective:    Pt was admitted to [address] [place] [address] w back pain and dx'ed w pulmonary embolism. Pt admits to noncompliance w xarelto. Pt was administered IV iron due to anemia  Pt reports she has been taking PO iron  Lab Results   Component Value Date/Time    Hemoglobin (POC) 7.4 04/01/2022 11:10 AM    Hemoglobin (POC) 13.3 02/15/2015 08:42 PM    HGB 9.2 (L) 07/02/2019 11:03 AM       ALSO, pt was instructed by her physical therapist that she may need to see a specialist about her back, abn curvature. As per pts chart, she has had an abnormal curvature since films 2016. PMH:  Recurrent DVT, popliteal- dx'ed 7/14/2021   -lifelong AC as confirmed by heme       Elevated blood pressure-  BP Readings from Last 3 Encounters:   03/15/22 (!) 150/93   10/22/21 136/74   10/07/21 120/80      YAEL on cpap, uses \"sometimes\"     Obesity-  Wt Readings from Last 3 Encounters:   04/01/22 236 lb 8 oz (107.3 kg)   03/15/22 234 lb (106.1 kg)   10/22/21 233 lb (105.7 kg)        \"Arrythmia\"    Review of Systems  Review of systems (12) negative, except noted above. Past Medical History:   Diagnosis Date    Cancer ([facility] [address]     ovarian     Hypertension     with pregnancy    Ill-defined condition     MS    Ill-defined condition     TIMOTHY 1 with last pap     History reviewed. No pertinent surgical history. Social History     Socioeconomic History    Marital status: SINGLE   Tobacco Use    Smoking status: Current Some Day Smoker     Packs/day: 0.25     Years: 10.00     Pack years: 2.50     Types: Cigars    Smokeless tobacco: Former User    Tobacco comment: black and milds occ   Vaping Use    Vaping Use: Never used   Substance and Sexual Activity    Alcohol use:  Yes     Alcohol/week: 3.0 standard drinks     Types: 3 Shots of liquor per week     Comment: occ    Drug use: Not Currently     Comment: occ    Sexual activity: Yes     Partners: Male     Birth control/protection: None Family History   Problem Relation Age of Onset    Kidney Disease Mother     Cancer Father        Allergies   Allergen Reactions    Shellfish Derived Swelling       Objective:  Visit Vitals  Temp 98.4 °F (36.9 °C) (Temporal)   Ht 5' 6\" (1.676 m)   Wt 236 lb 8 oz (107.3 kg)   BMI 38.17 kg/m²     Physical Exam:   General appearance - alert, well appearing, and in no distress   Mental status - alert, oriented to person, place, and time  EYE-EOMI  Neck - supple,   Chest - symmetric air entry    Abdomen - obese  Ext-no pedal edema, no clubbing or cyanosis  Skin-Warm and dry. no hyperpigmentation, vitiligo, or suspicious lesions  Neuro -alert, oriented, normal speech, no focal findings or movement disorder noted        Results for orders placed or performed in visit on 10/07/21   FACTOR V LEIDEN   Result Value Ref Range    Factor V Leiden Comment    FACTOR II  DNA ANALYSIS   Result Value Ref Range    Factor II, DNA Analysis Comment        Assessment/Plan:  No diagnosis found. No orders of the defined types were placed in this encounter. 1. Iron deficiency anemia, unspecified iron deficiency anemia type    - AMB POC HEMOGLOBIN (HGB)  - REFERRAL TO HEMATOLOGY ONCOLOGY  - REFERRAL TO OBSTETRICS AND GYNECOLOGY    2. Recurrent acute deep vein thrombosis (DVT) of lower extremity, unspecified laterality (HCC)    - REFERRAL TO HEMATOLOGY ONCOLOGY    3. Long term (current) use of anticoagulants  Noted    4. Scoliosis of thoracic spine, unspecified scoliosis type  Noted    There are no Patient Instructions on file for this visit. I have reviewed with the patient details of the assessment and plan and all questions were answered. Relevent patient education was performed. The most recent lab findings were reviewed with the patient. An After Visit Summary was printed and given to the patient.

## 2022-04-01 NOTE — PROGRESS NOTES
Chief Complaint   Patient presents with    Follow-up     1. Have you been to the ER, urgent care clinic since your last visit? Hospitalized since your last visit? No    2. Have you seen or consulted any other health care providers outside of the [address] [place] [address] since your last visit? Include any pap smears or colon screening.  No

## 2022-04-19 ENCOUNTER — TELEPHONE (OUTPATIENT)
Dept: ONCOLOGY | Age: 32
End: 2022-04-19

## 2022-04-19 NOTE — TELEPHONE ENCOUNTER
This patient wanted to schedule a follow up and has questions about their labs.  Please advise     CB# [phone]

## 2022-04-20 NOTE — TELEPHONE ENCOUNTER
200  Returned patients call, HIPAA verified x2. Patient stated that she needs a follow up appt scheduled and wants to review her labs. Message sent to scheduling to call pt with appt date/time.

## 2022-07-05 ENCOUNTER — OFFICE VISIT (OUTPATIENT)
Dept: INTERNAL MEDICINE CLINIC | Age: 32
End: 2022-07-05
Payer: COMMERCIAL

## 2022-07-05 VITALS
SYSTOLIC BLOOD PRESSURE: 145 MMHG | RESPIRATION RATE: 17 BRPM | TEMPERATURE: 98.3 F | WEIGHT: 240 LBS | BODY MASS INDEX: 38.57 KG/M2 | HEART RATE: 106 BPM | DIASTOLIC BLOOD PRESSURE: 88 MMHG | HEIGHT: 66 IN | OXYGEN SATURATION: 97 %

## 2022-07-05 DIAGNOSIS — F40.248 OTHER SITUATIONAL TYPE PHOBIA: Primary | ICD-10-CM

## 2022-07-05 DIAGNOSIS — I82.409 RECURRENT ACUTE DEEP VEIN THROMBOSIS (DVT) OF LOWER EXTREMITY, UNSPECIFIED LATERALITY (HCC): ICD-10-CM

## 2022-07-05 DIAGNOSIS — R03.0 ELEVATED BLOOD PRESSURE READING: ICD-10-CM

## 2022-07-05 DIAGNOSIS — E66.9 OBESITY (BMI 35.0-39.9 WITHOUT COMORBIDITY): ICD-10-CM

## 2022-07-05 DIAGNOSIS — D50.0 IRON DEFICIENCY ANEMIA DUE TO CHRONIC BLOOD LOSS: ICD-10-CM

## 2022-07-05 DIAGNOSIS — Z79.01 LONG TERM (CURRENT) USE OF ANTICOAGULANTS: ICD-10-CM

## 2022-07-05 DIAGNOSIS — G47.33 OSA (OBSTRUCTIVE SLEEP APNEA): ICD-10-CM

## 2022-07-05 PROCEDURE — 99214 OFFICE O/P EST MOD 30 MIN: CPT | Performed by: INTERNAL MEDICINE

## 2022-07-05 RX ORDER — SERTRALINE HYDROCHLORIDE 50 MG/1
100 TABLET, FILM COATED ORAL DAILY
Qty: 60 TABLET | Refills: 2 | Status: SHIPPED | OUTPATIENT
Start: 2022-07-05 | End: 2022-07-25 | Stop reason: CLARIF
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
## 2022-07-05 NOTE — PROGRESS NOTES
Identified pt with two pt identifiers(name and ). Reviewed record in preparation for visit and have obtained necessary documentation. All patient medications has been reviewed. Chief Complaint   Patient presents with    Follow-up       3 most recent PHQ Screens 2022   Little interest or pleasure in doing things More than half the days   Feeling down, depressed, irritable, or hopeless More than half the days   Total Score PHQ 2 4   Trouble falling or staying asleep, or sleeping too much More than half the days   Feeling tired or having little energy Nearly every day   Poor appetite, weight loss, or overeating Not at all   Feeling bad about yourself - or that you are a failure or have let yourself or your family down Not at all   Trouble concentrating on things such as school, work, reading, or watching TV Not at all   Moving or speaking so slowly that other people could have noticed; or the opposite being so fidgety that others notice Not at all   Thoughts of being better off dead, or hurting yourself in some way Not at all   PHQ 9 Score 9     Abuse Screening Questionnaire 2022   Do you ever feel afraid of your partner? N   Are you in a relationship with someone who physically or mentally threatens you? N   Is it safe for you to go home? Y       Health Maintenance Due   Topic    Hepatitis C Screening     Pneumococcal 0-64 years (1 - PCV)    DTaP/Tdap/Td series (1 - Tdap)    COVID-19 Vaccine (3 - Booster for [employer] series)     Health Maintenance Review: Patient reminded of \"due or due soon\" health maintenance. I have asked the patient to contact his/her primary care provider (PCP) for follow-up on his/her health maintenance.     Vitals:    22 1047   BP: (!) 145/88   Pulse: (!) 106   Resp: 17   Temp: 98.3 °F (36.8 °C)   TempSrc: Temporal   SpO2: 97%   Weight: 240 lb (108.9 kg)   Height: 5' 6\" (1.676 m)   PainSc:   0 - No pain   LMP: 2022       Wt Readings from Last 3 Encounters: 07/05/22 240 lb (108.9 kg)   04/01/22 236 lb 8 oz (107.3 kg)   03/15/22 234 lb (106.1 kg)     Temp Readings from Last 3 Encounters:   07/05/22 98.3 °F (36.8 °C) (Temporal)   04/01/22 98.4 °F (36.9 °C) (Temporal)   03/15/22 98.4 °F (36.9 °C) (Temporal)     BP Readings from Last 3 Encounters:   07/05/22 (!) 145/88   04/01/22 125/64   03/15/22 (!) 150/93     Pulse Readings from Last 3 Encounters:   07/05/22 (!) 106   04/01/22 93   03/15/22 88       1. \"Have you been to the ER, urgent care clinic since your last visit? Hospitalized since your last visit? \" No    2. \"Have you seen or consulted any other health care providers outside of the [address] [place] [address] since your last visit? \" No     3. For patients aged 39-70: Has the patient had a colonoscopy / FIT/ Cologuard? NA - based on age      If the patient is female:    4. For patients aged 41-77: Has the patient had a mammogram within the past 2 years? NA - based on age or sex      11. For patients aged 21-65: Has the patient had a pap smear?  Yes - no Care Gap present

## 2022-07-25 ENCOUNTER — VIRTUAL VISIT (OUTPATIENT)
Dept: INTERNAL MEDICINE CLINIC | Age: 32
End: 2022-07-25
Payer: COMMERCIAL

## 2022-07-25 DIAGNOSIS — F41.9 ANXIETY: ICD-10-CM

## 2022-07-25 DIAGNOSIS — E53.8 VITAMIN B12 DEFICIENCY: Primary | ICD-10-CM

## 2022-07-25 PROCEDURE — 99213 OFFICE O/P EST LOW 20 MIN: CPT | Performed by: INTERNAL MEDICINE

## 2022-07-25 RX ORDER — LANOLIN ALCOHOL/MO/W.PET/CERES
1000 CREAM (GRAM) TOPICAL DAILY
Qty: 90 TABLET | Refills: 1 | Status: SHIPPED | OUTPATIENT
Start: 2022-07-25 | End: 2022-10-07 | Stop reason: SDUPTHER

## 2022-07-25 RX ORDER — PAROXETINE HYDROCHLORIDE 20 MG/1
20 TABLET, FILM COATED ORAL DAILY
Qty: 30 TABLET | Refills: 2 | Status: SHIPPED | OUTPATIENT
Start: 2022-07-25 | End: 2022-10-07 | Stop reason: SDUPTHER
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
## 2022-10-07 ENCOUNTER — OFFICE VISIT (OUTPATIENT)
Dept: INTERNAL MEDICINE CLINIC | Age: 32
End: 2022-10-07
Payer: COMMERCIAL

## 2022-10-07 VITALS
BODY MASS INDEX: 39.53 KG/M2 | OXYGEN SATURATION: 99 % | SYSTOLIC BLOOD PRESSURE: 133 MMHG | TEMPERATURE: 98.3 F | DIASTOLIC BLOOD PRESSURE: 76 MMHG | HEIGHT: 66 IN | HEART RATE: 93 BPM | WEIGHT: 246 LBS | RESPIRATION RATE: 18 BRPM

## 2022-10-07 DIAGNOSIS — E53.8 VITAMIN B12 DEFICIENCY: ICD-10-CM

## 2022-10-07 DIAGNOSIS — D64.9 ANEMIA, UNSPECIFIED TYPE: ICD-10-CM

## 2022-10-07 DIAGNOSIS — M54.9 CHRONIC BACK PAIN, UNSPECIFIED BACK LOCATION, UNSPECIFIED BACK PAIN LATERALITY: ICD-10-CM

## 2022-10-07 DIAGNOSIS — F41.9 ANXIETY: Primary | ICD-10-CM

## 2022-10-07 DIAGNOSIS — Z79.01 LONG TERM (CURRENT) USE OF ANTICOAGULANTS: ICD-10-CM

## 2022-10-07 DIAGNOSIS — G89.29 CHRONIC BACK PAIN, UNSPECIFIED BACK LOCATION, UNSPECIFIED BACK PAIN LATERALITY: ICD-10-CM

## 2022-10-07 DIAGNOSIS — I82.402 RECURRENT ACUTE DEEP VEIN THROMBOSIS (DVT) OF LEFT LOWER EXTREMITY (HCC): ICD-10-CM

## 2022-10-07 DIAGNOSIS — Z11.3 SCREEN FOR STD (SEXUALLY TRANSMITTED DISEASE): ICD-10-CM

## 2022-10-07 PROCEDURE — 99214 OFFICE O/P EST MOD 30 MIN: CPT | Performed by: INTERNAL MEDICINE

## 2022-10-07 RX ORDER — LANOLIN ALCOHOL/MO/W.PET/CERES
1000 CREAM (GRAM) TOPICAL DAILY
Qty: 90 TABLET | Refills: 1 | Status: SHIPPED | OUTPATIENT
Start: 2022-10-07

## 2022-10-07 RX ORDER — PAROXETINE HYDROCHLORIDE 20 MG/1
20 TABLET, FILM COATED ORAL DAILY
Qty: 30 TABLET | Refills: 2 | Status: SHIPPED | OUTPATIENT
Start: 2022-10-07

## 2022-10-07 RX ORDER — FERROUS SULFATE 325(65) MG
325 TABLET, DELAYED RELEASE (ENTERIC COATED) ORAL 2 TIMES DAILY
Qty: 60 TABLET | Refills: 5 | Status: SHIPPED | OUTPATIENT
Start: 2022-10-07
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
## 2022-11-02 DIAGNOSIS — I82.402 RECURRENT ACUTE DEEP VEIN THROMBOSIS (DVT) OF LEFT LOWER EXTREMITY (HCC): ICD-10-CM

## 2022-11-02 DIAGNOSIS — D64.9 ANEMIA, UNSPECIFIED TYPE: ICD-10-CM

## 2022-11-02 DIAGNOSIS — Z11.3 SCREEN FOR STD (SEXUALLY TRANSMITTED DISEASE): ICD-10-CM

## 2022-11-02 DIAGNOSIS — E53.8 VITAMIN B12 DEFICIENCY: ICD-10-CM

## 2022-11-03 LAB
ALBUMIN SERPL-MCNC: 3.3 G/DL (ref 3.5–5)
ALBUMIN/GLOB SERPL: 0.6 {RATIO} (ref 1.1–2.2)
ALP SERPL-CCNC: 78 U/L (ref 45–117)
ALT SERPL-CCNC: 16 U/L (ref 12–78)
ANION GAP SERPL CALC-SCNC: 8 MMOL/L (ref 5–15)
AST SERPL-CCNC: 13 U/L (ref 15–37)
BASOPHILS # BLD: 0 K/UL (ref 0–0.1)
BASOPHILS NFR BLD: 0 % (ref 0–1)
BILIRUB SERPL-MCNC: 0.2 MG/DL (ref 0.2–1)
BUN SERPL-MCNC: 10 MG/DL (ref 6–20)
BUN/CREAT SERPL: 11 (ref 12–20)
CALCIUM SERPL-MCNC: 9.5 MG/DL (ref 8.5–10.1)
CHLORIDE SERPL-SCNC: 102 MMOL/L (ref 97–108)
CHOLEST SERPL-MCNC: 185 MG/DL
CO2 SERPL-SCNC: 24 MMOL/L (ref 21–32)
CREAT SERPL-MCNC: 0.88 MG/DL (ref 0.55–1.02)
DIFFERENTIAL METHOD BLD: ABNORMAL
EOSINOPHIL # BLD: 0.1 K/UL (ref 0–0.4)
EOSINOPHIL NFR BLD: 1 % (ref 0–7)
ERYTHROCYTE [DISTWIDTH] IN BLOOD BY AUTOMATED COUNT: 19.7 % (ref 11.5–14.5)
GLOBULIN SER CALC-MCNC: 5.2 G/DL (ref 2–4)
GLUCOSE SERPL-MCNC: 96 MG/DL (ref 65–100)
HCT VFR BLD AUTO: 31.4 % (ref 35–47)
HDLC SERPL-MCNC: 48 MG/DL
HDLC SERPL: 3.9 {RATIO} (ref 0–5)
HGB BLD-MCNC: 9.3 G/DL (ref 11.5–16)
HIV 1+2 AB+HIV1 P24 AG SERPL QL IA: NONREACTIVE
HIV12 RESULT COMMENT, HHIVC: NORMAL
IMM GRANULOCYTES # BLD AUTO: 0.1 K/UL (ref 0–0.04)
IMM GRANULOCYTES NFR BLD AUTO: 1 % (ref 0–0.5)
LDLC SERPL CALC-MCNC: 87.6 MG/DL (ref 0–100)
LYMPHOCYTES # BLD: 2.7 K/UL (ref 0.8–3.5)
LYMPHOCYTES NFR BLD: 20 % (ref 12–49)
MCH RBC QN AUTO: 23 PG (ref 26–34)
MCHC RBC AUTO-ENTMCNC: 29.6 G/DL (ref 30–36.5)
MCV RBC AUTO: 77.5 FL (ref 80–99)
MONOCYTES # BLD: 0.8 K/UL (ref 0–1)
MONOCYTES NFR BLD: 6 % (ref 5–13)
NEUTS SEG # BLD: 9.8 K/UL (ref 1.8–8)
NEUTS SEG NFR BLD: 72 % (ref 32–75)
NRBC # BLD: 0 K/UL (ref 0–0.01)
NRBC BLD-RTO: 0 PER 100 WBC
PLATELET # BLD AUTO: 369 K/UL (ref 150–400)
PMV BLD AUTO: 11.4 FL (ref 8.9–12.9)
POTASSIUM SERPL-SCNC: 4 MMOL/L (ref 3.5–5.1)
PROT SERPL-MCNC: 8.5 G/DL (ref 6.4–8.2)
RBC # BLD AUTO: 4.05 M/UL (ref 3.8–5.2)
SODIUM SERPL-SCNC: 134 MMOL/L (ref 136–145)
TRIGL SERPL-MCNC: 247 MG/DL (ref ?–150)
VIT B12 SERPL-MCNC: 333 PG/ML (ref 193–986)
VLDLC SERPL CALC-MCNC: 49.4 MG/DL
WBC # BLD AUTO: 13.4 K/UL (ref 3.6–11)

## 2022-11-04 LAB
HCV AB S/CO SERPL IA: <0.1 S/CO RATIO (ref 0–0.9)
HCV AB SERPL QL IA: NORMAL
TSH SERPL-ACNC: 0.93 UIU/ML (ref 0.45–4.5)

## 2022-11-05 LAB
C TRACH RRNA SPEC QL NAA+PROBE: POSITIVE
N GONORRHOEA RRNA SPEC QL NAA+PROBE: NEGATIVE
SPECIMEN SOURCE: ABNORMAL

## 2022-11-06 LAB
SPECIMEN SOURCE: NORMAL
T VAGINALIS RRNA SPEC QL NAA+PROBE: NEGATIVE

## 2022-11-07 ENCOUNTER — TELEPHONE (OUTPATIENT)
Dept: INTERNAL MEDICINE CLINIC | Age: 32
End: 2022-11-07

## 2022-11-07 DIAGNOSIS — A56.09 CHLAMYDIA VAGINITIS/CERVICITIS: Primary | ICD-10-CM

## 2022-11-07 DIAGNOSIS — A56.02 CHLAMYDIA VAGINITIS/CERVICITIS: Primary | ICD-10-CM

## 2022-11-07 RX ORDER — DOXYCYCLINE 100 MG/1
100 CAPSULE ORAL 2 TIMES DAILY
Qty: 14 CAPSULE | Refills: 0 | Status: SHIPPED | OUTPATIENT
Start: 2022-11-07

## 2022-12-08 ENCOUNTER — NURSE TRIAGE (OUTPATIENT)
Dept: OTHER | Facility: CLINIC | Age: 32
End: 2022-12-08

## 2022-12-08 NOTE — TELEPHONE ENCOUNTER
Location of patient: [address] wetzel from Pradeep Sutton at [facility] with [employer]. Subjective: Caller states \"feet swelling, racing heart, sob\"     Current Symptoms: see above, left foot swelling, hx of blood clots, has been seen for this before. SOB with exertion/talking, some pressure with the sob. Heart racing comes and goes, does not happen every day, more consistent today, fatigue    Onset: 2 weeks ago; not getting better    Associated Symptoms: reduced activity, reduced appetite    Pain Severity: 0/10; Temperature: denies fever     What has been tried: nothing    LMP:  October  Pregnant: Unknown    Recommended disposition: Go to ED Now, patient declined, states she wants to come see her doctor. Writer called [facility] advice provided, patient verbalizes understanding; denies any other questions or concerns; instructed to call back for any new or worsening symptoms. Writer provided warm transfer to Mayda Portillo at [facility] for further assistance    Attention Provider: Thank you for allowing me to participate in the care of your patient. The patient was connected to triage in response to information provided to the [facility]. Please do not respond through this encounter as the response is not directed to a shared pool.       Reason for Disposition   Any history of prior \"blood clot\" in leg or lungs    Protocols used: Breathing Difficulty-ADULT-[place]

## 2023-01-05 ENCOUNTER — VIRTUAL VISIT (OUTPATIENT)
Dept: INTERNAL MEDICINE CLINIC | Age: 33
End: 2023-01-05
Payer: COMMERCIAL

## 2023-01-05 DIAGNOSIS — E53.8 VITAMIN B12 DEFICIENCY: ICD-10-CM

## 2023-01-05 DIAGNOSIS — I26.99 RECURRENT PULMONARY EMBOLI (HCC): Primary | ICD-10-CM

## 2023-01-05 DIAGNOSIS — F41.9 ANXIETY: ICD-10-CM

## 2023-01-05 DIAGNOSIS — Z79.01 LONG TERM (CURRENT) USE OF ANTICOAGULANTS: ICD-10-CM

## 2023-01-05 DIAGNOSIS — D64.9 ANEMIA, UNSPECIFIED TYPE: ICD-10-CM

## 2023-01-05 DIAGNOSIS — R03.0 ELEVATED BLOOD PRESSURE READING: ICD-10-CM

## 2023-01-05 PROCEDURE — 99214 OFFICE O/P EST MOD 30 MIN: CPT | Performed by: INTERNAL MEDICINE

## 2023-01-05 RX ORDER — FERROUS SULFATE 325(65) MG
325 TABLET, DELAYED RELEASE (ENTERIC COATED) ORAL 2 TIMES DAILY
Qty: 60 TABLET | Refills: 5 | Status: SHIPPED | OUTPATIENT
Start: 2023-01-05

## 2023-01-05 RX ORDER — PAROXETINE HYDROCHLORIDE 20 MG/1
20 TABLET, FILM COATED ORAL DAILY
Qty: 90 TABLET | Refills: 1 | Status: SHIPPED | OUTPATIENT
Start: 2023-01-05

## 2023-01-05 RX ORDER — LANOLIN ALCOHOL/MO/W.PET/CERES
1000 CREAM (GRAM) TOPICAL DAILY
Qty: 90 TABLET | Refills: 3 | Status: SHIPPED | OUTPATIENT
Start: 2023-01-05

## 2023-01-05 NOTE — PROGRESS NOTES
Chief Complaint   Patient presents with    Follow-up     Anxiety          1. \"Have you been to the ER, urgent care clinic since your last visit? Hospitalized since your last visit? \" No    2. \"Have you seen or consulted any other health care providers outside of the [address] [place] [address] since your last visit? \" No     3. For patients aged 39-70: Has the patient had a colonoscopy / FIT/ Cologuard? NA - based on age      If the patient is female:    4. For patients aged 41-77: Has the patient had a mammogram within the past 2 years? NA - based on age or sex      11. For patients aged 21-65: Has the patient had a pap smear?  Yes - no Care Gap present
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
## 2023-01-06 NOTE — ED NOTES
Discharge instructions were given to the patient by Jarad Blood RN. The patient left the Emergency Department ambulatory, alert and oriented and in no acute distress with 1 prescriptions. The patient was encouraged to call or return to the ED for worsening issues or problems and was encouraged to schedule a follow up appointment for continuing care. The patient verbalized understanding of discharge instructions and prescriptions, all questions were answered. The patient has no further concerns at this time. Pt to f/u with PCP and they may refer to vascular sx. Benzoyl Peroxide Pregnancy And Lactation Text: This medication is Pregnancy Category C. It is unknown if benzoyl peroxide is excreted in breast milk.

## 2023-02-17 ENCOUNTER — OFFICE VISIT (OUTPATIENT)
Dept: INTERNAL MEDICINE CLINIC | Age: 33
End: 2023-02-17
Payer: COMMERCIAL

## 2023-02-17 VITALS
OXYGEN SATURATION: 100 % | SYSTOLIC BLOOD PRESSURE: 137 MMHG | WEIGHT: 243 LBS | RESPIRATION RATE: 18 BRPM | TEMPERATURE: 98.3 F | HEIGHT: 66 IN | BODY MASS INDEX: 39.05 KG/M2 | HEART RATE: 107 BPM | DIASTOLIC BLOOD PRESSURE: 83 MMHG

## 2023-02-17 DIAGNOSIS — F41.9 ANXIETY: ICD-10-CM

## 2023-02-17 DIAGNOSIS — Z79.01 LONG TERM (CURRENT) USE OF ANTICOAGULANTS: ICD-10-CM

## 2023-02-17 DIAGNOSIS — Z86.711 HISTORY OF PULMONARY EMBOLISM: Primary | ICD-10-CM

## 2023-02-17 DIAGNOSIS — M25.562 ACUTE PAIN OF LEFT KNEE: ICD-10-CM

## 2023-02-17 DIAGNOSIS — D64.9 ANEMIA, UNSPECIFIED TYPE: ICD-10-CM

## 2023-02-17 DIAGNOSIS — E66.01 MORBID OBESITY (HCC): ICD-10-CM

## 2023-02-17 DIAGNOSIS — E53.8 VITAMIN B12 DEFICIENCY: ICD-10-CM

## 2023-02-17 RX ORDER — PAROXETINE HYDROCHLORIDE 20 MG/1
20 TABLET, FILM COATED ORAL DAILY
Qty: 90 TABLET | Refills: 1 | Status: SHIPPED | OUTPATIENT
Start: 2023-02-17

## 2023-02-17 RX ORDER — LANOLIN ALCOHOL/MO/W.PET/CERES
1000 CREAM (GRAM) TOPICAL DAILY
Qty: 90 TABLET | Refills: 3 | Status: SHIPPED | OUTPATIENT
Start: 2023-02-17

## 2023-02-17 RX ORDER — FERROUS SULFATE 325(65) MG
325 TABLET, DELAYED RELEASE (ENTERIC COATED) ORAL 2 TIMES DAILY
Qty: 60 TABLET | Refills: 5 | Status: SHIPPED | OUTPATIENT
Start: 2023-02-17

## 2023-02-17 NOTE — PROGRESS NOTES
Chief Complaint   Patient presents with    Blood Pressure Check    Anxiety       1. \"Have you been to the ER, urgent care clinic since your last visit? Hospitalized since your last visit? \" Yes When: 2/7/23 Where: [facility]  Reason for visit: fall    2. \"Have you seen or consulted any other health care providers outside of the [address] [place] [address] since your last visit? \" No     3. For patients aged 39-70: Has the patient had a colonoscopy / FIT/ Cologuard? NA - based on age      If the patient is female:    4. For patients aged 41-77: Has the patient had a mammogram within the past 2 years? NA - based on age or sex      11. For patients aged 21-65: Has the patient had a pap smear?  Yes - no Care Gap present
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
## 2023-02-17 NOTE — LETTER
NOTIFICATION RETURN TO WORK / SCHOOL    2/17/2023 2:50 PM    Ms. Brian Gandhi  [address] [address]      To Whom It May Concern:    Brian Gandhi is currently under the care of Larry Stevenson. She will return to work/school on: 3/6/2023. If there are questions or concerns please have the patient contact our office.         Sincerely,      Bart Singh MD

## 2023-05-07 PROCEDURE — 99284 EMERGENCY DEPT VISIT MOD MDM: CPT

## 2023-05-07 ASSESSMENT — PAIN SCALES - GENERAL: PAINLEVEL_OUTOF10: 10

## 2023-05-07 ASSESSMENT — PAIN DESCRIPTION - ORIENTATION: ORIENTATION: LEFT;LOWER;UPPER

## 2023-05-07 ASSESSMENT — PAIN DESCRIPTION - DESCRIPTORS: DESCRIPTORS: ACHING;SHOOTING;TINGLING

## 2023-05-07 ASSESSMENT — PAIN - FUNCTIONAL ASSESSMENT: PAIN_FUNCTIONAL_ASSESSMENT: 0-10

## 2023-05-07 ASSESSMENT — PAIN DESCRIPTION - FREQUENCY: FREQUENCY: CONTINUOUS

## 2023-05-07 ASSESSMENT — PAIN DESCRIPTION - ONSET: ONSET: SUDDEN

## 2023-05-08 ENCOUNTER — APPOINTMENT (OUTPATIENT)
Facility: HOSPITAL | Age: 33
End: 2023-05-08
Payer: COMMERCIAL

## 2023-05-08 ENCOUNTER — HOSPITAL ENCOUNTER (EMERGENCY)
Facility: HOSPITAL | Age: 33
Discharge: HOME | End: 2023-05-08
Attending: EMERGENCY MEDICINE
Payer: COMMERCIAL

## 2023-05-08 VITALS
SYSTOLIC BLOOD PRESSURE: 149 MMHG | TEMPERATURE: 98 F | OXYGEN SATURATION: 100 % | DIASTOLIC BLOOD PRESSURE: 88 MMHG | HEIGHT: 66 IN | BODY MASS INDEX: 39.37 KG/M2 | HEART RATE: 89 BPM | RESPIRATION RATE: 18 BRPM | WEIGHT: 245 LBS

## 2023-05-08 DIAGNOSIS — M25.562 ACUTE PAIN OF LEFT KNEE: ICD-10-CM
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
## 2023-05-08 DIAGNOSIS — S09.90XA CLOSED HEAD INJURY, INITIAL ENCOUNTER: ICD-10-CM

## 2023-05-08 DIAGNOSIS — M25.512 ACUTE PAIN OF LEFT SHOULDER: ICD-10-CM

## 2023-05-08 PROCEDURE — 73030 X-RAY EXAM OF SHOULDER: CPT

## 2023-05-08 PROCEDURE — 73562 X-RAY EXAM OF KNEE 3: CPT

## 2023-05-08 PROCEDURE — 6370000000 HC RX 637 (ALT 250 FOR IP): Performed by: EMERGENCY MEDICINE

## 2023-05-08 PROCEDURE — 70450 CT HEAD/BRAIN W/O DYE: CPT

## 2023-05-08 PROCEDURE — 72125 CT NECK SPINE W/O DYE: CPT

## 2023-05-08 RX ORDER — METHOCARBAMOL 500 MG/1
500 TABLET, FILM COATED ORAL ONCE
Status: COMPLETED | OUTPATIENT
Start: 2023-05-08 | End: 2023-05-08

## 2023-05-08 RX ORDER — METHOCARBAMOL 750 MG/1
750 TABLET, FILM COATED ORAL 3 TIMES DAILY
Qty: 15 TABLET | Refills: 0 | Status: SHIPPED | OUTPATIENT
Start: 2023-05-08 | End: 2023-05-13

## 2023-05-08 RX ORDER — OXYCODONE HYDROCHLORIDE AND ACETAMINOPHEN 5; 325 MG/1; MG/1
1 TABLET ORAL
Status: COMPLETED | OUTPATIENT
Start: 2023-05-08 | End: 2023-05-08

## 2023-05-08 RX ORDER — BUTALBITAL, ACETAMINOPHEN AND CAFFEINE 300; 40; 50 MG/1; MG/1; MG/1
1 CAPSULE ORAL EVERY 4 HOURS PRN
Qty: 20 CAPSULE | Refills: 0 | Status: SHIPPED | OUTPATIENT
Start: 2023-05-08

## 2023-05-08 RX ADMIN — METHOCARBAMOL TABLETS 500 MG: 500 TABLET, COATED ORAL at 00:33

## 2023-05-08 RX ADMIN — OXYCODONE HYDROCHLORIDE AND ACETAMINOPHEN 1 TABLET: 5; 325 TABLET ORAL at 00:33

## 2023-05-08 ASSESSMENT — LIFESTYLE VARIABLES
HOW MANY STANDARD DRINKS CONTAINING ALCOHOL DO YOU HAVE ON A TYPICAL DAY: PATIENT DOES NOT DRINK
HOW OFTEN DO YOU HAVE A DRINK CONTAINING ALCOHOL: NEVER

## 2023-05-08 ASSESSMENT — PAIN SCALES - GENERAL: PAINLEVEL_OUTOF10: 3

## 2023-05-08 NOTE — ED NOTES
Pt signed radiology form, denies chance of pregnancy. Radiology made aware.       Joshua Gr, DONALD  05/08/23 [id number]
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
## 2023-05-08 NOTE — ED NOTES
Pt finally registered. Discussed discharge instructions with the pt. Pt verbalized understanding.       Farley Krabbe, RN  05/08/23 0701

## 2023-05-08 NOTE — DISCHARGE INSTRUCTIONS
Thank You! It was a pleasure taking care of you in our Emergency Department today. We know that when you come to our Emergency Department, you are entrusting us with your health, comfort, and safety. Our physicians and nurses honor that trust, and truly appreciate the opportunity to care for you and your loved ones. We also value your feedback. If you receive a survey about your Emergency Department experience today, please fill it out. We care about our patients' feedback, and we listen to what you have to say. Thank you. Dr. Carlos Alberto Conn M.D.      ________________________________________________________________________  I have included a copy of your lab results and/or radiologic studies from today's visit so you can have them easily available at your follow-up visit. We hope you feel better and please do not hesitate to contact the ED if you have any questions at all! No results found for this or any previous visit (from the past 12 hour(s)). CT HEAD WO CONTRAST   Final Result      No acute intracranial abnormality. CT CERVICAL SPINE WO CONTRAST   Final Result   No acute abnormality. XR SHOULDER LEFT (MIN 2 VIEWS)   Final Result   No acute abnormality in the left shoulder or knee. XR KNEE LEFT (3 VIEWS)   Final Result   No acute abnormality in the left shoulder or knee. [unfilled]  The exam and treatment you received in the Emergency Department were for an urgent problem and are not intended as complete care. It is important that you follow up with a doctor, nurse practitioner, or physician assistant for ongoing care. If your symptoms become worse or you do not improve as expected and you are unable to reach your usual health care provider, you should return to the Emergency Department. We are available 24 hours a day. Please take your discharge instructions with you when you go to your follow-up appointment.      If a prescription has been provided, please have it filled

## 2023-05-08 NOTE — ED NOTES
Pt ambulatory to the bathroom with a steady gait. Pt reports feeling better.       Marcela Valverde RN  05/08/23 0140
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
## 2023-05-12 ASSESSMENT — ENCOUNTER SYMPTOMS
ABDOMINAL PAIN: 0
BACK PAIN: 0
SHORTNESS OF BREATH: 0

## 2023-06-22 ENCOUNTER — TELEMEDICINE (OUTPATIENT)
Facility: CLINIC | Age: 33
End: 2023-06-22
Payer: COMMERCIAL

## 2023-06-22 DIAGNOSIS — D50.0 IRON DEFICIENCY ANEMIA DUE TO CHRONIC BLOOD LOSS: ICD-10-CM

## 2023-06-22 DIAGNOSIS — Z79.01 LONG TERM (CURRENT) USE OF ANTICOAGULANTS: ICD-10-CM

## 2023-06-22 DIAGNOSIS — E66.9 OBESITY (BMI 35.0-39.9 WITHOUT COMORBIDITY): ICD-10-CM

## 2023-06-22 DIAGNOSIS — I82.409 RECURRENT ACUTE DEEP VEIN THROMBOSIS (DVT) OF LOWER EXTREMITY, UNSPECIFIED LATERALITY (HCC): Primary | ICD-10-CM

## 2023-06-22 PROCEDURE — 99214 OFFICE O/P EST MOD 30 MIN: CPT | Performed by: INTERNAL MEDICINE

## 2023-06-22 RX ORDER — LANOLIN ALCOHOL/MO/W.PET/CERES
325 CREAM (GRAM) TOPICAL
Qty: 90 TABLET | Refills: 3 | Status: SHIPPED | OUTPATIENT
Start: 2023-06-22

## 2023-06-22 RX ORDER — BUTALBITAL, ACETAMINOPHEN AND CAFFEINE 300; 40; 50 MG/1; MG/1; MG/1
1 CAPSULE ORAL EVERY 4 HOURS PRN
Qty: 20 CAPSULE | Refills: 0 | Status: CANCELLED | OUTPATIENT
Start: 2023-06-22

## 2023-06-22 NOTE — PROGRESS NOTES
Chief Complaint   Patient presents with    Blood Pressure Check    Tingling     Pt states she is now starting to feel tingling behind both knees and it is becoming concerning , pt also reports pain in both legs (6/10)      1. Have you been to the ER, urgent care clinic since your last visit? Hospitalized since your last visit? No    2. Have you seen or consulted any other health care providers outside of the [address] [place] [address] since your last visit? Include any pap smears or colon screening.  No
Drug use: Not Currently            PHYSICAL EXAMINATION:  [ INSTRUCTIONS:  \"[x]\" Indicates a positive item  \"[]\" Indicates a negative item  -- DELETE ALL ITEMS NOT EXAMINED]  Vital Signs: (As obtained by patient/caregiver or practitioner observation)    Blood pressure-  Heart rate-    Respiratory rate-    Temperature-  Pulse oximetry-     Constitutional: [] Appears well-developed and well-nourished [] No apparent distress      [] Abnormal-   Mental status  [x] Alert and awake  [] Oriented to person/place/time []Able to follow commands      Eyes:  EOM    [x]  Normal  [] Abnormal-  Sclera  []  Normal  [] Abnormal -         Discharge []  None visible  [] Abnormal -    HENT:   [x] Normocephalic, atraumatic. [] Abnormal   [] Mouth/Throat: Mucous membranes are moist.     External Ears [] Normal  [] Abnormal-     Neck: [] No visualized mass     Pulmonary/Chest: [] Respiratory effort normal.  [] No visualized signs of difficulty breathing or respiratory distress        [] Abnormal-      Musculoskeletal:   [] Normal gait with no signs of ataxia         [] Normal range of motion of neck        [] Abnormal-       Neurological:        [] No Facial Asymmetry (Cranial nerve 7 motor function) (limited exam to video visit)          [] No gaze palsy        [] Abnormal-         Skin:        [] No significant exanthematous lesions or discoloration noted on facial skin         [] Abnormal-            Psychiatric:       [] Normal Affect [] No Hallucinations        [] Abnormal-     Other pertinent observable physical exam findings-     ASSESSMENT/PLAN:  1. Recurrent acute deep vein thrombosis (DVT) of lower extremity, unspecified laterality (HCC)    - rivaroxaban (XARELTO) 20 MG TABS tablet; Take 1 tablet by mouth Daily with supper  Dispense: 90 tablet; Refill: 3    2. Long term (current) use of anticoagulants    - rivaroxaban (XARELTO) 20 MG TABS tablet; Take 1 tablet by mouth Daily with supper  Dispense: 90 tablet; Refill: 3    3.

## 2023-07-18 ENCOUNTER — NURSE TRIAGE (OUTPATIENT)
Dept: OTHER | Facility: CLINIC | Age: 33
End: 2023-07-18

## 2023-07-18 ENCOUNTER — OFFICE VISIT (OUTPATIENT)
Facility: CLINIC | Age: 33
End: 2023-07-18
Payer: COMMERCIAL

## 2023-07-18 VITALS — DIASTOLIC BLOOD PRESSURE: 86 MMHG | SYSTOLIC BLOOD PRESSURE: 131 MMHG | HEART RATE: 82 BPM | OXYGEN SATURATION: 96 %

## 2023-07-18 DIAGNOSIS — N92.0 MENORRHAGIA WITH REGULAR CYCLE: ICD-10-CM

## 2023-07-18 DIAGNOSIS — F41.9 ANXIETY: ICD-10-CM

## 2023-07-18 DIAGNOSIS — E66.01 MORBID OBESITY (HCC): ICD-10-CM

## 2023-07-18 DIAGNOSIS — G47.33 OSA (OBSTRUCTIVE SLEEP APNEA): ICD-10-CM

## 2023-07-18 DIAGNOSIS — Z79.01 LONG TERM (CURRENT) USE OF ANTICOAGULANTS: ICD-10-CM

## 2023-07-18 DIAGNOSIS — I26.99 RECURRENT PULMONARY EMBOLI (HCC): ICD-10-CM

## 2023-07-18 DIAGNOSIS — I82.409 RECURRENT ACUTE DEEP VEIN THROMBOSIS (DVT) OF LOWER EXTREMITY, UNSPECIFIED LATERALITY (HCC): Primary | ICD-10-CM

## 2023-07-18 PROCEDURE — 99214 OFFICE O/P EST MOD 30 MIN: CPT | Performed by: INTERNAL MEDICINE

## 2023-07-18 RX ORDER — ALBUTEROL SULFATE 90 UG/1
2 AEROSOL, METERED RESPIRATORY (INHALATION) 4 TIMES DAILY PRN
Qty: 18 G | Refills: 0 | Status: SHIPPED | OUTPATIENT
Start: 2023-07-18

## 2023-07-18 ASSESSMENT — PATIENT HEALTH QUESTIONNAIRE - PHQ9
2. FEELING DOWN, DEPRESSED OR HOPELESS: 0
SUM OF ALL RESPONSES TO PHQ QUESTIONS 1-9: 0
SUM OF ALL RESPONSES TO PHQ QUESTIONS 1-9: 0
SUM OF ALL RESPONSES TO PHQ9 QUESTIONS 1 & 2: 0
SUM OF ALL RESPONSES TO PHQ QUESTIONS 1-9: 0
SUM OF ALL RESPONSES TO PHQ QUESTIONS 1-9: 0
1. LITTLE INTEREST OR PLEASURE IN DOING THINGS: 0

## 2023-07-18 NOTE — TELEPHONE ENCOUNTER
Location of patient: [address] [facility] call from [place] at [facility] with [employer]. Subjective: Caller states \"BP dropped to 75/45 at work then went up to 180/121. This all occurred around midnight to 1:30 this morning. \"     Current Symptoms: while at work this evening BP dropped to 75/45 at 12:45am.  Then at 1:15 it went up to 180/121. BP has been high in the past, currently no medication. Currently feeling tired, feels a little winded. No chest pain  No illness  Not able to check BP at this time. Onset: early this morning     Associated Symptoms: NA    Pain Severity: denies    Temperature: denies     What has been tried:     LMP: NA Pregnant: No    Recommended disposition: See in Office Today    Care advice provided, patient verbalizes understanding; denies any other questions or concerns; instructed to call back for any new or worsening symptoms. Patient/Caller agrees with recommended disposition; writer provided warm transfer to Amanda Morris at [facility] for appointment scheduling    Attention Provider: Thank you for allowing me to participate in the care of your patient. The patient was connected to triage in response to information provided to the ECC/PSC. Please do not respond through this encounter as the response is not directed to a shared pool.       Reason for Disposition   Systolic BP >= 408 OR Diastolic >= 913    Protocols used: Blood Pressure - High-ADULT-OH

## 2023-07-18 NOTE — PROGRESS NOTES
Jeffrey Meneses is a 27 y.o. female and presents with   Chief Complaint   Patient presents with    Dizziness    Back Pain     Pt noticed stated last night she started to feel winded blood pressure was low then was elevated a hour later. Pt stated she will like to discuss Albuterol inhaler. .  Subjective:    Pt reports bp check at work ~70/40. Repeat after rest >376 systolic  Her bp was checked bc she didn't feel well and a nurse mentioned she looked tired. Pt is currently asymptomatic    Pt reports compliance w her xarelto      PMH:   Anemia  Lab Results   Component Value Date    WBC 13.4 (H) 11/02/2022    HGB 9.3 (L) 11/02/2022    HCT 31.4 (L) 11/02/2022    MCV 77.5 (L) 11/02/2022     11/02/2022         Recurrent DVT, popliteal- dx'ed 7/14/2021    -pt has a h/o non-compliance w w AC    -lifelong AC as confirmed by heme       Elevated blood pressure-  BP Readings from Last 3 Encounters:   02/17/23 137/83   10/07/22 133/76   07/05/22 (!) 145/88      TOSHIA on cpap, uses \"sometimes\"     Obesity-  Wt Readings from Last 3 Encounters:   02/17/23 243 lb (110.2 kg)   10/07/22 246 lb (111.6 kg)   07/05/22 240 lb (108.9 kg)      Scoliosis-   \"Arrythmia\"    Review of Systems  Review of systems (12) negative, except noted above. Objective:  Visit Vitals  Vitals:    07/18/23 1429   BP: 131/86   Pulse: 82   SpO2: 96%      Physical Exam:   General appearance - alert, obese, and in no distress. Mental status - alert, oriented to person, place, and time  EYE-EOMI  Neck - supple,   Chest - CTA [place] NO wheezes  [facility]-rrr NOT tachy  Abdomen - obese  Ext-trace edema [place] ankles  Skin-Warm and dry. no hyperpigmentation, vitiligo, or suspicious lesions  Neuro -alert, oriented, normal speech, no focal findings or movement disorder noted          Assessment/Plan:       Diagnosis Orders   1. Recurrent acute deep vein thrombosis (DVT) of lower extremity, unspecified laterality ([address])        2.  Recurrent pulmonary emboli

## 2023-07-18 NOTE — PROGRESS NOTES
Patient identified with two identification factors, Name and Date of Birth. Chief Complaint   Patient presents with    Dizziness    Back Pain     Pt noticed stated last night she started to feel winded blood pressure was low then was elevated a hour later. Pt stated she will like to discuss Albuterol inhaler. /86 (Site: Right Upper Arm, Position: Sitting, Cuff Size: Large Adult)   Pulse 82   LMP 07/07/2023 (Exact Date)   SpO2 96%       1. \"Have you been to the ER, urgent care clinic since your last visit? Hospitalized since your last visit? \" No     2. \"Have you seen or consulted any other health care providers outside of the [address] [place] [address] since your last visit? \" No

## 2024-04-05 ENCOUNTER — HOSPITAL ENCOUNTER (EMERGENCY)
Facility: HOSPITAL | Age: 34
Discharge: HOME | End: 2024-04-05
Attending: EMERGENCY MEDICINE
Payer: MEDICAID

## 2024-04-05 ENCOUNTER — APPOINTMENT (OUTPATIENT)
Dept: VASCULAR SURGERY | Facility: HOSPITAL | Age: 34
End: 2024-04-05
Payer: MEDICAID

## 2024-04-05 VITALS
DIASTOLIC BLOOD PRESSURE: 99 MMHG | HEIGHT: 67 IN | RESPIRATION RATE: 18 BRPM | TEMPERATURE: 98.3 F | OXYGEN SATURATION: 99 % | SYSTOLIC BLOOD PRESSURE: 149 MMHG | BODY MASS INDEX: 37.35 KG/M2 | WEIGHT: 238 LBS | HEART RATE: 85 BPM

## 2024-04-05 DIAGNOSIS — D64.9 ANEMIA, UNSPECIFIED TYPE: ICD-10-CM

## 2024-04-05 DIAGNOSIS — S86.811A STRAIN OF CALF MUSCLE, RIGHT, INITIAL ENCOUNTER: ICD-10-CM

## 2024-04-05 DIAGNOSIS — Z91.148 PATIENT NONCOMPLIANT WITH ANTICOAGULANT MEDICATION: Primary | ICD-10-CM

## 2024-04-05 LAB
ANION GAP SERPL CALC-SCNC: 3 MMOL/L (ref 5–15)
BASOPHILS # BLD: 0.1 K/UL (ref 0–0.1)
BASOPHILS NFR BLD: 1 % (ref 0–1)
BUN SERPL-MCNC: 10 MG/DL (ref 6–20)
BUN/CREAT SERPL: 15 (ref 12–20)
CALCIUM SERPL-MCNC: 9.5 MG/DL (ref 8.5–10.1)
CHLORIDE SERPL-SCNC: 104 MMOL/L (ref 97–108)
CO2 SERPL-SCNC: 30 MMOL/L (ref 21–32)
CREAT SERPL-MCNC: 0.66 MG/DL (ref 0.55–1.02)
DIFFERENTIAL METHOD BLD: ABNORMAL
ECHO BSA: 2.26 M2
EOSINOPHIL # BLD: 0.1 K/UL (ref 0–0.4)
EOSINOPHIL NFR BLD: 1 % (ref 0–7)
ERYTHROCYTE [DISTWIDTH] IN BLOOD BY AUTOMATED COUNT: 19.9 % (ref 11.5–14.5)
GLUCOSE SERPL-MCNC: 94 MG/DL (ref 65–100)
HCT VFR BLD AUTO: 29.6 % (ref 35–47)
HGB BLD-MCNC: 8.8 G/DL (ref 11.5–16)
IMM GRANULOCYTES # BLD AUTO: 0.1 K/UL (ref 0–0.04)
IMM GRANULOCYTES NFR BLD AUTO: 0 % (ref 0–0.5)
LYMPHOCYTES # BLD: 3 K/UL (ref 0.8–3.5)
LYMPHOCYTES NFR BLD: 26 % (ref 12–49)
MCH RBC QN AUTO: 22.2 PG (ref 26–34)
MCHC RBC AUTO-ENTMCNC: 29.7 G/DL (ref 30–36.5)
MCV RBC AUTO: 74.7 FL (ref 80–99)
MONOCYTES # BLD: 0.9 K/UL (ref 0–1)
MONOCYTES NFR BLD: 8 % (ref 5–13)
NEUTS SEG # BLD: 7.4 K/UL (ref 1.8–8)
NEUTS SEG NFR BLD: 64 % (ref 32–75)
NRBC # BLD: 0.02 K/UL (ref 0–0.01)
NRBC BLD-RTO: 0.2 PER 100 WBC
PLATELET # BLD AUTO: 354 K/UL (ref 150–400)
PMV BLD AUTO: 10 FL (ref 8.9–12.9)
POTASSIUM SERPL-SCNC: 3.9 MMOL/L (ref 3.5–5.1)
RBC # BLD AUTO: 3.96 M/UL (ref 3.8–5.2)
SODIUM SERPL-SCNC: 137 MMOL/L (ref 136–145)
WBC # BLD AUTO: 11.6 K/UL (ref 3.6–11)

## 2024-04-05 PROCEDURE — 6370000000 HC RX 637 (ALT 250 FOR IP): Performed by: STUDENT IN AN ORGANIZED HEALTH CARE EDUCATION/TRAINING PROGRAM

## 2024-04-05 PROCEDURE — 85025 COMPLETE CBC W/AUTO DIFF WBC: CPT

## 2024-04-05 PROCEDURE — 36415 COLL VENOUS BLD VENIPUNCTURE: CPT

## 2024-04-05 PROCEDURE — 93971 EXTREMITY STUDY: CPT

## 2024-04-05 PROCEDURE — 80048 BASIC METABOLIC PNL TOTAL CA: CPT

## 2024-04-05 PROCEDURE — 99283 EMERGENCY DEPT VISIT LOW MDM: CPT

## 2024-04-05 RX ORDER — PREDNISONE 10 MG/1
TABLET ORAL
Qty: 1 EACH | Refills: 0 | Status: SHIPPED | OUTPATIENT
Start: 2024-04-05

## 2024-04-05 RX ORDER — OXYCODONE HYDROCHLORIDE AND ACETAMINOPHEN 5; 325 MG/1; MG/1
1 TABLET ORAL
Status: COMPLETED | OUTPATIENT
Start: 2024-04-05 | End: 2024-04-05

## 2024-04-05 RX ADMIN — OXYCODONE AND ACETAMINOPHEN 1 TABLET: 5; 325 TABLET ORAL at 21:09

## 2024-04-05 ASSESSMENT — PAIN - FUNCTIONAL ASSESSMENT
PAIN_FUNCTIONAL_ASSESSMENT: 0-10
PAIN_FUNCTIONAL_ASSESSMENT: ACTIVITIES ARE NOT PREVENTED

## 2024-04-05 ASSESSMENT — ENCOUNTER SYMPTOMS
NAUSEA: 0
DIARRHEA: 0
COUGH: 0
SHORTNESS OF BREATH: 0
ABDOMINAL PAIN: 0
BACK PAIN: 0
VOMITING: 0
WHEEZING: 0
SORE THROAT: 0
EYE DISCHARGE: 0
COLOR CHANGE: 0
RHINORRHEA: 0

## 2024-04-05 ASSESSMENT — PAIN DESCRIPTION - PAIN TYPE: TYPE: ACUTE PAIN

## 2024-04-05 ASSESSMENT — PAIN DESCRIPTION - DESCRIPTORS: DESCRIPTORS: ACHING

## 2024-04-05 ASSESSMENT — PAIN DESCRIPTION - ORIENTATION: ORIENTATION: RIGHT

## 2024-04-05 ASSESSMENT — PAIN SCALES - GENERAL: PAINLEVEL_OUTOF10: 8

## 2024-04-05 ASSESSMENT — PAIN DESCRIPTION - LOCATION: LOCATION: LEG

## 2024-04-05 NOTE — ED PROVIDER NOTES
ordered.    Risk  Prescription drug management.            REASSESSMENT            CONSULTS:  None    PROCEDURES:  Unless otherwise noted below, none     Procedures      FINAL IMPRESSION      1. Patient noncompliant with anticoagulant medication    2. Strain of calf muscle, right, initial encounter    3. Anemia, unspecified type          DISPOSITION/PLAN   DISPOSITION Decision To Discharge 04/05/2024 08:45:44 PM      PATIENT REFERRED TO:  Sonya Abreu MD  [address] [place] [id number] [phone]    Schedule an appointment as soon as possible for a visit       [facility] EMERGENCY DEPT  [address] [place] [address]  [phone]  Go to   If symptoms worsen      DISCHARGE MEDICATIONS:  New Prescriptions    PREDNISONE 10 MG (21) TBPK    Follow instructions in dose pack.         (Please note that portions of this note were completed with a voice recognition program.  Efforts were made to edit the dictations but occasionally words are mis-transcribed.)    MODESTA Haque (electronically signed)  Physician Assistant        Samina Rowell PA  04/05/24 [id number]

## 2024-04-05 NOTE — ED TRIAGE NOTES
Patient arrives ambulatory with a complain of body weakness/pain. Patient reports she took Tylenol and other muscle relaxant but it did not help. Denies any nausea, chest pain, vomiting, fever, headache and loss of consciousness. Patient is currently taking Xarelto.

## 2024-04-06 NOTE — ED NOTES
Pt was discharged at this time.  pt verbalized understanding of all discharge instructions. Pt remains a&ox3. Resps are even and unlabored. Skin warm and dry. No distress noted. Pt ambulated out of ed with a steady gait.

## 2024-04-06 NOTE — DISCHARGE INSTRUCTIONS
No evidence of a DVT today.  This is likely a pulled muscle.  Recommend taking prednisone Dosepak as prescribed.  Perform exercises with information provided.  Apply ice to your calf for 30 minutes every 2-3 hours as needed for pain.  Take Xarelto as prescribed.  Do not stop taking Xarelto for your menstrual cycle.  Please follow-up with your primary care physician given noncompliance with this medication.  Also recommend follow-up with your OB/GYN given heavy menstrual cycles.  If you develop worsening calf pain, shortness of breath, chest pain or other new concerning symptoms return to the ER.

## 2024-04-09 LAB — ECHO BSA: 2.26 M2

## 2024-06-12 ENCOUNTER — APPOINTMENT (OUTPATIENT)
Facility: HOSPITAL | Age: 34
End: 2024-06-12
Payer: MEDICAID

## 2024-06-12 ENCOUNTER — HOSPITAL ENCOUNTER (EMERGENCY)
Facility: HOSPITAL | Age: 34
Discharge: HOME | End: 2024-06-12
Attending: EMERGENCY MEDICINE
Payer: MEDICAID

## 2024-06-12 VITALS
RESPIRATION RATE: 16 BRPM | WEIGHT: 250.66 LBS | OXYGEN SATURATION: 96 % | TEMPERATURE: 97.1 F | HEART RATE: 93 BPM | BODY MASS INDEX: 39.26 KG/M2 | SYSTOLIC BLOOD PRESSURE: 140 MMHG | DIASTOLIC BLOOD PRESSURE: 89 MMHG

## 2024-06-12 DIAGNOSIS — J98.4 PNEUMONITIS: Primary | ICD-10-CM

## 2024-06-12 DIAGNOSIS — I26.99 PULMONARY INFARCT (HCC): ICD-10-CM

## 2024-06-12 LAB
ALBUMIN SERPL-MCNC: 3.1 G/DL (ref 3.5–5)
ALBUMIN/GLOB SERPL: 0.6 (ref 1.1–2.2)
ALP SERPL-CCNC: 78 U/L (ref 45–117)
ALT SERPL-CCNC: 19 U/L (ref 12–78)
ANION GAP SERPL CALC-SCNC: 9 MMOL/L (ref 5–15)
APPEARANCE UR: ABNORMAL
AST SERPL-CCNC: 17 U/L (ref 15–37)
BASOPHILS # BLD: 0.1 K/UL (ref 0–0.1)
BASOPHILS NFR BLD: 0 % (ref 0–1)
BILIRUB SERPL-MCNC: 0.3 MG/DL (ref 0.2–1)
BILIRUB UR QL: NEGATIVE
BUN SERPL-MCNC: 5 MG/DL (ref 6–20)
BUN/CREAT SERPL: 8 (ref 12–20)
CALCIUM SERPL-MCNC: 9.9 MG/DL (ref 8.5–10.1)
CHLORIDE SERPL-SCNC: 105 MMOL/L (ref 97–108)
CO2 SERPL-SCNC: 23 MMOL/L (ref 21–32)
COLOR UR: ABNORMAL
COMMENT:: NORMAL
CREAT SERPL-MCNC: 0.59 MG/DL (ref 0.55–1.02)
DIFFERENTIAL METHOD BLD: ABNORMAL
EKG ATRIAL RATE: 103 BPM
EKG DIAGNOSIS: NORMAL
EKG P AXIS: 61 DEGREES
EKG P-R INTERVAL: 152 MS
EKG Q-T INTERVAL: 350 MS
EKG QRS DURATION: 80 MS
EKG QTC CALCULATION (BAZETT): 458 MS
EKG R AXIS: 37 DEGREES
EKG T AXIS: -14 DEGREES
EKG VENTRICULAR RATE: 103 BPM
EOSINOPHIL # BLD: 0.2 K/UL (ref 0–0.4)
EOSINOPHIL NFR BLD: 1 % (ref 0–7)
EPITH CASTS URNS QL MICRO: ABNORMAL /LPF
ERYTHROCYTE [DISTWIDTH] IN BLOOD BY AUTOMATED COUNT: 18.6 % (ref 11.5–14.5)
GLOBULIN SER CALC-MCNC: 5.6 G/DL (ref 2–4)
GLUCOSE SERPL-MCNC: 104 MG/DL (ref 65–100)
GLUCOSE UR STRIP.AUTO-MCNC: NEGATIVE MG/DL
HCG SERPL QL: NEGATIVE
HCG UR QL: NEGATIVE
HCT VFR BLD AUTO: 29.3 % (ref 35–47)
HGB BLD-MCNC: 9 G/DL (ref 11.5–16)
HGB UR QL STRIP: ABNORMAL
HYALINE CASTS URNS QL MICRO: ABNORMAL /LPF (ref 0–5)
IMM GRANULOCYTES # BLD AUTO: 0.1 K/UL (ref 0–0.04)
IMM GRANULOCYTES NFR BLD AUTO: 0 % (ref 0–0.5)
KETONES UR QL STRIP.AUTO: NEGATIVE MG/DL
LEUKOCYTE ESTERASE UR QL STRIP.AUTO: ABNORMAL
LIPASE SERPL-CCNC: 27 U/L (ref 13–75)
LYMPHOCYTES # BLD: 3 K/UL (ref 0.8–3.5)
LYMPHOCYTES NFR BLD: 22 % (ref 12–49)
MCH RBC QN AUTO: 22.5 PG (ref 26–34)
MCHC RBC AUTO-ENTMCNC: 30.7 G/DL (ref 30–36.5)
MCV RBC AUTO: 73.3 FL (ref 80–99)
MONOCYTES # BLD: 1.1 K/UL (ref 0–1)
MONOCYTES NFR BLD: 8 % (ref 5–13)
NEUTS SEG # BLD: 9.2 K/UL (ref 1.8–8)
NEUTS SEG NFR BLD: 69 % (ref 32–75)
NITRITE UR QL STRIP.AUTO: NEGATIVE
NRBC # BLD: 0.02 K/UL (ref 0–0.01)
NRBC BLD-RTO: 0.1 PER 100 WBC
PH UR STRIP: 6 (ref 5–8)
PLATELET # BLD AUTO: 272 K/UL (ref 150–400)
PMV BLD AUTO: 9.6 FL (ref 8.9–12.9)
POTASSIUM SERPL-SCNC: 3.4 MMOL/L (ref 3.5–5.1)
PROT SERPL-MCNC: 8.7 G/DL (ref 6.4–8.2)
PROT UR STRIP-MCNC: ABNORMAL MG/DL
RBC # BLD AUTO: 4 M/UL (ref 3.8–5.2)
RBC #/AREA URNS HPF: ABNORMAL /HPF (ref 0–5)
SODIUM SERPL-SCNC: 137 MMOL/L (ref 136–145)
SP GR UR REFRACTOMETRY: 1.02 (ref 1–1.03)
SPECIMEN HOLD: NORMAL
TROPONIN I SERPL HS-MCNC: <4 NG/L (ref 0–51)
UROBILINOGEN UR QL STRIP.AUTO: 0.2 EU/DL (ref 0.2–1)
WBC # BLD AUTO: 13.6 K/UL (ref 3.6–11)
WBC URNS QL MICRO: ABNORMAL /HPF (ref 0–4)

## 2024-06-12 PROCEDURE — 36415 COLL VENOUS BLD VENIPUNCTURE: CPT

## 2024-06-12 PROCEDURE — 84484 ASSAY OF TROPONIN QUANT: CPT

## 2024-06-12 PROCEDURE — 93010 ELECTROCARDIOGRAM REPORT: CPT | Performed by: INTERNAL MEDICINE

## 2024-06-12 PROCEDURE — 84703 CHORIONIC GONADOTROPIN ASSAY: CPT

## 2024-06-12 PROCEDURE — 80053 COMPREHEN METABOLIC PANEL: CPT

## 2024-06-12 PROCEDURE — 99285 EMERGENCY DEPT VISIT HI MDM: CPT

## 2024-06-12 PROCEDURE — 6360000004 HC RX CONTRAST MEDICATION: Performed by: RADIOLOGY

## 2024-06-12 PROCEDURE — 93005 ELECTROCARDIOGRAM TRACING: CPT | Performed by: EMERGENCY MEDICINE

## 2024-06-12 PROCEDURE — 71275 CT ANGIOGRAPHY CHEST: CPT

## 2024-06-12 PROCEDURE — 81001 URINALYSIS AUTO W/SCOPE: CPT

## 2024-06-12 PROCEDURE — 83690 ASSAY OF LIPASE: CPT

## 2024-06-12 PROCEDURE — 81025 URINE PREGNANCY TEST: CPT

## 2024-06-12 PROCEDURE — 74177 CT ABD & PELVIS W/CONTRAST: CPT

## 2024-06-12 PROCEDURE — 85025 COMPLETE CBC W/AUTO DIFF WBC: CPT

## 2024-06-12 RX ORDER — AZITHROMYCIN 250 MG/1
TABLET, FILM COATED ORAL
Qty: 1 PACKET | Refills: 0 | Status: ON HOLD | OUTPATIENT
Start: 2024-06-12 | End: 2024-06-16

## 2024-06-12 RX ORDER — AMOXICILLIN AND CLAVULANATE POTASSIUM 875; 125 MG/1; MG/1
1 TABLET, FILM COATED ORAL 2 TIMES DAILY
Qty: 14 TABLET | Refills: 0 | Status: ON HOLD | OUTPATIENT
Start: 2024-06-12 | End: 2024-06-19

## 2024-06-12 RX ADMIN — IOPAMIDOL 100 ML: 755 INJECTION, SOLUTION INTRAVENOUS at 18:59

## 2024-06-12 ASSESSMENT — PAIN DESCRIPTION - LOCATION: LOCATION: ABDOMEN

## 2024-06-12 ASSESSMENT — PAIN - FUNCTIONAL ASSESSMENT: PAIN_FUNCTIONAL_ASSESSMENT: 0-10

## 2024-06-12 ASSESSMENT — PAIN SCALES - GENERAL: PAINLEVEL_OUTOF10: 7

## 2024-06-12 ASSESSMENT — PAIN DESCRIPTION - ORIENTATION: ORIENTATION: RIGHT;LOWER

## 2024-06-12 NOTE — ED PROVIDER NOTES
[facility] EMERGENCY DEP  EMERGENCY DEPARTMENT ENCOUNTER      Pt Name: Diana Snell  MRN: [id number]  Birthdate 1990  Date of evaluation: 6/12/2024  Provider: Kateryna Mazariegos PA-C    CHIEF COMPLAINT       Chief Complaint   Patient presents with    Abdominal Pain    Chest Pain         HISTORY OF PRESENT ILLNESS    HPI  Patient is a 33 y.o. female who presents today with complaints of right lower chest pain, pleuritic. She also has middle chest pain that is worse with drinking. She also has RLQ abdominal pain. No recent trauma or injury. No history of surgery. She does have anemia. She is on Xarelto for DVT/PE she had in 2019. History of ovarian cancer. No hemoptysis. No lower extremity pain or swelling.     Nursing Notes were reviewed.      REVIEW OF SYSTEMS       Review of Systems   Cardiovascular:  Positive for chest pain.   Gastrointestinal:  Positive for abdominal pain.       Except as noted above the remainder of the review of systems was reviewed and negative.       PAST MEDICAL HISTORY     Past Medical History:   Diagnosis Date    Cancer (HCC)     ovarian     Hypertension     with pregnancy    Ill-defined condition     JAIME 1 with last pap    Ill-defined condition     MS         SURGICAL HISTORY     No past surgical history on file.      CURRENT MEDICATIONS       Discharge Medication List as of 6/12/2024 10:00 PM        CONTINUE these medications which have NOT CHANGED    Details   predniSONE 10 MG (21) TBPK Follow instructions in dose pack., Disp-1 each, R-0Normal      albuterol sulfate HFA (VENTOLIN HFA) 108 (90 Base) MCG/ACT inhaler Inhale 2 puffs into the lungs 4 times daily as needed for Wheezing, Disp-18 g, R-0Normal      cyanocobalamin 1000 MCG tablet Take 1 tablet by mouth daily, Disp-90 tablet, R-3Normal      ferrous sulfate (FE TABS 325) 325 (65 Fe) MG EC tablet Take 1 tablet by mouth daily (with breakfast), Disp-90 tablet, R-3Normal      rivaroxaban (XARELTO) 20 MG TABS tablet Take 1 tablet by

## 2024-06-12 NOTE — ED TRIAGE NOTES
Patient presents from home with complaints of right sided abdominal pain and chest pain. Patient reports the abdominal pain started first and then she started having the chest pain over the last few days. History of PE and is on blood thinners

## 2024-06-13 ENCOUNTER — HOSPITAL ENCOUNTER (INPATIENT)
Facility: HOSPITAL | Age: 34
LOS: 8 days | Discharge: HOME | End: 2024-06-21
Attending: EMERGENCY MEDICINE | Admitting: STUDENT IN AN ORGANIZED HEALTH CARE EDUCATION/TRAINING PROGRAM
Payer: MEDICAID

## 2024-06-13 ENCOUNTER — APPOINTMENT (OUTPATIENT)
Facility: HOSPITAL | Age: 34
End: 2024-06-13
Payer: MEDICAID

## 2024-06-13 DIAGNOSIS — J96.20 ACUTE AND CHR RESP FAILURE, UNSP W HYPOXIA OR HYPERCAPNIA (HCC): Primary | ICD-10-CM

## 2024-06-13 DIAGNOSIS — I26.99 ACUTE PULMONARY EMBOLISM WITHOUT ACUTE COR PULMONALE, UNSPECIFIED PULMONARY EMBOLISM TYPE (HCC): ICD-10-CM

## 2024-06-13 DIAGNOSIS — J18.9 PNEUMONIA DUE TO INFECTIOUS ORGANISM, UNSPECIFIED LATERALITY, UNSPECIFIED PART OF LUNG: ICD-10-CM

## 2024-06-13 LAB
ALBUMIN SERPL-MCNC: 3.2 G/DL (ref 3.5–5)
ALBUMIN/GLOB SERPL: 0.6 (ref 1.1–2.2)
ALP SERPL-CCNC: 85 U/L (ref 45–117)
ALT SERPL-CCNC: 18 U/L (ref 12–78)
ANION GAP SERPL CALC-SCNC: 7 MMOL/L (ref 5–15)
APPEARANCE UR: CLEAR
APTT PPP: 28.8 SEC (ref 22.1–31)
AST SERPL-CCNC: 22 U/L (ref 15–37)
BACTERIA URNS QL MICRO: ABNORMAL /HPF
BASOPHILS # BLD: 0 K/UL (ref 0–0.1)
BASOPHILS NFR BLD: 0 % (ref 0–1)
BILIRUB SERPL-MCNC: 0.4 MG/DL (ref 0.2–1)
BILIRUB UR QL: NEGATIVE
BUN SERPL-MCNC: 7 MG/DL (ref 6–20)
BUN/CREAT SERPL: 10 (ref 12–20)
CALCIUM SERPL-MCNC: 9.9 MG/DL (ref 8.5–10.1)
CHLORIDE SERPL-SCNC: 101 MMOL/L (ref 97–108)
CO2 SERPL-SCNC: 24 MMOL/L (ref 21–32)
COLOR UR: ABNORMAL
COMMENT:: NORMAL
CREAT SERPL-MCNC: 0.67 MG/DL (ref 0.55–1.02)
DIFFERENTIAL METHOD BLD: ABNORMAL
EOSINOPHIL # BLD: 0.1 K/UL (ref 0–0.4)
EOSINOPHIL NFR BLD: 0 % (ref 0–7)
EPITH CASTS URNS QL MICRO: ABNORMAL /LPF
ERYTHROCYTE [DISTWIDTH] IN BLOOD BY AUTOMATED COUNT: 18.5 % (ref 11.5–14.5)
ERYTHROCYTE [DISTWIDTH] IN BLOOD BY AUTOMATED COUNT: 19.1 % (ref 11.5–14.5)
GLOBULIN SER CALC-MCNC: 5.5 G/DL (ref 2–4)
GLUCOSE BLD STRIP.AUTO-MCNC: 112 MG/DL (ref 65–117)
GLUCOSE SERPL-MCNC: 122 MG/DL (ref 65–100)
GLUCOSE UR STRIP.AUTO-MCNC: NEGATIVE MG/DL
HCT VFR BLD AUTO: 27 % (ref 35–47)
HCT VFR BLD AUTO: 30.5 % (ref 35–47)
HGB BLD-MCNC: 8 G/DL (ref 11.5–16)
HGB BLD-MCNC: 9.1 G/DL (ref 11.5–16)
HGB UR QL STRIP: NEGATIVE
HYALINE CASTS URNS QL MICRO: ABNORMAL /LPF (ref 0–2)
IMM GRANULOCYTES # BLD AUTO: 0.1 K/UL (ref 0–0.04)
IMM GRANULOCYTES NFR BLD AUTO: 0 % (ref 0–0.5)
INR PPP: 1.1 (ref 0.9–1.1)
KETONES UR QL STRIP.AUTO: 40 MG/DL
LACTATE SERPL-SCNC: 1 MMOL/L (ref 0.4–2)
LEUKOCYTE ESTERASE UR QL STRIP.AUTO: NEGATIVE
LYMPHOCYTES # BLD: 1.8 K/UL (ref 0.8–3.5)
LYMPHOCYTES NFR BLD: 9 % (ref 12–49)
MAGNESIUM SERPL-MCNC: 1.9 MG/DL (ref 1.6–2.4)
MCH RBC QN AUTO: 22 PG (ref 26–34)
MCH RBC QN AUTO: 22.5 PG (ref 26–34)
MCHC RBC AUTO-ENTMCNC: 29.6 G/DL (ref 30–36.5)
MCHC RBC AUTO-ENTMCNC: 29.8 G/DL (ref 30–36.5)
MCV RBC AUTO: 74.2 FL (ref 80–99)
MCV RBC AUTO: 75.3 FL (ref 80–99)
MONOCYTES # BLD: 1.3 K/UL (ref 0–1)
MONOCYTES NFR BLD: 7 % (ref 5–13)
NEUTS SEG # BLD: 16.5 K/UL (ref 1.8–8)
NEUTS SEG NFR BLD: 84 % (ref 32–75)
NITRITE UR QL STRIP.AUTO: NEGATIVE
NRBC # BLD: 0 K/UL (ref 0–0.01)
NRBC # BLD: 0.03 K/UL (ref 0–0.01)
NRBC BLD-RTO: 0 PER 100 WBC
NRBC BLD-RTO: 0.2 PER 100 WBC
PH UR STRIP: 6 (ref 5–8)
PLATELET # BLD AUTO: 193 K/UL (ref 150–400)
PLATELET # BLD AUTO: 287 K/UL (ref 150–400)
PMV BLD AUTO: 10.6 FL (ref 8.9–12.9)
PMV BLD AUTO: 9.8 FL (ref 8.9–12.9)
POTASSIUM SERPL-SCNC: 3.8 MMOL/L (ref 3.5–5.1)
PROCALCITONIN SERPL-MCNC: <0.05 NG/ML
PROT SERPL-MCNC: 8.7 G/DL (ref 6.4–8.2)
PROT UR STRIP-MCNC: ABNORMAL MG/DL
PROTHROMBIN TIME: 11.3 SEC (ref 9–11.1)
RBC # BLD AUTO: 3.64 M/UL (ref 3.8–5.2)
RBC # BLD AUTO: 4.05 M/UL (ref 3.8–5.2)
RBC #/AREA URNS HPF: ABNORMAL /HPF (ref 0–5)
SERVICE CMNT-IMP: NORMAL
SODIUM SERPL-SCNC: 132 MMOL/L (ref 136–145)
SP GR UR REFRACTOMETRY: 1.01
SPECIMEN HOLD: NORMAL
THERAPEUTIC RANGE: NORMAL SECS (ref 58–77)
TROPONIN I SERPL HS-MCNC: 166 NG/L (ref 0–51)
TROPONIN I SERPL HS-MCNC: 239 NG/L (ref 0–51)
UFH PPP CHRO-ACNC: <0.1 IU/ML
URINE CULTURE IF INDICATED: ABNORMAL
UROBILINOGEN UR QL STRIP.AUTO: 0.2 EU/DL (ref 0.2–1)
WBC # BLD AUTO: 16 K/UL (ref 3.6–11)
WBC # BLD AUTO: 19.8 K/UL (ref 3.6–11)
WBC URNS QL MICRO: ABNORMAL /HPF (ref 0–4)

## 2024-06-13 PROCEDURE — 83605 ASSAY OF LACTIC ACID: CPT

## 2024-06-13 PROCEDURE — 6360000002 HC RX W HCPCS: Performed by: EMERGENCY MEDICINE

## 2024-06-13 PROCEDURE — 85025 COMPLETE CBC W/AUTO DIFF WBC: CPT

## 2024-06-13 PROCEDURE — 96361 HYDRATE IV INFUSION ADD-ON: CPT

## 2024-06-13 PROCEDURE — 99285 EMERGENCY DEPT VISIT HI MDM: CPT

## 2024-06-13 PROCEDURE — 85027 COMPLETE CBC AUTOMATED: CPT

## 2024-06-13 PROCEDURE — 84145 PROCALCITONIN (PCT): CPT

## 2024-06-13 PROCEDURE — 87636 SARSCOV2 & INF A&B AMP PRB: CPT

## 2024-06-13 PROCEDURE — 6370000000 HC RX 637 (ALT 250 FOR IP): Performed by: NURSE PRACTITIONER

## 2024-06-13 PROCEDURE — 84484 ASSAY OF TROPONIN QUANT: CPT

## 2024-06-13 PROCEDURE — 85730 THROMBOPLASTIN TIME PARTIAL: CPT

## 2024-06-13 PROCEDURE — 82962 GLUCOSE BLOOD TEST: CPT

## 2024-06-13 PROCEDURE — 2060000000 HC ICU INTERMEDIATE R&B

## 2024-06-13 PROCEDURE — 96365 THER/PROPH/DIAG IV INF INIT: CPT

## 2024-06-13 PROCEDURE — 2580000003 HC RX 258: Performed by: EMERGENCY MEDICINE

## 2024-06-13 PROCEDURE — 36415 COLL VENOUS BLD VENIPUNCTURE: CPT

## 2024-06-13 PROCEDURE — 81025 URINE PREGNANCY TEST: CPT

## 2024-06-13 PROCEDURE — 85610 PROTHROMBIN TIME: CPT

## 2024-06-13 PROCEDURE — 2580000003 HC RX 258: Performed by: NURSE PRACTITIONER

## 2024-06-13 PROCEDURE — 71046 X-RAY EXAM CHEST 2 VIEWS: CPT

## 2024-06-13 PROCEDURE — 83735 ASSAY OF MAGNESIUM: CPT

## 2024-06-13 PROCEDURE — 81001 URINALYSIS AUTO W/SCOPE: CPT

## 2024-06-13 PROCEDURE — 85520 HEPARIN ASSAY: CPT

## 2024-06-13 PROCEDURE — 80053 COMPREHEN METABOLIC PANEL: CPT

## 2024-06-13 PROCEDURE — 96367 TX/PROPH/DG ADDL SEQ IV INF: CPT

## 2024-06-13 PROCEDURE — 87040 BLOOD CULTURE FOR BACTERIA: CPT

## 2024-06-13 PROCEDURE — 5A09557 ASSISTANCE WITH RESPIRATORY VENTILATION, GREATER THAN 96 CONSECUTIVE HOURS, CONTINUOUS POSITIVE AIRWAY PRESSURE: ICD-10-PCS | Performed by: INTERNAL MEDICINE

## 2024-06-13 PROCEDURE — 6360000002 HC RX W HCPCS: Performed by: NURSE PRACTITIONER

## 2024-06-13 RX ORDER — HEPARIN SODIUM 10000 [USP'U]/100ML
18-30 INJECTION, SOLUTION INTRAVENOUS CONTINUOUS
Status: DISCONTINUED | OUTPATIENT
Start: 2024-06-13 | End: 2024-06-19

## 2024-06-13 RX ORDER — SODIUM CHLORIDE 9 MG/ML
INJECTION, SOLUTION INTRAVENOUS PRN
Status: DISCONTINUED | OUTPATIENT
Start: 2024-06-13 | End: 2024-06-21 | Stop reason: HOSPADM

## 2024-06-13 RX ORDER — DEXTROSE MONOHYDRATE 100 MG/ML
INJECTION, SOLUTION INTRAVENOUS CONTINUOUS PRN
Status: DISCONTINUED | OUTPATIENT
Start: 2024-06-13 | End: 2024-06-21 | Stop reason: HOSPADM

## 2024-06-13 RX ORDER — ONDANSETRON 2 MG/ML
4 INJECTION INTRAMUSCULAR; INTRAVENOUS EVERY 6 HOURS PRN
Status: DISCONTINUED | OUTPATIENT
Start: 2024-06-13 | End: 2024-06-21 | Stop reason: HOSPADM

## 2024-06-13 RX ORDER — SODIUM CHLORIDE 0.9 % (FLUSH) 0.9 %
5-40 SYRINGE (ML) INJECTION PRN
Status: DISCONTINUED | OUTPATIENT
Start: 2024-06-13 | End: 2024-06-21 | Stop reason: HOSPADM

## 2024-06-13 RX ORDER — LANOLIN ALCOHOL/MO/W.PET/CERES
3 CREAM (GRAM) TOPICAL NIGHTLY PRN
Status: DISCONTINUED | OUTPATIENT
Start: 2024-06-13 | End: 2024-06-21 | Stop reason: HOSPADM

## 2024-06-13 RX ORDER — INSULIN LISPRO 100 [IU]/ML
0-4 INJECTION, SOLUTION INTRAVENOUS; SUBCUTANEOUS NIGHTLY
Status: DISCONTINUED | OUTPATIENT
Start: 2024-06-13 | End: 2024-06-14

## 2024-06-13 RX ORDER — HEPARIN SODIUM 1000 [USP'U]/ML
40 INJECTION, SOLUTION INTRAVENOUS; SUBCUTANEOUS PRN
Status: DISCONTINUED | OUTPATIENT
Start: 2024-06-13 | End: 2024-06-19

## 2024-06-13 RX ORDER — GLUCAGON 1 MG/ML
1 KIT INJECTION PRN
Status: DISCONTINUED | OUTPATIENT
Start: 2024-06-13 | End: 2024-06-21 | Stop reason: HOSPADM

## 2024-06-13 RX ORDER — MORPHINE SULFATE 4 MG/ML
4 INJECTION, SOLUTION INTRAMUSCULAR; INTRAVENOUS EVERY 4 HOURS PRN
Status: DISCONTINUED | OUTPATIENT
Start: 2024-06-13 | End: 2024-06-21 | Stop reason: HOSPADM

## 2024-06-13 RX ORDER — MORPHINE SULFATE 4 MG/ML
4 INJECTION, SOLUTION INTRAMUSCULAR; INTRAVENOUS ONCE
Status: COMPLETED | OUTPATIENT
Start: 2024-06-13 | End: 2024-06-13

## 2024-06-13 RX ORDER — HEPARIN SODIUM 1000 [USP'U]/ML
80 INJECTION, SOLUTION INTRAVENOUS; SUBCUTANEOUS ONCE
Status: COMPLETED | OUTPATIENT
Start: 2024-06-13 | End: 2024-06-13

## 2024-06-13 RX ORDER — ALBUTEROL SULFATE 2.5 MG/3ML
2.5 SOLUTION RESPIRATORY (INHALATION) EVERY 4 HOURS PRN
Status: DISCONTINUED | OUTPATIENT
Start: 2024-06-13 | End: 2024-06-21 | Stop reason: HOSPADM

## 2024-06-13 RX ORDER — GUAIFENESIN 600 MG/1
600 TABLET, EXTENDED RELEASE ORAL 2 TIMES DAILY
Status: DISCONTINUED | OUTPATIENT
Start: 2024-06-13 | End: 2024-06-21 | Stop reason: HOSPADM

## 2024-06-13 RX ORDER — ONDANSETRON 4 MG/1
4 TABLET, ORALLY DISINTEGRATING ORAL EVERY 8 HOURS PRN
Status: DISCONTINUED | OUTPATIENT
Start: 2024-06-13 | End: 2024-06-21 | Stop reason: HOSPADM

## 2024-06-13 RX ORDER — ACETAMINOPHEN 325 MG/1
650 TABLET ORAL EVERY 6 HOURS PRN
Status: DISCONTINUED | OUTPATIENT
Start: 2024-06-13 | End: 2024-06-21 | Stop reason: HOSPADM

## 2024-06-13 RX ORDER — MORPHINE SULFATE 2 MG/ML
2 INJECTION, SOLUTION INTRAMUSCULAR; INTRAVENOUS EVERY 4 HOURS PRN
Status: DISCONTINUED | OUTPATIENT
Start: 2024-06-13 | End: 2024-06-21 | Stop reason: HOSPADM

## 2024-06-13 RX ORDER — INSULIN LISPRO 100 [IU]/ML
0-8 INJECTION, SOLUTION INTRAVENOUS; SUBCUTANEOUS
Status: DISCONTINUED | OUTPATIENT
Start: 2024-06-14 | End: 2024-06-14

## 2024-06-13 RX ORDER — 0.9 % SODIUM CHLORIDE 0.9 %
30 INTRAVENOUS SOLUTION INTRAVENOUS ONCE
Status: COMPLETED | OUTPATIENT
Start: 2024-06-13 | End: 2024-06-13

## 2024-06-13 RX ORDER — ACETAMINOPHEN 650 MG/1
650 SUPPOSITORY RECTAL EVERY 6 HOURS PRN
Status: DISCONTINUED | OUTPATIENT
Start: 2024-06-13 | End: 2024-06-21 | Stop reason: HOSPADM

## 2024-06-13 RX ORDER — SODIUM CHLORIDE 9 MG/ML
INJECTION, SOLUTION INTRAVENOUS CONTINUOUS
Status: DISCONTINUED | OUTPATIENT
Start: 2024-06-13 | End: 2024-06-14

## 2024-06-13 RX ORDER — OXYCODONE HYDROCHLORIDE 5 MG/1
5 TABLET ORAL EVERY 4 HOURS PRN
Status: DISCONTINUED | OUTPATIENT
Start: 2024-06-13 | End: 2024-06-21 | Stop reason: HOSPADM

## 2024-06-13 RX ORDER — BENZONATATE 100 MG/1
100 CAPSULE ORAL 3 TIMES DAILY PRN
Status: DISCONTINUED | OUTPATIENT
Start: 2024-06-13 | End: 2024-06-21 | Stop reason: HOSPADM

## 2024-06-13 RX ORDER — OXYCODONE HYDROCHLORIDE 5 MG/1
10 TABLET ORAL EVERY 4 HOURS PRN
Status: DISCONTINUED | OUTPATIENT
Start: 2024-06-13 | End: 2024-06-21 | Stop reason: HOSPADM

## 2024-06-13 RX ORDER — IPRATROPIUM BROMIDE AND ALBUTEROL SULFATE 2.5; .5 MG/3ML; MG/3ML
1 SOLUTION RESPIRATORY (INHALATION) 2 TIMES DAILY
Status: DISCONTINUED | OUTPATIENT
Start: 2024-06-13 | End: 2024-06-21 | Stop reason: HOSPADM

## 2024-06-13 RX ORDER — SODIUM CHLORIDE 0.9 % (FLUSH) 0.9 %
5-40 SYRINGE (ML) INJECTION EVERY 12 HOURS SCHEDULED
Status: DISCONTINUED | OUTPATIENT
Start: 2024-06-13 | End: 2024-06-21 | Stop reason: HOSPADM

## 2024-06-13 RX ORDER — HEPARIN SODIUM 1000 [USP'U]/ML
80 INJECTION, SOLUTION INTRAVENOUS; SUBCUTANEOUS PRN
Status: DISCONTINUED | OUTPATIENT
Start: 2024-06-13 | End: 2024-06-19

## 2024-06-13 RX ORDER — POLYETHYLENE GLYCOL 3350 17 G/17G
17 POWDER, FOR SOLUTION ORAL DAILY PRN
Status: DISCONTINUED | OUTPATIENT
Start: 2024-06-13 | End: 2024-06-21 | Stop reason: HOSPADM

## 2024-06-13 RX ADMIN — AZITHROMYCIN MONOHYDRATE 500 MG: 500 INJECTION, POWDER, LYOPHILIZED, FOR SOLUTION INTRAVENOUS at 20:05

## 2024-06-13 RX ADMIN — SODIUM CHLORIDE: 9 INJECTION, SOLUTION INTRAVENOUS at 22:53

## 2024-06-13 RX ADMIN — SODIUM CHLORIDE 3411 ML: 9 INJECTION, SOLUTION INTRAVENOUS at 20:06

## 2024-06-13 RX ADMIN — HEPARIN SODIUM 9100 UNITS: 1000 INJECTION INTRAVENOUS; SUBCUTANEOUS at 22:49

## 2024-06-13 RX ADMIN — HEPARIN SODIUM AND DEXTROSE 18 UNITS/KG/HR: 10000; 5 INJECTION INTRAVENOUS at 22:53

## 2024-06-13 RX ADMIN — SODIUM CHLORIDE 1000 MG: 900 INJECTION INTRAVENOUS at 20:03

## 2024-06-13 RX ADMIN — GUAIFENESIN 600 MG: 600 TABLET, EXTENDED RELEASE ORAL at 22:45

## 2024-06-13 RX ADMIN — MORPHINE SULFATE 4 MG: 4 INJECTION INTRAVENOUS at 22:45

## 2024-06-13 RX ADMIN — SODIUM CHLORIDE, PRESERVATIVE FREE 5 ML: 5 INJECTION INTRAVENOUS at 22:46

## 2024-06-13 ASSESSMENT — PAIN SCALES - GENERAL
PAINLEVEL_OUTOF10: 8
PAINLEVEL_OUTOF10: 10

## 2024-06-13 ASSESSMENT — PAIN DESCRIPTION - DESCRIPTORS
DESCRIPTORS: ACHING
DESCRIPTORS: ACHING

## 2024-06-13 ASSESSMENT — LIFESTYLE VARIABLES
HOW OFTEN DO YOU HAVE A DRINK CONTAINING ALCOHOL: MONTHLY OR LESS
HOW MANY STANDARD DRINKS CONTAINING ALCOHOL DO YOU HAVE ON A TYPICAL DAY: 1 OR 2

## 2024-06-13 ASSESSMENT — PAIN DESCRIPTION - LOCATION
LOCATION: CHEST
LOCATION: CHEST;RIB CAGE

## 2024-06-13 ASSESSMENT — PAIN DESCRIPTION - ORIENTATION
ORIENTATION: RIGHT;MID
ORIENTATION: MID;RIGHT

## 2024-06-13 ASSESSMENT — PAIN - FUNCTIONAL ASSESSMENT: PAIN_FUNCTIONAL_ASSESSMENT: 0-10

## 2024-06-13 ASSESSMENT — ENCOUNTER SYMPTOMS: ABDOMINAL PAIN: 1

## 2024-06-13 NOTE — ED PROVIDER NOTES
[facility] EMERGENCY DEPT  EMERGENCY DEPARTMENT HISTORY AND PHYSICAL EXAM      Date: 6/13/2024  Patient Name: Diana Snell  MRN: [id number]  Birthdate 1990  Date of evaluation: 6/13/2024  Provider: Daryl Lorenz MD   Note Started: 5:56 PM EDT 6/13/24    HISTORY OF PRESENT ILLNESS     Chief Complaint   Patient presents with    Shortness of Breath     Patient wheeled into triage complaining of shortness of breath that is getting worse over the last couple days. Patient reports going to [facility] yesterday and they told her she had blood clots in her lungs. Patient reports they gave her some prescriptions and sent her home but her SOB is worse today and she is having chest pain now with trouble moving around. Hx PE.    Chest Pain       History Provided By: Patient    HPI: Diana Snell is a 33 y.o. female who presents to the emergency room today complaining of shortness of breath getting worse over the past couple of days.  She was seen and evaluated yesterday at [facility] received a CTA of the chest and told that she had bilateral upper lobe blood clots as well as possible pneumonia and was started on double coverage for pneumonia.  She conveys that she has been on Eliquis but has not been compliant and stops in for her periods each month.  Patient is having some mild chest tightness at times with activity in triage she sats 86% on room air    PAST MEDICAL HISTORY   Past Medical History:  Past Medical History:   Diagnosis Date    Cancer (HCC)     ovarian     Hypertension     with pregnancy    Ill-defined condition     JAIME 1 with last pap    Ill-defined condition     MS       Past Surgical History:  History reviewed. No pertinent surgical history.    Family History:  Family History   Problem Relation Age of Onset    Kidney Disease Mother     Cancer Father        Social History:  Social History     Tobacco Use    Smoking status: Some Days     Current packs/day: 0.25     Types: Cigarettes    Smokeless tobacco:  tablet by mouth daily (with breakfast)     PARoxetine 20 MG tablet  Commonly known as: PAXIL     predniSONE 10 MG (21) Tbpk  Follow instructions in dose pack.     rivaroxaban 20 MG Tabs tablet  Commonly known as: XARELTO  Take 1 tablet by mouth Daily with supper                DISCONTINUED MEDICATIONS:  Current Discharge Medication List          I am the Primary Clinician of Record. Daryl Lorenz MD (electronically signed)    (Please note that parts of this dictation were completed with voice recognition software. Quite often unanticipated grammatical, syntax, homophones, and other interpretive errors are inadvertently transcribed by the computer software. Please disregards these errors. Please excuse any errors that have escaped final proofreading.)     Daryl Lorenz MD  06/13/24 [id number]

## 2024-06-13 NOTE — DISCHARGE INSTRUCTIONS
Immediately resume taking your Xarelto and take the antibiotics as prescribed.  Follow-up with your primary doctor and 1 week and return if you become significantly short of breath or your oxygen becomes low.

## 2024-06-14 ENCOUNTER — APPOINTMENT (OUTPATIENT)
Facility: HOSPITAL | Age: 34
End: 2024-06-14
Payer: MEDICAID

## 2024-06-14 PROBLEM — I82.431 ACUTE DEEP VEIN THROMBOSIS (DVT) OF POPLITEAL VEIN OF RIGHT LOWER EXTREMITY (HCC): Status: ACTIVE | Noted: 2024-06-14

## 2024-06-14 PROBLEM — I82.409 RECURRENT DEEP VENOUS THROMBOSIS (HCC): Status: ACTIVE | Noted: 2019-09-17

## 2024-06-14 LAB
ANION GAP SERPL CALC-SCNC: 12 MMOL/L (ref 5–15)
B PERT DNA SPEC QL NAA+PROBE: NOT DETECTED
BASOPHILS # BLD: 0 K/UL (ref 0–0.1)
BASOPHILS NFR BLD: 0 % (ref 0–1)
BORDETELLA PARAPERTUSSIS BY PCR: NOT DETECTED
BUN SERPL-MCNC: 4 MG/DL (ref 6–20)
BUN/CREAT SERPL: 8 (ref 12–20)
C PNEUM DNA SPEC QL NAA+PROBE: NOT DETECTED
CALCIUM SERPL-MCNC: 8.3 MG/DL (ref 8.5–10.1)
CHLORIDE SERPL-SCNC: 106 MMOL/L (ref 97–108)
CHOLEST SERPL-MCNC: 144 MG/DL
CO2 SERPL-SCNC: 22 MMOL/L (ref 21–32)
CREAT SERPL-MCNC: 0.49 MG/DL (ref 0.55–1.02)
DIFFERENTIAL METHOD BLD: ABNORMAL
ECHO BSA: 2.32 M2
EOSINOPHIL # BLD: 0 K/UL (ref 0–0.4)
EOSINOPHIL NFR BLD: 0 % (ref 0–7)
ERYTHROCYTE [DISTWIDTH] IN BLOOD BY AUTOMATED COUNT: 18.8 % (ref 11.5–14.5)
EST. AVERAGE GLUCOSE BLD GHB EST-MCNC: 111 MG/DL
FERRITIN SERPL-MCNC: 14 NG/ML (ref 26–388)
FLUAV RNA SPEC QL NAA+PROBE: NOT DETECTED
FLUAV SUBTYP SPEC NAA+PROBE: NOT DETECTED
FLUBV RNA SPEC QL NAA+PROBE: NOT DETECTED
FLUBV RNA SPEC QL NAA+PROBE: NOT DETECTED
FOLATE SERPL-MCNC: 9.8 NG/ML (ref 5–21)
GLUCOSE BLD STRIP.AUTO-MCNC: 106 MG/DL (ref 65–117)
GLUCOSE SERPL-MCNC: 96 MG/DL (ref 65–100)
HADV DNA SPEC QL NAA+PROBE: NOT DETECTED
HBA1C MFR BLD: 5.5 % (ref 4–5.6)
HCG UR QL: NEGATIVE
HCOV 229E RNA SPEC QL NAA+PROBE: NOT DETECTED
HCOV HKU1 RNA SPEC QL NAA+PROBE: NOT DETECTED
HCOV NL63 RNA SPEC QL NAA+PROBE: NOT DETECTED
HCOV OC43 RNA SPEC QL NAA+PROBE: NOT DETECTED
HCT VFR BLD AUTO: 27.3 % (ref 35–47)
HDLC SERPL-MCNC: 54 MG/DL
HDLC SERPL: 2.7 (ref 0–5)
HGB BLD-MCNC: 7.9 G/DL (ref 11.5–16)
HMPV RNA SPEC QL NAA+PROBE: NOT DETECTED
HPIV1 RNA SPEC QL NAA+PROBE: NOT DETECTED
HPIV2 RNA SPEC QL NAA+PROBE: NOT DETECTED
HPIV3 RNA SPEC QL NAA+PROBE: NOT DETECTED
HPIV4 RNA SPEC QL NAA+PROBE: NOT DETECTED
IMM GRANULOCYTES # BLD AUTO: 0.2 K/UL (ref 0–0.04)
IMM GRANULOCYTES NFR BLD AUTO: 1 % (ref 0–0.5)
INR PPP: 1.1 (ref 0.9–1.1)
IRON SATN MFR SERPL: 8 % (ref 20–50)
IRON SERPL-MCNC: 25 UG/DL (ref 35–150)
LACTATE SERPL-SCNC: 0.5 MMOL/L (ref 0.4–2)
LDLC SERPL CALC-MCNC: 71.4 MG/DL (ref 0–100)
LYMPHOCYTES # BLD: 3.1 K/UL (ref 0.8–3.5)
LYMPHOCYTES NFR BLD: 20 % (ref 12–49)
M PNEUMO DNA SPEC QL NAA+PROBE: NOT DETECTED
MAGNESIUM SERPL-MCNC: 1.8 MG/DL (ref 1.6–2.4)
MCH RBC QN AUTO: 21.9 PG (ref 26–34)
MCHC RBC AUTO-ENTMCNC: 28.9 G/DL (ref 30–36.5)
MCV RBC AUTO: 75.8 FL (ref 80–99)
MONOCYTES # BLD: 1.6 K/UL (ref 0–1)
MONOCYTES NFR BLD: 10 % (ref 5–13)
NEUTS SEG # BLD: 10.6 K/UL (ref 1.8–8)
NEUTS SEG NFR BLD: 69 % (ref 32–75)
NRBC # BLD: 0 K/UL (ref 0–0.01)
NRBC BLD-RTO: 0 PER 100 WBC
PLATELET # BLD AUTO: 217 K/UL (ref 150–400)
PMV BLD AUTO: 10.6 FL (ref 8.9–12.9)
POTASSIUM SERPL-SCNC: 3.4 MMOL/L (ref 3.5–5.1)
PROTHROMBIN TIME: 11.4 SEC (ref 9–11.1)
RBC # BLD AUTO: 3.6 M/UL (ref 3.8–5.2)
RBC MORPH BLD: ABNORMAL
RSV RNA SPEC QL NAA+PROBE: NOT DETECTED
RV+EV RNA SPEC QL NAA+PROBE: NOT DETECTED
SARS-COV-2 RNA RESP QL NAA+PROBE: NOT DETECTED
SARS-COV-2 RNA RESP QL NAA+PROBE: NOT DETECTED
SERVICE CMNT-IMP: NORMAL
SODIUM SERPL-SCNC: 140 MMOL/L (ref 136–145)
TIBC SERPL-MCNC: 325 UG/DL (ref 250–450)
TRIGL SERPL-MCNC: 93 MG/DL
TROPONIN I SERPL HS-MCNC: 114 NG/L (ref 0–51)
TROPONIN I SERPL HS-MCNC: 228 NG/L (ref 0–51)
UFH PPP CHRO-ACNC: 0.56 IU/ML
UFH PPP CHRO-ACNC: 0.57 IU/ML
UFH PPP CHRO-ACNC: 0.95 IU/ML
VIT B12 SERPL-MCNC: 342 PG/ML (ref 193–986)
VLDLC SERPL CALC-MCNC: 18.6 MG/DL
WBC # BLD AUTO: 15.5 K/UL (ref 3.6–11)

## 2024-06-14 PROCEDURE — 2060000000 HC ICU INTERMEDIATE R&B

## 2024-06-14 PROCEDURE — 0202U NFCT DS 22 TRGT SARS-COV-2: CPT

## 2024-06-14 PROCEDURE — 97166 OT EVAL MOD COMPLEX 45 MIN: CPT

## 2024-06-14 PROCEDURE — 82607 VITAMIN B-12: CPT

## 2024-06-14 PROCEDURE — 2580000003 HC RX 258: Performed by: NURSE PRACTITIONER

## 2024-06-14 PROCEDURE — 80048 BASIC METABOLIC PNL TOTAL CA: CPT

## 2024-06-14 PROCEDURE — 93970 EXTREMITY STUDY: CPT

## 2024-06-14 PROCEDURE — 94640 AIRWAY INHALATION TREATMENT: CPT

## 2024-06-14 PROCEDURE — 99223 1ST HOSP IP/OBS HIGH 75: CPT | Performed by: INTERNAL MEDICINE

## 2024-06-14 PROCEDURE — 85520 HEPARIN ASSAY: CPT

## 2024-06-14 PROCEDURE — 6370000000 HC RX 637 (ALT 250 FOR IP): Performed by: NURSE PRACTITIONER

## 2024-06-14 PROCEDURE — 80061 LIPID PANEL: CPT

## 2024-06-14 PROCEDURE — 85025 COMPLETE CBC W/AUTO DIFF WBC: CPT

## 2024-06-14 PROCEDURE — 6360000002 HC RX W HCPCS: Performed by: NURSE PRACTITIONER

## 2024-06-14 PROCEDURE — 94660 CPAP INITIATION&MGMT: CPT

## 2024-06-14 PROCEDURE — 6370000000 HC RX 637 (ALT 250 FOR IP): Performed by: STUDENT IN AN ORGANIZED HEALTH CARE EDUCATION/TRAINING PROGRAM

## 2024-06-14 PROCEDURE — 82962 GLUCOSE BLOOD TEST: CPT

## 2024-06-14 PROCEDURE — 86738 MYCOPLASMA ANTIBODY: CPT

## 2024-06-14 PROCEDURE — 83550 IRON BINDING TEST: CPT

## 2024-06-14 PROCEDURE — 83735 ASSAY OF MAGNESIUM: CPT

## 2024-06-14 PROCEDURE — 82728 ASSAY OF FERRITIN: CPT

## 2024-06-14 PROCEDURE — 85610 PROTHROMBIN TIME: CPT

## 2024-06-14 PROCEDURE — 2700000000 HC OXYGEN THERAPY PER DAY

## 2024-06-14 PROCEDURE — 87449 NOS EACH ORGANISM AG IA: CPT

## 2024-06-14 PROCEDURE — 97162 PT EVAL MOD COMPLEX 30 MIN: CPT

## 2024-06-14 PROCEDURE — 84484 ASSAY OF TROPONIN QUANT: CPT

## 2024-06-14 PROCEDURE — 83036 HEMOGLOBIN GLYCOSYLATED A1C: CPT

## 2024-06-14 PROCEDURE — 83605 ASSAY OF LACTIC ACID: CPT

## 2024-06-14 PROCEDURE — 82746 ASSAY OF FOLIC ACID SERUM: CPT

## 2024-06-14 PROCEDURE — 36415 COLL VENOUS BLD VENIPUNCTURE: CPT

## 2024-06-14 PROCEDURE — 97530 THERAPEUTIC ACTIVITIES: CPT

## 2024-06-14 PROCEDURE — 97535 SELF CARE MNGMENT TRAINING: CPT

## 2024-06-14 PROCEDURE — 83540 ASSAY OF IRON: CPT

## 2024-06-14 RX ADMIN — SODIUM CHLORIDE, PRESERVATIVE FREE 10 ML: 5 INJECTION INTRAVENOUS at 20:34

## 2024-06-14 RX ADMIN — MELATONIN 3 MG: at 20:34

## 2024-06-14 RX ADMIN — SODIUM CHLORIDE 1000 MG: 900 INJECTION INTRAVENOUS at 19:53

## 2024-06-14 RX ADMIN — GUAIFENESIN 600 MG: 600 TABLET, EXTENDED RELEASE ORAL at 09:10

## 2024-06-14 RX ADMIN — HEPARIN SODIUM AND DEXTROSE 16.01 UNITS/KG/HR: 10000; 5 INJECTION INTRAVENOUS at 12:56

## 2024-06-14 RX ADMIN — POTASSIUM BICARBONATE 20 MEQ: 782 TABLET, EFFERVESCENT ORAL at 14:36

## 2024-06-14 RX ADMIN — IRON SUCROSE 300 MG: 20 INJECTION, SOLUTION INTRAVENOUS at 17:02

## 2024-06-14 RX ADMIN — AZITHROMYCIN MONOHYDRATE 500 MG: 500 INJECTION, POWDER, LYOPHILIZED, FOR SOLUTION INTRAVENOUS at 20:33

## 2024-06-14 RX ADMIN — SODIUM CHLORIDE, PRESERVATIVE FREE 10 ML: 5 INJECTION INTRAVENOUS at 09:10

## 2024-06-14 RX ADMIN — MORPHINE SULFATE 4 MG: 4 INJECTION INTRAVENOUS at 21:42

## 2024-06-14 RX ADMIN — SODIUM CHLORIDE: 9 INJECTION, SOLUTION INTRAVENOUS at 06:29

## 2024-06-14 RX ADMIN — MORPHINE SULFATE 2 MG: 2 INJECTION, SOLUTION INTRAMUSCULAR; INTRAVENOUS at 06:22

## 2024-06-14 RX ADMIN — GUAIFENESIN 600 MG: 600 TABLET, EXTENDED RELEASE ORAL at 20:34

## 2024-06-14 RX ADMIN — IPRATROPIUM BROMIDE AND ALBUTEROL SULFATE 1 DOSE: .5; 3 SOLUTION RESPIRATORY (INHALATION) at 07:20

## 2024-06-14 RX ADMIN — IPRATROPIUM BROMIDE AND ALBUTEROL SULFATE 1 DOSE: .5; 3 SOLUTION RESPIRATORY (INHALATION) at 22:27

## 2024-06-14 ASSESSMENT — PAIN DESCRIPTION - ORIENTATION
ORIENTATION: RIGHT
ORIENTATION: RIGHT

## 2024-06-14 ASSESSMENT — PAIN SCALES - GENERAL
PAINLEVEL_OUTOF10: 0
PAINLEVEL_OUTOF10: 6
PAINLEVEL_OUTOF10: 3
PAINLEVEL_OUTOF10: 8
PAINLEVEL_OUTOF10: 0
PAINLEVEL_OUTOF10: 3
PAINLEVEL_OUTOF10: 0
PAINLEVEL_OUTOF10: 0
PAINLEVEL_OUTOF10: 4

## 2024-06-14 ASSESSMENT — PAIN DESCRIPTION - DESCRIPTORS
DESCRIPTORS: ACHING
DESCRIPTORS: ACHING

## 2024-06-14 ASSESSMENT — PAIN - FUNCTIONAL ASSESSMENT
PAIN_FUNCTIONAL_ASSESSMENT: ACTIVITIES ARE NOT PREVENTED
PAIN_FUNCTIONAL_ASSESSMENT: ACTIVITIES ARE NOT PREVENTED

## 2024-06-14 ASSESSMENT — PAIN DESCRIPTION - FREQUENCY: FREQUENCY: INTERMITTENT

## 2024-06-14 ASSESSMENT — PAIN DESCRIPTION - LOCATION
LOCATION: RIB CAGE
LOCATION: RIB CAGE;CHEST
LOCATION: RIB CAGE

## 2024-06-14 ASSESSMENT — PAIN DESCRIPTION - PAIN TYPE: TYPE: CHRONIC PAIN

## 2024-06-14 NOTE — PROGRESS NOTES
End of Shift Note    Bedside shift change report given to Ludivina RN and DONALD Sharma (oncoming nurse) by Manuel Camacho RN (offgoing nurse).  Report included the following information SBAR    Shift worked:  7a-7p     Shift summary and any significant changes:     Several tests run this shift. Low grade fever this evening with some chills. Respiratory panel pending. IV iron infusion administered.     Concerns for physician to address:  none     Zone phone for oncoming shift:   [phone]       Activity:     Number times ambulated in hallways past shift: 0  Number of times OOB to chair past shift: 0    Cardiac:   Cardiac Monitoring: Yes           Access:  Current line(s): PIV     Genitourinary:   Urinary status: voiding and external catheter    Respiratory:      Chronic home O2 use?: NO  Incentive spirometer at bedside: NO       GI:     Current diet:  ADULT DIET; Regular  Passing flatus: YES  Tolerating current diet: YES       Pain Management:   Patient states pain is manageable on current regimen: YES    Skin:     Interventions: float heels and increase time out of bed    Patient Safety:  Fall Score:    Interventions: bed/chair alarm, gripper socks, pt to call before getting OOB, and stay with me (per policy)       Length of Stay:  Expected LOS: 3  Actual LOS: 1      Manuel Camacho, RN

## 2024-06-14 NOTE — PROGRESS NOTES
Hospitalist Progress Note    NAME:   Diana Snell   : 1990   MRN: [id number]     Date/Time: 2024 2:27 PM  Patient PCP: Sonya Abreu MD    Estimated discharge date:   Barriers: weaning oxygen    Assessment / Plan:  ARF likely dt PE     ECHO pending  cxr suspected new rt airspace disease/pna vs pulmonary infarct  cta chest + subacute to chronic pulmonary emboli in upper and lower lobes  Ct a/p neg acute infectious processes or findings, has IUD in uterus as expected w/o complications  Covid and flu negative  Pulmonary and hematology consulted  Continue heparin drip  Was not taking xarelto d/t cost issue, will likely need coumadin     Elevated troponin:  Likely d/t PE  Cardiology was consulted by admitting provider     Mild acute hyponatremia, asymptomatic  -ivf as above,   -trend sodium level     Mild acute hyperglycemia w/o known h/o T2DM  Check hba1c, no need for sliding scale    Chronic anemia, hgb stable, w/o acute active bleeding, states known menorrhagia  -states holding the bld thinner for at least 3-4 days when having her cycle  -last time took xarelto was -, states ran out of medicaid, only covered for birth control  -monitor hgb, consider transfuse vs iron iv if hgb drops  -anemia profile in am   -consider follow-up outpt with gynecology/hematology      Medical Decision Making:   I personally reviewed labs: CBC, BMP  I personally reviewed imaging:  I personally reviewed EKG:  Toxic drug monitoring:   Discussed case with:     Code Status: Full  DVT Prophylaxis: Heparin drip  GI Prophylaxis:    Subjective:     Chief Complaint / Reason for Physician Visit  Seen and evaluated  Having a lot of pain  Looks very weak, sittting on the edge of the bed, working with physical therapy      Objective:     VITALS:   Last 24hrs VS reviewed since prior progress note. Most recent are:  Patient Vitals for the past 24 hrs:   BP Temp Temp src Pulse Resp SpO2 Height Weight   24

## 2024-06-14 NOTE — PLAN OF CARE
Problem: Occupational Therapy - Adult  Goal: By Discharge: Performs self-care activities at highest level of function for planned discharge setting.  See evaluation for individualized goals.  Description: FUNCTIONAL STATUS PRIOR TO ADMISSION:  pt w/ hx of chronic PE since 2018 and within last ~2 weeks has stated increased general weakness and RLQ stomach pain; pt states independence with ADLs and ambulation, but her partner has been assisting more with meal prep and IADL completion =  Receives Help From: Family, ADL Assistance: Independent,  ,  ,  ,  ,  ,  , Ambulation Assistance: Independent, Transfer Assistance: Independent, Active : Yes     HOME SUPPORT: Patient lived w/ significant other.    Occupational Therapy Goals:  Initiated 6/14/2024  1.  Patient will perform grooming while standing at sink with Contact Guard Assist within 7 day(s).  2.  Patient will perform upper body dressing with Supervision within 7 day(s).  3.  Patient will perform lower body dressing with Minimal Assist within 7 day(s).  4.  Patient will perform toilet transfers with Contact Guard Assist  within 7 day(s).  5.  Patient will perform all aspects of toileting with Contact Guard Assist within 7 day(s).  6.  Patient will participate in upper extremity therapeutic exercise/activities with Stand by Assist for 5 minutes within 7 day(s).    7.  Patient will utilize energy conservation techniques during functional activities with verbal cues within 7 day(s).   Outcome: Progressing    OCCUPATIONAL THERAPY EVALUATION    Patient: Diana Snell (33 y.o. female)  Date: 6/14/2024  Primary Diagnosis: Acute and chr resp failure, unsp w hypoxia or hypercapnia (HCC) [J96.20]  Pneumonia due to infectious organism, unspecified laterality, unspecified part of lung [J18.9]  Acute pulmonary embolism without acute cor pulmonale, unspecified pulmonary embolism type (HCC) [I26.99]         Precautions: Fall Risk                  ASSESSMENT :  The  patient is limited by decreased functional mobility, independence in ADLs, high-level IADLs, ROM, strength, activity tolerance, endurance, increased pain levels, general weakness . Pt x-ray displayed right pneumonia or pulmonary infarct, pt with chronic PE since 2018 on xarelto but unable to take medication last ~2 weeks due to insurance issues.     Based on the impairments listed above pt warrants further acute care OT services. Pt cleared to work with therapy services via RN. Pt on 3L NC upon arrival and ranged between 90-92% while supine in bed, BP stable. Due to increased pain in RLQ stomach and breathing pain due to chronic PE , pt required increased time and frequent rest breaks to come seated at EOB (supervision). Once seated at EOB pt O2 on 3L NC dropped down to 86% and was visibly SOB, placed pt on 4L and able to recover up to 94%. Intact static sitting balance noted, but pt unable to perform BUE AROM (able to complete AROM shoulder flexion one at a time) without slight LOB, pt able to recorrect. Due to abdomen pain and difficulty breathing, pt unable to complete LB dressing but completed seated grooming tasks of dental hygiene (mouthwash) and washing face w/ setup. Due to increased pain did not attempt transfers OOB, pt required increased time, rest breaks, and supervision to be posistioned back into bed. Once supine w/ HOB elevated in bed, pt placed back down to 3L and maintained 93-94%. Currently pt completing ADLs ranging from independent to mod A (based on clinical judgement). Pending progress with therapy pt may required additional rehab at time of discharge, will continue to follow pt to update on appropriate discharge recommendations.     Functional Outcome Measure:  The patient scored 60/100 on the Barthel Index outcome measure which is indicative of being minimally independent with ADLs and ambulation.         PLAN :  Recommendations and Planned Interventions:   self care training, therapeutic

## 2024-06-14 NOTE — H&P
Hospitalist Admission Note    NAME:   Diana Snell   : 1990   MRN: [id number]     Date/Time: 2024 9:45 PM    Patient PCP: Sonya Abreu MD    ______________________________________________________________________  Given the patient's current clinical presentation, I have a high level of concern for decompensation if discharged from the emergency department.  Complex decision making was performed, which includes reviewing the patient's available past medical records, laboratory results, and x-ray films.       My assessment of this patient's clinical condition and my plan of care is as follows.    Assessment / Plan:    ARF w/hypoxia 86%, likely multifactorial with suspected CAP on imaging and also newly diagnosed with pulmonary embolus, lactic 1.0 wnl on admit, wbc 19.8, meeting SIRS criteria, not septic shock appearing, afebrile, likely tachycardia and tachypnea attributed to P.E., on 3lpm on admit on RA at home  -admit inpatient  -rocephin, zithromax, wt based bolus given in ER  -ivf continuous for supportive care  -am labs  -pulmonary toileting, ics, turn cough deep breathe  -mucinex bid scheduled, tessalon perles prn  -rianna and prn nebs  -echo done 19, repeat echo ordered- pending  -cxr suspected new rt airspace disease/pna vs pulmonary infarct  -ivf supportive care  -u/a neg acute cystitis on admit  -cta chest + subacute to chronic pulmonary emboli in upper and lower lobes  -ct a/p neg acute infectious processes or findings, has IUD in uterus as expected w/o complications  -morphine 4mg iv x1 now, and then pain mgmt prn ordered morphine and oxy- consider transition off morphine as soon as able as d/w pt  -echo in am r/o heart strain- pending  -covid/flu- pending on admit  -clear liquid diet until seen by cards in am    Acute NSTEMI likely 2/2 type 2 demand ischemia with acute chest pain, suspected 2/2 pulmonary embolus with trop elevated on admit 166, pain worse on inspiration, but

## 2024-06-14 NOTE — PROGRESS NOTES
Bedside shift change report given to DONALD Jackson (oncoming nurse) by DONALD Florence and DONALD Sharma (offgoing nurse). Report included the following information Nurse Handoff Report.

## 2024-06-14 NOTE — PLAN OF CARE
Problem: Pain  Goal: Verbalizes/displays adequate comfort level or baseline comfort level  Outcome: Progressing  Flowsheets (Taken 6/14/2024 0254 by Zachary Christiansen, RN)  Verbalizes/displays adequate comfort level or baseline comfort level:   Assess pain using appropriate pain scale   Administer analgesics based on type and severity of pain and evaluate response     Problem: ABCDS Injury Assessment  Goal: Absence of physical injury  Outcome: Progressing     Problem: Respiratory - Adult  Goal: Achieves optimal ventilation and oxygenation  6/14/2024 1450 by Araceli Camacho RN  Outcome: Progressing  6/14/2024 1141 by Liza Sommers,   Outcome: Progressing  6/14/2024 0120 by Madison Ravi, RT  Outcome: Progressing     Problem: Physical Therapy - Adult  Goal: By Discharge: Performs mobility at highest level of function for planned discharge setting.  See evaluation for individualized goals.  Description: FUNCTIONAL STATUS PRIOR TO ADMISSION: Patient was independent and active without use of DME.    HOME SUPPORT PRIOR TO ADMISSION: The patient lived with significant other and 3 children.    Physical Therapy Goals  Initiated 6/14/2024  1.  Patient will move from supine to sit and sit to supine  and scoot up and down in bed with modified independence within 7 day(s).    2.  Patient will transfer from bed to chair and chair to bed with modified independence using the least restrictive device within 7 day(s).  3.  Patient will perform sit to stand with modified independence within 7 day(s).  4.  Patient will ambulate with modified independence for 100 feet with the least restrictive device within 7 day(s).   5.  Patient will ascend/descend 15 stairs with one handrail(s) with supervision/set-up within 7 day(s).    6/14/2024 0951 by Brigida Snow, PT  Outcome: Progressing     Problem: Occupational Therapy - Adult  Goal: By Discharge: Performs self-care activities at highest level of function for  planned discharge setting.  See evaluation for individualized goals.  Description: FUNCTIONAL STATUS PRIOR TO ADMISSION:  pt w/ hx of chronic PE since 2018 and within last ~2 weeks has stated increased general weakness and RLQ stomach pain; pt states independence with ADLs and ambulation, but her partner has been assisting more with meal prep and IADL completion =  Receives Help From: Family, ADL Assistance: Independent,  ,  ,  ,  ,  ,  , Ambulation Assistance: Independent, Transfer Assistance: Independent, Active : Yes     HOME SUPPORT: Patient lived w/ significant other.    Occupational Therapy Goals:  Initiated 6/14/2024  1.  Patient will perform grooming while standing at sink with Contact Guard Assist within 7 day(s).  2.  Patient will perform upper body dressing with Supervision within 7 day(s).  3.  Patient will perform lower body dressing with Minimal Assist within 7 day(s).  4.  Patient will perform toilet transfers with Contact Guard Assist  within 7 day(s).  5.  Patient will perform all aspects of toileting with Contact Guard Assist within 7 day(s).  6.  Patient will participate in upper extremity therapeutic exercise/activities with Stand by Assist for 5 minutes within 7 day(s).    7.  Patient will utilize energy conservation techniques during functional activities with verbal cues within 7 day(s).   6/14/2024 1221 by Ralph Bello, OT  Outcome: Progressing

## 2024-06-14 NOTE — PLAN OF CARE
Problem: Physical Therapy - Adult  Goal: By Discharge: Performs mobility at highest level of function for planned discharge setting.  See evaluation for individualized goals.  Description: FUNCTIONAL STATUS PRIOR TO ADMISSION: Patient was independent and active without use of DME.    HOME SUPPORT PRIOR TO ADMISSION: The patient lived with significant other and 3 children.    Physical Therapy Goals  Initiated 6/14/2024  1.  Patient will move from supine to sit and sit to supine  and scoot up and down in bed with modified independence within 7 day(s).    2.  Patient will transfer from bed to chair and chair to bed with modified independence using the least restrictive device within 7 day(s).  3.  Patient will perform sit to stand with modified independence within 7 day(s).  4.  Patient will ambulate with modified independence for 100 feet with the least restrictive device within 7 day(s).   5.  Patient will ascend/descend 15 stairs with one handrail(s) with supervision/set-up within 7 day(s).    Outcome: Progressing   PHYSICAL THERAPY EVALUATION    Patient: Diana Snell (33 y.o. female)  Date: 6/14/2024  Primary Diagnosis: Acute and chr resp failure, unsp w hypoxia or hypercapnia (HCC) [J96.20]  Pneumonia due to infectious organism, unspecified laterality, unspecified part of lung [J18.9]  Acute pulmonary embolism without acute cor pulmonale, unspecified pulmonary embolism type (HCC) [I26.99]       Precautions: Restrictions/Precautions: Fall Risk                      ASSESSMENT :   DEFICITS/IMPAIRMENTS:   The patient is limited by decreased functional mobility, activity tolerance, endurance, increased pain levels, and severe dyspnea with minimal activity on day 1 of admission with respiratory failure associated with PE and possible R lung PNA.      Based on the impairments listed above she presents well below baseline activity tolerance and requires up to CGA for limited functional mobility. Severe R lateral

## 2024-06-14 NOTE — CONSULTS
albuterol (PROVENTIL) (2.5 MG/3ML) 0.083% nebulizer solution 2.5 mg  2.5 mg Nebulization Q4H PRN North BraddockLui kyle APRN - CNP        morphine (PF) injection 2 mg  2 mg IntraVENous Q4H PRN [place] Braddock[place]ui kyle APRN - CNP   2 mg at 06/14/24 0622    Or    morphine sulfate (PF) injection 4 mg  4 mg IntraVENous Q4H PRN [place] BraddockLui kyle APRN - CNP        oxyCODONE (ROXICODONE) immediate release tablet 5 mg  5 mg Oral Q4H PRN [place] BraddockLui kyle APRN - CNP        Or    oxyCODONE (ROXICODONE) immediate release tablet 10 mg  10 mg Oral Q4H PRN [place] BraddockLui kyle APRN - CNP        ipratropium 0.5 mg-albuterol 2.5 mg (DUONEB) nebulizer solution 1 Dose  1 Dose Inhalation BID [place] BraddockLui kyle APRN - CNP   1 Dose at 06/14/24 0720    guaiFENesin (MUCINEX) extended release tablet 600 mg  600 mg Oral BID North BraddockLui APRN - CNP   600 mg at 06/14/24 0910    benzonatate (TESSALON) capsule 100 mg  100 mg Oral TID PRN [place] BraddockLui kyle APRN - CNP        glucose chewable tablet 16 g  4 tablet Oral PRN [place] BraddockLui kyle APRN - CNP        dextrose bolus 10% 125 mL  125 mL IntraVENous PRN [place] BraddockLui kyle APRN - CNP        Or    dextrose bolus 10% 250 mL  250 mL IntraVENous PRN [place] BraddockLui kyle APRN - CNP        glucagon injection 1 mg  1 mg SubCUTAneous PRN [place] BraddockLui kyle APRN - CNP        dextrose 10 % infusion   IntraVENous Continuous PRN [place] Bradd[place]ui kyle APRN - CNP            Allergies   Allergen Reactions    Shellfish Allergy Swelling          Objective:     Patient Vitals for the past 8 hrs:   BP Temp Temp src Pulse Resp SpO2   06/14/24 1030 (!) 136/95 97.7 °F (36.5 °C) Oral (!) 112 30 95 %   06/14/24 1000 (!) 145/92 -- -- (!) 118 30 93 %   06/14/24 0940 (!) 130/97 -- -- (!) 122 30 92 %   06/14/24 0900 (!) 145/93 -- -- (!) 117 (!) 35 91 %   06/14/24 0745 137/83 98.1 °F (36.7 °C) Oral (!) 110 (!) 32 94 %       Lab Results   Component Value Date    WBC 15.5 (H) 06/14/2024    HGB 7.9 (L)  06/14/2024    HCT 27.3 (L) 06/14/2024    MCV 75.8 (L) 06/14/2024     06/14/2024       Physical Exam:   BP (!) 136/95   Pulse (!) 112   Temp 97.7 °F (36.5 °C) (Oral)   Resp 30   Ht 1.7 m (5' 6.93\")   Wt 114 kg (251 lb 4.8 oz)   SpO2 95%   BMI 39.44 kg/m²   General appearance: alert, appears stated age, cooperative, and morbidly obese  Abdomen: soft, non-tender; bowel sounds normal; no masses,  no organomegaly  Neurologic: Grossly normal    Assessment:     Recurrent PE  DVT diagnosed in 2018 and was thought to be related to birth control use  Was seen by Dr. Dodson previously for hypercoagulable workup which was negative in 2022  Has been maintained on Xarelto, recently ran out of her medication and was unable to afford the cost so she missed several doses    CTA chest  IMPRESSION:  1.  Subacute to chronic appearing bilateral pulmonary emboli in the upper and  lower lobes.  2.  Peripheral heterogeneous groundglass opacity in the right lower lung may  represent pneumonitis or pulmonary infarction.    History of STELLA  History of heavy menorrhagia   Reports she usually stops her blood thinners for at least 3 to 4 days when she is menstruating    Plan:   > Evaluated by pulmonology, agree with plan for lower extremity Dopplers  > Iron profile indicative of STELLA, transfuse with 300 mg IV Venofer x 3 days  > Agree with heparin drip for now   > Unfortunately patient is unable to afford DOAC, would agree for plan to bridge with warfarin as she will require lifelong anticoagulation  > Refer back to Dr. Dodson for further management of STELLA as OP     Thank you for allowing us to participate in the care of Ms. Snell. Will sign off.       RODO Huddleston - NP

## 2024-06-14 NOTE — CONSULTS
[facility] PROGRESS NOTE  Pulmonary, Critical Care, and Sleep Medicine    Name: Diana Snell MRN: [id number]   : 1990 Hospital: [facility]   Date: 2024  Admission Date: 2024     Chart and notes reviewed. Data reviewed. I have evaluated and examined the patient.     Asked to consult on patient by Dr. Talbot for \"recurrent PE\".    HPI: 33 year old female with a history of PE in 2018 on xarelto, cervical cancer, HTN during pregnancy, asthma, anemia, depression, TOSHIA w/ home cpap who presented to the ED with worsening shortness of breath and right sided chest pain. She also reports a dry cough for several weeks. Denies any fevers/chills or any recent sick contacts. She notes due to insurance change/coverage/cost, she has not taken her medications for the past couple of weeks, last dose was on  or . She denies any intolerance w/ Xarelto except heavier menstrual cycle with the medication.    24 Chest CTA: bilateral PE predominantly in the lower lobes and the segmental and subsegmental branches with peripheral recanalization, appearing subacute to chronic. To a lesser degree, there are small subsegmental emboli in the upper lobe pulmonary arteries. Peripheral heterogenous groundglass opacity in the right lower lobe.    Troponin elevated  Leukocytosis wbc 15.5  Covid-19 and flu negative    PMH as per HPI.  Social history: Occasional social smoker for 8 years.Occasional/social etoh use. Denies any recreastional or illicit drug use.   Family history: No known lung disease or blood clots     ROS: She reports unchanged SOB and right sided chest pain radiating under her breast. Otherwise 12-point review of systems is negative.     Vital Signs:  BP (!) 136/95   Pulse (!) 112   Temp 97.7 °F (36.5 °C) (Oral)   Resp 30   Ht 1.7 m (5' 6.93\")   Wt 114 kg (251 lb 4.8 oz)   SpO2 95%   BMI 39.44 kg/m²             Temp (24hrs), Av.8 °F (37.1 °C),

## 2024-06-14 NOTE — ED NOTES
Pt placed in gown on monitor x3. Pt 86% on RA. Pt placed on 3L NC and is now at 96% with expressed relief.   
Spoke with Kahlil from pharmacy regarding heparin drip. Pt is to receive a 9,100 unit bolus and 18 units/kg/hr infusion  
Verbal report given given to DONALD Zamora. All questions answered at this time.   
in time range)   morphine (PF) injection 2 mg (has no administration in time range)     Or   morphine sulfate (PF) injection 4 mg (has no administration in time range)   oxyCODONE (ROXICODONE) immediate release tablet 5 mg (has no administration in time range)     Or   oxyCODONE (ROXICODONE) immediate release tablet 10 mg (has no administration in time range)   ipratropium 0.5 mg-albuterol 2.5 mg (DUONEB) nebulizer solution 1 Dose (has no administration in time range)   guaiFENesin (MUCINEX) extended release tablet 600 mg (600 mg Oral Given 6/13/24 2245)   benzonatate (TESSALON) capsule 100 mg (has no administration in time range)   glucose chewable tablet 16 g (has no administration in time range)   dextrose bolus 10% 125 mL (has no administration in time range)     Or   dextrose bolus 10% 250 mL (has no administration in time range)   glucagon injection 1 mg (has no administration in time range)   dextrose 10 % infusion (has no administration in time range)   insulin lispro (HUMALOG,ADMELOG) injection vial 0-8 Units (has no administration in time range)   insulin lispro (HUMALOG,ADMELOG) injection vial 0-4 Units (0 Units SubCUTAneous Held 6/13/24 2255)   azithromycin (ZITHROMAX) 500 mg in sodium chloride 0.9 % 250 mL IVPB ([id number]) ( IntraVENous Stopped 6/13/24 2108)   sodium chloride 0.9 % bolus 3,411 mL (0 mLs IntraVENous Stopped 6/13/24 2317)   heparin (porcine) injection 9,100 Units (9,100 Units IntraVENous Given 6/13/24 2249)   morphine sulfate (PF) injection 4 mg (4 mg IntraVENous Given 6/13/24 2245)     Last documented pain medication administration: 2245, Morphine  Pertinent or High Risk Medications/Drips: no   If Yes, please provide details: Heparin  Blood Product Administration: no  If Yes, please provide details:   Process Protocols/Bundles:     Recommendation  Incomplete STAT orders: none  Overdue Medications: neb treatment   Patient Belongings:  Pt has clothing, cell phone, and purse  Additional

## 2024-06-14 NOTE — CONSULTS
Pt seen and examined. Full consult dictated      Needs echo to assess RV fxn and PA pressures     If OK schedule for Lexiscan nuclear stress

## 2024-06-14 NOTE — PROGRESS NOTES
ADULT PROTOCOL: JET AEROSOL ASSESSMENT    Patient  Diana Snell     33 y.o.   female     6/14/2024  11:42 AM    Breath Sounds Pre Procedure: Breath Sounds Pre-Tx KIM: Diminished                                  Breath Sounds Pre-Tx LLL: Diminished        Breath Sounds Pre-Tx RUL: Diminished        Breath Sounds Pre-Tx RML: Diminished        Breath Sounds Pre-Tx RLL: Diminished  Breath Sounds Post Procedure: Breath Sounds Post-Tx KIM: Diminished          Breath Sounds Post-Tx LLL: Diminished          Breath Sounds Post-Tx RUL: Diminished          Breath Sounds Post-Tx RML: Diminished          Breath Sounds Post-Tx RLL: Diminished                                       Heart Rate: Pre procedure Pre-Tx Pulse: 107           Post procedure Post-Tx Pulse: 109    Resp Rate: Pre procedure Pre-Tx Resps: 30           Post procedure Post-Tx Resps: 27        Oxygen: O2 Therapy: Oxygen   nasal cannula     Changed: Yes    SpO2:  SpO2: 95 %   with Oxygen                Nebulizer Therapy: Current medications Medications: Albuterol/Ipratropium      Changed: No        Problem List:   Patient Active Problem List   Diagnosis    Long term (current) use of anticoagulants    Anteversion of uterus    Recurrent acute deep vein thrombosis (DVT) of lower extremity, unspecified laterality (HCC)    TOSHIA (obstructive sleep apnea)    Tachycardia    Supraumbilical hernia    Anxiety    History of pulmonary embolism    Obesity (BMI 35.0-39.9 without comorbidity)    Scoliosis of thoracic spine    Anemia    Acute deep vein thrombosis (DVT) of left peroneal vein (HCC)    Ovarian cyst, right    Menorrhagia with regular cycle    Recurrent pulmonary emboli (HCC)    Iron deficiency anemia due to chronic blood loss    Elevated blood pressure reading    Other situational type phobia    Morbid obesity (HCC)    Acute pulmonary embolism without acute cor pulmonale, unspecified pulmonary embolism type (HCC)       Respiratory Therapist: Liza Petty RT

## 2024-06-14 NOTE — CONSULTS
[facility]              [address] [place] [address]                              CONSULTATION      PATIENT NAME: BEN MCCLAIN              : 1990  MED REC NO: [id number]                       ROOM: 2309  ACCOUNT NO: [id number]                       ADMIT DATE: 2024  PROVIDER: Bry Lara MD    DATE OF SERVICE:  2024    ATTENDING PHYSICIAN:  BRADY OLIVAS    REASON FOR CONSULTATION:  Evaluate abnormal troponin.    CHIEF COMPLAINT:  Shortness of breath.    HISTORY OF PRESENT ILLNESS:  The patient is a 33-year-old female with a history of prior pulmonary embolism and apparent chronic pulmonary emboli.  She also has a history of hypertension and sleep apnea, for which she wears CPAP.  She has no history of coronary artery disease.  No history of diabetes or known dyslipidemia.  She was admitted after presenting to the ER with shortness of breath, vague chest tightness, and dizziness.  She ran out of her Xarelto recently and was off this medication for at least a few days.  In the emergency room, her EKG showed sinus tachycardia.  A CTA of the chest showed bilateral pulmonary emboli with evidence of chronic pulmonary emboli and possibly new acute pulmonary emboli.  She was started on IV heparin.  There was also question of pneumonia and she was started on IV antibiotics.  She has not had any real fever or productive cough.  She says she is normally physically active and works as a nursing assistant.  A prior echo in 2019, showed a normal EF.  Troponins were initially 166 and increased to 239.  Most recent troponin was 228.  I was asked to see the patient for evaluation.  No obvious ischemic EKG changes, but the patient does have a baseline sinus tachycardia.    PAST MEDICAL HISTORY:  As noted above.  History of cervical cancer with prior surgery; history of sleep apnea, on CPAP; and history of asthma.    PRIOR SURGERY:  Status post

## 2024-06-14 NOTE — CARE COORDINATION
Care Management Initial Assessment       RUR: 13% (Moderate RUR)  Readmission? No  1st IM letter given? No  1st  letter given: No     06/14/24 0849   Service Assessment   Patient Orientation Alert and Oriented;Person;Place;Situation;Self   Cognition Alert   History Provided By Patient   Primary Caregiver Self   Accompanied By/Relationship Osvaldo Akers)  [phone]   Support Systems Children;Spouse/Significant Other  (Osvaldo Akers) [phone] with three children, ages 17, 14, and 6)   Patient's Healthcare Decision Maker is: Legal Next of Kin   PCP Verified by CM Yes   Last Visit to PCP Within last 3 months   Prior Functional Level Independent in ADLs/IADLs   Current Functional Level Independent in ADLs/IADLs   Can patient return to prior living arrangement Yes   Ability to make needs known: Good   Family able to assist with home care needs: Yes   Would you like for me to discuss the discharge plan with any other family members/significant others, and if so, who? Yes  (Osvaldo Akers) [phone])   Financial Resources None   Community Resources None   CM/SW Referral Disease Management Education   Social/Functional History   Lives With Significant other  (Osvaldo Akers) [phone])   Type of Home Apartment   Home Access Stairs to enter with rails   Entrance Stairs - Number of Steps 15 CAMACHO   Entrance Stairs - Rails Right   Home Equipment None  (Nebulizer and a CPAP machine)   Active  Yes   Discharge Planning   Type of Residence Apartment   Living Arrangements Spouse/Significant Other   Current Services Prior To Admission C-pap   Potential Assistance Needed N/A   Patient expects to be discharged to: Apartment     The  (CM) conducted an initial meeting with the patient at the bedside, formally introducing themselves and clarifying their role in discharge planning and transitional care. Demographic and insurance details were verified for

## 2024-06-14 NOTE — PROGRESS NOTES
End of Shift Note    Bedside shift change report given to DONALD Charles  (oncoming nurse) by AL and  Zachary Christiansen RN (offgoing nurse).  Report included the following information SBAR, MAR, Cardiac Rhythm  , and Quality Measures    Shift worked:  [id number]     Shift summary and any significant changes:     An admission today from ED,admitted with complaints of right lower chest pain, pleuritic. She also has middle chest pain that is worse with drinking. She also has RLQ abdominal pain.  CTA chest revealed bilateral pulmonary emboli in upper and lowe lobes she also has   Peripheral heterogeneous groundglass opacity in the right lower lung may   represent pneumonitis or pulmonary infarction.Currently she is on heparin infusion at 16 units/kg hr. Factor Xa done this morning was 0.96          Concerns for physician to address:       Zone phone for oncoming shift:          Activity:     Number times ambulated in hallways past shift: 0  Number of times OOB to chair past shift: 0    Cardiac:   Cardiac Monitoring: Yes           Access:  Current line(s): PIV     Genitourinary:   Urinary status: external catheter    Respiratory:      Chronic home O2 use?: YES  Incentive spirometer at bedside: NO       GI:     Current diet:  ADULT DIET; Clear Liquid  Passing flatus: YES  Tolerating current diet: YES       Pain Management:   Patient states pain is manageable on current regimen: YES    Skin:     Interventions: float heels, limit briefs, and nutritional support    Patient Safety:  Fall Score:    Interventions: bed/chair alarm       Length of Stay:  Expected LOS: 3  Actual LOS: 1      Zachary Christiansen RN

## 2024-06-14 NOTE — PROGRESS NOTES
TRANSFER - IN REPORT:     Verbal report received from DONALD Zamora on (patient)  being received from ED (unit) for routine progression of patient care       Report consisted of patient's Situation, Background, Assessment and   Recommendations(SBAR).      Information from the following report(s) Nurse Handoff Report, MAR, and Cardiac Rhythm AV Paced  was reviewed with the receiving nurse.     Opportunity for questions and clarification was provided.       Assessment completed upon patient's arrival to unit and care assumed.       4 Eyes Skin Assessment     NAME:  Diana Snell  YOB: 1990  MEDICAL RECORD NUMBER:  [id number]    The patient is being assessed for  Admission    I agree that at least one RN has performed a thorough Head to Toe Skin Assessment on the patient. ALL assessment sites listed below have been assessed.      Areas assessed by both nurses:    Head, Face, Ears, Shoulders, Back, Chest, Arms, Elbows, Hands, Sacrum. Buttock, Coccyx, Ischium, Legs. Feet and Heels, Under Medical Devices , and Other mouth        Does the Patient have a Wound? No noted wound(s)       Martell Prevention initiated by RN: Yes  Wound Care Orders initiated by RN: No    Pressure Injury (Stage 3,4, Unstageable, DTI, NWPT, and Complex wounds) if present, place Wound referral order by RN under : No    New Ostomies, if present place, Ostomy referral order under : No     Nurse 1 eSignature: Electronically signed by Ludivina Corona RN on 6/14/24 at 1:03 AM EDT    **SHARE this note so that the co-signing nurse can place an eSignature**    Nurse 2 eSignature: Electronically signed by Kae Mchugh RN on 6/14/24 at 1:06 AM EDT

## 2024-06-15 LAB
ANION GAP SERPL CALC-SCNC: 6 MMOL/L (ref 5–15)
BASOPHILS # BLD: 0 K/UL (ref 0–0.1)
BASOPHILS NFR BLD: 0 % (ref 0–1)
BUN SERPL-MCNC: 3 MG/DL (ref 6–20)
BUN/CREAT SERPL: 6 (ref 12–20)
CALCIUM SERPL-MCNC: 8.8 MG/DL (ref 8.5–10.1)
CHLORIDE SERPL-SCNC: 106 MMOL/L (ref 97–108)
CO2 SERPL-SCNC: 24 MMOL/L (ref 21–32)
CREAT SERPL-MCNC: 0.49 MG/DL (ref 0.55–1.02)
DIFFERENTIAL METHOD BLD: ABNORMAL
EOSINOPHIL # BLD: 0.1 K/UL (ref 0–0.4)
EOSINOPHIL NFR BLD: 1 % (ref 0–7)
ERYTHROCYTE [DISTWIDTH] IN BLOOD BY AUTOMATED COUNT: 19.1 % (ref 11.5–14.5)
GLUCOSE SERPL-MCNC: 98 MG/DL (ref 65–100)
HCT VFR BLD AUTO: 27.8 % (ref 35–47)
HGB BLD-MCNC: 7.9 G/DL (ref 11.5–16)
IMM GRANULOCYTES # BLD AUTO: 0.1 K/UL (ref 0–0.04)
IMM GRANULOCYTES NFR BLD AUTO: 1 % (ref 0–0.5)
LYMPHOCYTES # BLD: 2.7 K/UL (ref 0.8–3.5)
LYMPHOCYTES NFR BLD: 20 % (ref 12–49)
MAGNESIUM SERPL-MCNC: 2.4 MG/DL (ref 1.6–2.4)
MCH RBC QN AUTO: 21.8 PG (ref 26–34)
MCHC RBC AUTO-ENTMCNC: 28.4 G/DL (ref 30–36.5)
MCV RBC AUTO: 76.8 FL (ref 80–99)
MONOCYTES # BLD: 1.1 K/UL (ref 0–1)
MONOCYTES NFR BLD: 8 % (ref 5–13)
NEUTS SEG # BLD: 9.3 K/UL (ref 1.8–8)
NEUTS SEG NFR BLD: 70 % (ref 32–75)
NRBC # BLD: 0.02 K/UL (ref 0–0.01)
NRBC BLD-RTO: 0.1 PER 100 WBC
PLATELET # BLD AUTO: 234 K/UL (ref 150–400)
PMV BLD AUTO: 10.6 FL (ref 8.9–12.9)
POTASSIUM SERPL-SCNC: 3.5 MMOL/L (ref 3.5–5.1)
RBC # BLD AUTO: 3.62 M/UL (ref 3.8–5.2)
RBC MORPH BLD: ABNORMAL
SODIUM SERPL-SCNC: 136 MMOL/L (ref 136–145)
UFH PPP CHRO-ACNC: 0.66 IU/ML
WBC # BLD AUTO: 13.3 K/UL (ref 3.6–11)

## 2024-06-15 PROCEDURE — 85025 COMPLETE CBC W/AUTO DIFF WBC: CPT

## 2024-06-15 PROCEDURE — 97116 GAIT TRAINING THERAPY: CPT

## 2024-06-15 PROCEDURE — 6370000000 HC RX 637 (ALT 250 FOR IP): Performed by: STUDENT IN AN ORGANIZED HEALTH CARE EDUCATION/TRAINING PROGRAM

## 2024-06-15 PROCEDURE — 2700000000 HC OXYGEN THERAPY PER DAY

## 2024-06-15 PROCEDURE — 2580000003 HC RX 258: Performed by: NURSE PRACTITIONER

## 2024-06-15 PROCEDURE — 6370000000 HC RX 637 (ALT 250 FOR IP): Performed by: NURSE PRACTITIONER

## 2024-06-15 PROCEDURE — 94660 CPAP INITIATION&MGMT: CPT

## 2024-06-15 PROCEDURE — 80048 BASIC METABOLIC PNL TOTAL CA: CPT

## 2024-06-15 PROCEDURE — 94640 AIRWAY INHALATION TREATMENT: CPT

## 2024-06-15 PROCEDURE — 6360000002 HC RX W HCPCS: Performed by: NURSE PRACTITIONER

## 2024-06-15 PROCEDURE — 97530 THERAPEUTIC ACTIVITIES: CPT

## 2024-06-15 PROCEDURE — 83735 ASSAY OF MAGNESIUM: CPT

## 2024-06-15 PROCEDURE — 85520 HEPARIN ASSAY: CPT

## 2024-06-15 PROCEDURE — 36415 COLL VENOUS BLD VENIPUNCTURE: CPT

## 2024-06-15 PROCEDURE — 2060000000 HC ICU INTERMEDIATE R&B

## 2024-06-15 RX ORDER — WARFARIN SODIUM 5 MG/1
10 TABLET ORAL
Status: COMPLETED | OUTPATIENT
Start: 2024-06-15 | End: 2024-06-15

## 2024-06-15 RX ORDER — CASTOR OIL AND BALSAM, PERU 788; 87 MG/G; MG/G
OINTMENT TOPICAL 2 TIMES DAILY
Status: DISCONTINUED | OUTPATIENT
Start: 2024-06-15 | End: 2024-06-21 | Stop reason: HOSPADM

## 2024-06-15 RX ADMIN — GUAIFENESIN 600 MG: 600 TABLET, EXTENDED RELEASE ORAL at 20:29

## 2024-06-15 RX ADMIN — SODIUM CHLORIDE, PRESERVATIVE FREE 10 ML: 5 INJECTION INTRAVENOUS at 21:27

## 2024-06-15 RX ADMIN — GUAIFENESIN 600 MG: 600 TABLET, EXTENDED RELEASE ORAL at 08:11

## 2024-06-15 RX ADMIN — IRON SUCROSE 300 MG: 20 INJECTION, SOLUTION INTRAVENOUS at 16:36

## 2024-06-15 RX ADMIN — IPRATROPIUM BROMIDE AND ALBUTEROL SULFATE 1 DOSE: .5; 3 SOLUTION RESPIRATORY (INHALATION) at 19:58

## 2024-06-15 RX ADMIN — WARFARIN SODIUM 10 MG: 5 TABLET ORAL at 18:46

## 2024-06-15 RX ADMIN — HEPARIN SODIUM AND DEXTROSE 16 UNITS/KG/HR: 10000; 5 INJECTION INTRAVENOUS at 16:39

## 2024-06-15 RX ADMIN — SODIUM CHLORIDE 1000 MG: 900 INJECTION INTRAVENOUS at 20:27

## 2024-06-15 RX ADMIN — IPRATROPIUM BROMIDE AND ALBUTEROL SULFATE 1 DOSE: .5; 3 SOLUTION RESPIRATORY (INHALATION) at 09:59

## 2024-06-15 RX ADMIN — HEPARIN SODIUM AND DEXTROSE 16 UNITS/KG/HR: 10000; 5 INJECTION INTRAVENOUS at 02:35

## 2024-06-15 RX ADMIN — SODIUM CHLORIDE, PRESERVATIVE FREE 10 ML: 5 INJECTION INTRAVENOUS at 08:12

## 2024-06-15 RX ADMIN — AZITHROMYCIN MONOHYDRATE 500 MG: 500 INJECTION, POWDER, LYOPHILIZED, FOR SOLUTION INTRAVENOUS at 21:27

## 2024-06-15 RX ADMIN — Medication: at 20:28

## 2024-06-15 ASSESSMENT — PAIN DESCRIPTION - DESCRIPTORS: DESCRIPTORS: ACHING

## 2024-06-15 ASSESSMENT — PAIN DESCRIPTION - LOCATION: LOCATION: RIB CAGE

## 2024-06-15 ASSESSMENT — PAIN SCALES - GENERAL
PAINLEVEL_OUTOF10: 0
PAINLEVEL_OUTOF10: 2
PAINLEVEL_OUTOF10: 3
PAINLEVEL_OUTOF10: 4
PAINLEVEL_OUTOF10: 0

## 2024-06-15 ASSESSMENT — PAIN DESCRIPTION - ORIENTATION: ORIENTATION: RIGHT

## 2024-06-15 NOTE — PROGRESS NOTES
End of Shift Note    Bedside shift change report given to Araceli BENNETT  (oncoming nurse) by AL and Zachary Christiansen RN (offgoing nurse).  Report included the following information SBAR, Intake/Output, MAR, Recent Results, Med Rec Status, Cardiac Rhythm NSR, and Quality Measures    Shift worked:  1900HRS-0700HRS     Shift summary and any significant changes:     Well settled so far,tolerated cpap well overnight,looks calm, respiratory panel results came negative,Vascular duplex of lower extremities revealed  acute non occlusive deep venous thrombosis in the right distal femoral vein, acute non occlusive deep venous trombosis in right popliteal vein ,there was no evidence of  DVT in left lower extermities. Factor Xa remain to be therapeutic with latest being 0.66 and no change in heparin infusion.Weight this morning was 258 pounds hence inaccurate as the bed wasn't zeroed ,as the patient was already in bed            Zone phone for oncoming shift:          Activity:     Number times ambulated in hallways past shift: 0  Number of times OOB to chair past shift: 0    Cardiac:   Cardiac Monitoring: Yes           Access:  Current line(s): PIV     Genitourinary:   Urinary status: external catheter    Respiratory:      Chronic home O2 use?: YES  Incentive spirometer at bedside: YES       GI:     Current diet:  ADULT DIET; Regular  Passing flatus: YES  Tolerating current diet: YES       Pain Management:   Patient states pain is manageable on current regimen: YES    Skin:     Interventions: float heels    Patient Safety:  Fall Score:    Interventions: gripper socks and pt to call before getting OOB       Length of Stay:  Expected LOS: 3  Actual LOS: 2      Zachary Christiansen RN

## 2024-06-15 NOTE — PROGRESS NOTES
Physician Progress Note      PATIENT:               BEN MCCLAIN  [facility] #:                  [id number]  :                       1990  ADMIT DATE:       2024 5:45 PM  DISCH DATE:  RESPONDING  PROVIDER #:        Paul House MD          QUERY TEXT:    Patient admitted with PE. Noted documentation of PNA. In order to support the   diagnosis of PNA, please include additional clinical indicators in your   documentation.  Or please document if the diagnosis of PNA has been ruled out   after further study.    The medical record reflects the following:  Risk Factors: ARF  PE    Clinical Indicators:  ED IMPRESSION    2. Pneumonia due to infectious organism, unspecified laterality, unspecified   part of lung    CTA CHEST W WO CONTRAST  1.  Subacute to chronic appearing bilateral pulmonary emboli in the upper and  lower lobes.  2.  Peripheral heterogeneous groundglass opacity in the right lower lung may  represent pneumonitis or pulmonary infarction    CXR  New airspace disease at the right lung base is consistent with pneumonia  and/or pulmonary infarct.    t max 99.5  RR 22-46  HR   //102  O2 sat 88% ra placed on 3lnc sat 99%    Treatment:  azithromycin (ZITHROMAX) 500 mg in sodium chloride 0.9 % 250 mL IVPB   ([id number])  cefTRIAXone (ROCEPHIN) 1,000 mg in sodium chloride 0.9 % 50 mL IVPB (mini-bag)    Thank you,  Alvina Ramirez RN CDI  CRCR  Clinical Documentation  [phone] or via Perfect Serve  [email or web address]  Options provided:  -- PNA was ruled out  -- PNA present as evidenced by, Please document evidence.  -- Other - I will add my own diagnosis  -- Disagree - Not applicable / Not valid  -- Disagree - Clinically unable to determine / Unknown  -- Refer to Clinical Documentation Reviewer    PROVIDER RESPONSE TEXT:    PNA is present as evidenced by CT scan findings    Query created by: Alvina Ramirez on 2024 3:31 PM      Electronically signed by:  Paul

## 2024-06-15 NOTE — PROGRESS NOTES
Pharmacist Daily Dosing of Warfarin    Indication & Goal INR: PE, INR Goal 2-3    PTA Warfarin Dose: new start    Notable concurrent conditions and medications: azithromycin    Labs:  Recent Labs     Units 06/15/24  0431 06/14/24  0455 06/13/24  2248 06/13/24  2227   INR    --  1.1  --  1.1   HGB g/dL 7.9* 7.9* 8.0*  --    PLT K/uL 234 217 193  --          Impression/Plan:   Will order warfarin 10 mg PO x 1 dose.  Bridging with heparin  Daily INR has been ordered  CBC w/o differential every other day has been ordered     Pharmacy will follow daily and adjust the dose as appropriate.    Thank you,  [facility] located [facility]      Warfarin Protocol    Located on pharmacy Teams site: Clinical Practice -> Anticoagulation & Cardiology -> Anticoagulation Policies, Protocols, Guidance

## 2024-06-15 NOTE — PROGRESS NOTES
End of Shift Note    Bedside shift change report given to DONALD Sharma  (oncoming nurse) by Manuel Camacho RN (offgoing nurse).  Report included the following information SBAR    Shift worked:  7a-7p     Shift summary and any significant changes:     Patient heparin infusion therapeutic and running at 16 units/kg all shift. Started warfarin this shift.      Concerns for physician to address:  none     Zone phone for oncoming shift:   [phone]       Activity:     Number times ambulated in hallways past shift: 0  Number of times OOB to chair past shift: 1    Cardiac:   Cardiac Monitoring: Yes           Access:  Current line(s): PIV     Genitourinary:   Urinary status: voiding and external catheter    Respiratory:      Chronic home O2 use?: NO  Incentive spirometer at bedside: YES       GI:     Current diet:  ADULT DIET; Regular  Passing flatus: YES  Tolerating current diet: YES       Pain Management:   Patient states pain is manageable on current regimen: YES    Skin:     Interventions: increase time out of bed    Patient Safety:  Fall Score:    Interventions: gripper socks, pt to call before getting OOB, and stay with me (per policy)       Length of Stay:  Expected LOS: 3  Actual LOS: 2      Manuel Camacho RN

## 2024-06-15 NOTE — PROGRESS NOTES
Pharmacist Elif called to confirm therapeutic Heparin infusion. Confirmed daily anti Xa going forward.

## 2024-06-15 NOTE — PLAN OF CARE
Problem: Physical Therapy - Adult  Goal: By Discharge: Performs mobility at highest level of function for planned discharge setting.  See evaluation for individualized goals.  Description: FUNCTIONAL STATUS PRIOR TO ADMISSION: Patient was independent and active without use of DME.    HOME SUPPORT PRIOR TO ADMISSION: The patient lived with significant other and 3 children.    Physical Therapy Goals  Initiated 6/14/2024  1.  Patient will move from supine to sit and sit to supine  and scoot up and down in bed with modified independence within 7 day(s).    2.  Patient will transfer from bed to chair and chair to bed with modified independence using the least restrictive device within 7 day(s).  3.  Patient will perform sit to stand with modified independence within 7 day(s).  4.  Patient will ambulate with modified independence for 100 feet with the least restrictive device within 7 day(s).   5.  Patient will ascend/descend 15 stairs with one handrail(s) with supervision/set-up within 7 day(s).    Outcome: Progressing   PHYSICAL THERAPY TREATMENT    Patient: Diana Snell (33 y.o. female)  Date: 6/15/2024  Diagnosis: Acute and chr resp failure, unsp w hypoxia or hypercapnia (HCC) [J96.20]  Pneumonia due to infectious organism, unspecified laterality, unspecified part of lung [J18.9]  Acute pulmonary embolism without acute cor pulmonale, unspecified pulmonary embolism type (HCC) [I26.99] Acute pulmonary embolism without acute cor pulmonale, unspecified pulmonary embolism type (HCC)      Precautions: Fall Risk                      ASSESSMENT:  Patient continues to benefit from skilled PT services and is progressing towards goals. Pt received semi fowlers in bed, agreeable to participate in therapy. Received on 3L O2, saturating 87-95% throughout mobility. Per pt, she has been able to pivot self to BSC with RN. With RN permission, able to progress with stand pivot onto L leg to BSC and then to chair with no

## 2024-06-15 NOTE — PROGRESS NOTES
[facility]     Progress Note      6/15/2024 11:49 AM  NAME: Diana Snell   MRN:  [id number]   Admit Diagnosis: Acute and chr resp failure, unsp w hypoxia or hypercapnia (HCC) [J96.20]  Pneumonia due to infectious organism, unspecified laterality, unspecified part of lung [J18.9]  Acute pulmonary embolism without acute cor pulmonale, unspecified pulmonary embolism type (HCC) [I26.99]     Problem List:     IMPRESSION:    1. Mildly elevated troponin of uncertain cause, possible recurrent pulmonary emboli versus acute coronary syndrome.  2. History of chronic pulmonary emboli as demonstrated by CT.  3. Hypertension.  4. Sleep apnea, on CPAP.  5. Obesity.  6. Anemia.  7. Remote history of cervical cancer with prior surgery.        Assessment/Plan:     - Increased troponin likely due to PE    - Cont anticoagulation    Echo pending.          [x]       High complexity decision making was performed in this patient at high risk for decompensation with multiple organ involvement.    We discussed the expected course, resolution and complications of the diagnoses in detail.  Medication risk, benefits, costs, interactions, and alternatives were discussed as indicated.  I advised him to contact the office if his condition worsens, changes or fails to improve as anticipated.  Patient expressed understanding with the diagnoses  and plan.    Subjective:     Diana Snell denies chest pain, dyspnea.  Discussed with RN events overnight.     Review of Systems:    Symptom Y/N Comments  Symptom Y/N Comments   Fever/Chills N   Chest Pain N    Poor Appetite N   Edema N    Cough N   Abdominal Pain N    Sputum N   Joint Pain N    SOB/CIFUENTES N   Pruritis/Rash N    Nausea/vomit N   Tolerating PT/OT Y    Diarrhea N   Tolerating Diet Y    Constipation N   Other       Could NOT obtain due to:      Objective:      Physical Exam:    Last 24hrs VS reviewed since prior progress note. Most recent are:    BP (!) 152/93   Pulse  IntraVENous PRN    heparin 25,000 units in dextrose 5% 250 mL (premix) infusion  18-30 Units/kg/hr IntraVENous Continuous    cefTRIAXone (ROCEPHIN) 1,000 mg in sodium chloride 0.9 % 50 mL IVPB (mini-bag)  1,000 mg IntraVENous Q24H    azithromycin (ZITHROMAX) 500 mg in sodium chloride 0.9 % 250 mL IVPB ([id number])  500 mg IntraVENous Q24H    sodium chloride flush 0.9 % injection 5-40 mL  5-40 mL IntraVENous 2 times per day    sodium chloride flush 0.9 % injection 5-40 mL  5-40 mL IntraVENous PRN    0.9 % sodium chloride infusion   IntraVENous PRN    ondansetron (ZOFRAN-ODT) disintegrating tablet 4 mg  4 mg Oral Q8H PRN    Or    ondansetron (ZOFRAN) injection 4 mg  4 mg IntraVENous Q6H PRN    polyethylene glycol (GLYCOLAX) packet 17 g  17 g Oral Daily PRN    acetaminophen (TYLENOL) tablet 650 mg  650 mg Oral Q6H PRN    Or    acetaminophen (TYLENOL) suppository 650 mg  650 mg Rectal Q6H PRN    melatonin tablet 3 mg  3 mg Oral Nightly PRN    albuterol (PROVENTIL) (2.5 MG/3ML) 0.083% nebulizer solution 2.5 mg  2.5 mg Nebulization Q4H PRN    morphine (PF) injection 2 mg  2 mg IntraVENous Q4H PRN    Or    morphine sulfate (PF) injection 4 mg  4 mg IntraVENous Q4H PRN    oxyCODONE (ROXICODONE) immediate release tablet 5 mg  5 mg Oral Q4H PRN    Or    oxyCODONE (ROXICODONE) immediate release tablet 10 mg  10 mg Oral Q4H PRN    ipratropium 0.5 mg-albuterol 2.5 mg (DUONEB) nebulizer solution 1 Dose  1 Dose Inhalation BID    guaiFENesin (MUCINEX) extended release tablet 600 mg  600 mg Oral BID    benzonatate (TESSALON) capsule 100 mg  100 mg Oral TID PRN    glucose chewable tablet 16 g  4 tablet Oral PRN    dextrose bolus 10% 125 mL  125 mL IntraVENous PRN    Or    dextrose bolus 10% 250 mL  250 mL IntraVENous PRN    glucagon injection 1 mg  1 mg SubCUTAneous PRN    dextrose 10 % infusion   IntraVENous Continuous PRN         Adeel Billingsley MD

## 2024-06-15 NOTE — PROGRESS NOTES
Hospitalist Progress Note    NAME:   Diana Snell   : 1990   MRN: [id number]     Date/Time: 6/15/2024 2:22 PM  Patient PCP: Sonya Abreu MD    Estimated discharge date:   Barriers: weaning oxygen    Assessment / Plan:  ARF likely dt PE     ECHO pending  cxr suspected new rt airspace disease/pna vs pulmonary infarct  cta chest + subacute to chronic pulmonary emboli in upper and lower lobes  Ct a/p neg acute infectious processes or findings, has IUD in uterus as expected w/o complications  Covid and flu negative  Pulmonary and hematology consulted  Continue heparin drip  Was not taking xarelto d/t cost issue, will add coumadin today with heparin bridging     Elevated troponin:  Likely d/t PE  Cardiology was consulted by admitting provider     Mild acute hyponatremia, asymptomatic  -ivf as above,   -trend sodium level     Mild acute hyperglycemia w/o known h/o T2DM  Check hba1c, no need for sliding scale    Chronic anemia, hgb stable, w/o acute active bleeding, states known menorrhagia  -states holding the bld thinner for at least 3-4 days when having her cycle  -last time took xarelto was -, states ran out of medicaid, only covered for birth control  -monitor hgb, consider transfuse vs iron iv if hgb drops  -anemia profile in am   -consider follow-up outpt with gynecology/hematology      Medical Decision Making:   I personally reviewed labs: CBC, BMP  I personally reviewed imaging:  I personally reviewed EKG:  Toxic drug monitoring:   Discussed case with:     Code Status: Full  DVT Prophylaxis: Heparin drip  GI Prophylaxis:    Subjective:     Chief Complaint / Reason for Physician Visit  Seen and evaluated  Remains on 2 liters oxygen  Having SOB on exertion      Objective:     VITALS:   Last 24hrs VS reviewed since prior progress note. Most recent are:  Patient Vitals for the past 24 hrs:   BP Temp Temp src Pulse Resp SpO2 Weight   06/15/24 1106 (!) 152/93 98.5 °F (36.9 °C) Oral

## 2024-06-16 ENCOUNTER — APPOINTMENT (OUTPATIENT)
Facility: HOSPITAL | Age: 34
End: 2024-06-16
Payer: MEDICAID

## 2024-06-16 LAB
ANION GAP SERPL CALC-SCNC: 6 MMOL/L (ref 5–15)
BASOPHILS # BLD: 0 K/UL (ref 0–0.1)
BASOPHILS NFR BLD: 0 % (ref 0–1)
BUN SERPL-MCNC: 4 MG/DL (ref 6–20)
BUN/CREAT SERPL: 8 (ref 12–20)
CALCIUM SERPL-MCNC: 8.9 MG/DL (ref 8.5–10.1)
CHLORIDE SERPL-SCNC: 108 MMOL/L (ref 97–108)
CO2 SERPL-SCNC: 24 MMOL/L (ref 21–32)
CREAT SERPL-MCNC: 0.52 MG/DL (ref 0.55–1.02)
DIFFERENTIAL METHOD BLD: ABNORMAL
EOSINOPHIL # BLD: 0.1 K/UL (ref 0–0.4)
EOSINOPHIL NFR BLD: 1 % (ref 0–7)
ERYTHROCYTE [DISTWIDTH] IN BLOOD BY AUTOMATED COUNT: 18.9 % (ref 11.5–14.5)
GLUCOSE SERPL-MCNC: 96 MG/DL (ref 65–100)
HCT VFR BLD AUTO: 26.7 % (ref 35–47)
HGB BLD-MCNC: 7.7 G/DL (ref 11.5–16)
IMM GRANULOCYTES # BLD AUTO: 0.3 K/UL (ref 0–0.04)
IMM GRANULOCYTES NFR BLD AUTO: 2 % (ref 0–0.5)
INR PPP: 1.1 (ref 0.9–1.1)
LYMPHOCYTES # BLD: 2.9 K/UL (ref 0.8–3.5)
LYMPHOCYTES NFR BLD: 20 % (ref 12–49)
MAGNESIUM SERPL-MCNC: 2 MG/DL (ref 1.6–2.4)
MCH RBC QN AUTO: 21.9 PG (ref 26–34)
MCHC RBC AUTO-ENTMCNC: 28.8 G/DL (ref 30–36.5)
MCV RBC AUTO: 75.9 FL (ref 80–99)
MONOCYTES # BLD: 1 K/UL (ref 0–1)
MONOCYTES NFR BLD: 7 % (ref 5–13)
NEUTS SEG # BLD: 10.3 K/UL (ref 1.8–8)
NEUTS SEG NFR BLD: 70 % (ref 32–75)
NRBC # BLD: 0.07 K/UL (ref 0–0.01)
NRBC BLD-RTO: 0.5 PER 100 WBC
PLATELET # BLD AUTO: 248 K/UL (ref 150–400)
PMV BLD AUTO: 10.4 FL (ref 8.9–12.9)
POTASSIUM SERPL-SCNC: 3.5 MMOL/L (ref 3.5–5.1)
PROTHROMBIN TIME: 11.4 SEC (ref 9–11.1)
RBC # BLD AUTO: 3.52 M/UL (ref 3.8–5.2)
RBC MORPH BLD: ABNORMAL
SODIUM SERPL-SCNC: 138 MMOL/L (ref 136–145)
UFH PPP CHRO-ACNC: 0.48 IU/ML
WBC # BLD AUTO: 14.6 K/UL (ref 3.6–11)

## 2024-06-16 PROCEDURE — 85610 PROTHROMBIN TIME: CPT

## 2024-06-16 PROCEDURE — 83735 ASSAY OF MAGNESIUM: CPT

## 2024-06-16 PROCEDURE — 6360000002 HC RX W HCPCS: Performed by: NURSE PRACTITIONER

## 2024-06-16 PROCEDURE — 6370000000 HC RX 637 (ALT 250 FOR IP): Performed by: STUDENT IN AN ORGANIZED HEALTH CARE EDUCATION/TRAINING PROGRAM

## 2024-06-16 PROCEDURE — 94660 CPAP INITIATION&MGMT: CPT

## 2024-06-16 PROCEDURE — 6370000000 HC RX 637 (ALT 250 FOR IP): Performed by: NURSE PRACTITIONER

## 2024-06-16 PROCEDURE — 85025 COMPLETE CBC W/AUTO DIFF WBC: CPT

## 2024-06-16 PROCEDURE — 2580000003 HC RX 258: Performed by: NURSE PRACTITIONER

## 2024-06-16 PROCEDURE — 85520 HEPARIN ASSAY: CPT

## 2024-06-16 PROCEDURE — 71045 X-RAY EXAM CHEST 1 VIEW: CPT

## 2024-06-16 PROCEDURE — 36415 COLL VENOUS BLD VENIPUNCTURE: CPT

## 2024-06-16 PROCEDURE — 2060000000 HC ICU INTERMEDIATE R&B

## 2024-06-16 PROCEDURE — 94640 AIRWAY INHALATION TREATMENT: CPT

## 2024-06-16 PROCEDURE — 6370000000 HC RX 637 (ALT 250 FOR IP): Performed by: INTERNAL MEDICINE

## 2024-06-16 PROCEDURE — 2700000000 HC OXYGEN THERAPY PER DAY

## 2024-06-16 PROCEDURE — 80048 BASIC METABOLIC PNL TOTAL CA: CPT

## 2024-06-16 RX ORDER — WARFARIN SODIUM 5 MG/1
10 TABLET ORAL
Status: COMPLETED | OUTPATIENT
Start: 2024-06-16 | End: 2024-06-16

## 2024-06-16 RX ORDER — METOPROLOL SUCCINATE 25 MG/1
25 TABLET, EXTENDED RELEASE ORAL DAILY
Status: DISCONTINUED | OUTPATIENT
Start: 2024-06-16 | End: 2024-06-21 | Stop reason: HOSPADM

## 2024-06-16 RX ADMIN — HEPARIN SODIUM AND DEXTROSE 16 UNITS/KG/HR: 10000; 5 INJECTION INTRAVENOUS at 22:02

## 2024-06-16 RX ADMIN — SODIUM CHLORIDE 1000 MG: 900 INJECTION INTRAVENOUS at 20:36

## 2024-06-16 RX ADMIN — SODIUM CHLORIDE, PRESERVATIVE FREE 10 ML: 5 INJECTION INTRAVENOUS at 09:26

## 2024-06-16 RX ADMIN — MORPHINE SULFATE 4 MG: 4 INJECTION INTRAVENOUS at 20:43

## 2024-06-16 RX ADMIN — SODIUM CHLORIDE: 9 INJECTION, SOLUTION INTRAVENOUS at 20:34

## 2024-06-16 RX ADMIN — GUAIFENESIN 600 MG: 600 TABLET, EXTENDED RELEASE ORAL at 09:22

## 2024-06-16 RX ADMIN — WARFARIN SODIUM 10 MG: 5 TABLET ORAL at 17:53

## 2024-06-16 RX ADMIN — IPRATROPIUM BROMIDE AND ALBUTEROL SULFATE 1 DOSE: .5; 3 SOLUTION RESPIRATORY (INHALATION) at 07:56

## 2024-06-16 RX ADMIN — SALINE NASAL SPRAY 1 SPRAY: 1.5 SOLUTION NASAL at 20:37

## 2024-06-16 RX ADMIN — IPRATROPIUM BROMIDE AND ALBUTEROL SULFATE 1 DOSE: .5; 3 SOLUTION RESPIRATORY (INHALATION) at 19:56

## 2024-06-16 RX ADMIN — Medication: at 21:58

## 2024-06-16 RX ADMIN — IRON SUCROSE 300 MG: 20 INJECTION, SOLUTION INTRAVENOUS at 17:57

## 2024-06-16 RX ADMIN — AZITHROMYCIN MONOHYDRATE 500 MG: 500 INJECTION, POWDER, LYOPHILIZED, FOR SOLUTION INTRAVENOUS at 21:57

## 2024-06-16 RX ADMIN — METOPROLOL SUCCINATE 25 MG: 25 TABLET, EXTENDED RELEASE ORAL at 09:22

## 2024-06-16 RX ADMIN — SODIUM CHLORIDE, PRESERVATIVE FREE 10 ML: 5 INJECTION INTRAVENOUS at 20:37

## 2024-06-16 RX ADMIN — HEPARIN SODIUM AND DEXTROSE 16 UNITS/KG/HR: 10000; 5 INJECTION INTRAVENOUS at 07:18

## 2024-06-16 RX ADMIN — Medication: at 09:26

## 2024-06-16 RX ADMIN — GUAIFENESIN 600 MG: 600 TABLET, EXTENDED RELEASE ORAL at 20:36

## 2024-06-16 ASSESSMENT — PAIN DESCRIPTION - ORIENTATION: ORIENTATION: MID

## 2024-06-16 ASSESSMENT — PAIN SCALES - GENERAL
PAINLEVEL_OUTOF10: 8
PAINLEVEL_OUTOF10: 3
PAINLEVEL_OUTOF10: 0

## 2024-06-16 ASSESSMENT — PAIN DESCRIPTION - PAIN TYPE: TYPE: ACUTE PAIN

## 2024-06-16 ASSESSMENT — PAIN - FUNCTIONAL ASSESSMENT: PAIN_FUNCTIONAL_ASSESSMENT: ACTIVITIES ARE NOT PREVENTED

## 2024-06-16 ASSESSMENT — PAIN DESCRIPTION - FREQUENCY: FREQUENCY: INTERMITTENT

## 2024-06-16 ASSESSMENT — PAIN DESCRIPTION - LOCATION: LOCATION: RIB CAGE;CHEST

## 2024-06-16 ASSESSMENT — PAIN DESCRIPTION - DESCRIPTORS: DESCRIPTORS: TIGHTNESS

## 2024-06-16 NOTE — PROGRESS NOTES
prior progress note. Most recent are:    /89   Pulse (!) 103   Temp 98.8 °F (37.1 °C) (Axillary)   Resp 20   Ht 1.7 m (5' 6.93\")   Wt 113.9 kg (251 lb 1.7 oz)   SpO2 97%   BMI 39.41 kg/m²     Intake/Output Summary (Last 24 hours) at 6/16/2024 0901  Last data filed at 6/16/2024 0641  Gross per 24 hour   Intake 1161.64 ml   Output 1550 ml   Net -388.36 ml          General Appearance: Well developed, well nourished, alert & oriented x 3,    no acute distress.  Ears/Nose/Mouth/Throat: Hearing grossly normal.  Neck: Supple.  Chest: Lungs clear to auscultation bilaterally.  Cardiovascular: Regular rate and rhythm, S1S2 normal, no murmur.  Abdomen: Soft, non-tender, bowel sounds are active.  Extremities: No edema bilaterally.  Skin: Warm and dry.    PMH/SH reviewed - no change compared to H&P    Data Review    Telemetry: normal sinus rhythm     Lab Data Personally Reviewed:    Recent Labs     06/15/24  0431 06/16/24  0320   WBC 13.3* 14.6*   HGB 7.9* 7.7*   HCT 27.8* 26.7*    248       Recent Labs     06/13/24  2227 06/14/24  0455 06/16/24  0320   INR 1.1 1.1 1.1   APTT 28.8  --   --         Recent Labs     06/14/24  0455 06/15/24  0431 06/16/24  0320    136 138   K 3.4* 3.5 3.5    106 108   CO2 22 24 24   BUN 4* 3* 4*   MG 1.8 2.4 2.0       No results for input(s): \"CPK\" in the last 72 hours.    Invalid input(s): \"CPKMB\", \"CKNDX\", \"TROIQ\"  Lab Results   Component Value Date/Time    CHOL 144 06/14/2024 04:55 AM    HDL 54 06/14/2024 04:55 AM    LDL 71.4 06/14/2024 04:55 AM    LDL 87.6 11/02/2022 11:00 AM       Recent Labs     06/13/24  1759   GLOB 5.5*       No results for input(s): \"PH\", \"PCO2\", \"PO2\" in the last 72 hours.    Medications Personally Reviewed:    Current Facility-Administered Medications   Medication Dose Route Frequency    Warfarin- Pharmacy to dose   Other RX Placeholder    balsum peru-castor oil (VENELEX) ointment   Topical BID    iron sucrose (VENOFER) 300 mg in sodium  SubCUTAneous PRN    dextrose 10 % infusion   IntraVENous Continuous PRN         Adeel Billingsley MD

## 2024-06-16 NOTE — PROGRESS NOTES
Pharmacist Daily Dosing of Warfarin    Indication & Goal INR: PE, INR Goal 2-3    PTA Warfarin Dose: new start    Notable concurrent conditions and medications: azithromycin    Labs:  Recent Labs     Units 06/16/24  0320 06/15/24  0431 06/14/24  0455 06/13/24  2248 06/13/24  2227   INR   1.1  --  1.1  --  1.1   HGB g/dL 7.7* 7.9* 7.9*   < >  --    PLT K/uL 248 234 217   < >  --     < > = values in this interval not displayed.         Impression/Plan:   Will order warfarin 10 mg PO x 1 dose.  Bridging with heparin  Daily INR has been ordered  CBC w/o differential every other day has been ordered     Pharmacy will follow daily and adjust the dose as appropriate.    Thank you,  Khadar Ruiz [facility]      Warfarin Protocol    Located on pharmacy Teams site: Clinical Practice -> Anticoagulation & Cardiology -> Anticoagulation Policies, Protocols, Guidance

## 2024-06-16 NOTE — PROGRESS NOTES
06/16/24 1008 (!) 140/67 -- -- (!) 105 -- 92 %   06/16/24 0922 (!) 142/76 -- -- (!) 116 -- --   06/16/24 0920 -- -- -- (!) 117 -- (!) 89 %   06/16/24 0919 -- -- -- (!) 118 -- (!) 89 %   06/16/24 0918 -- -- -- (!) 118 -- (!) 89 %   06/16/24 0917 -- -- -- (!) 116 -- (!) 89 %   06/16/24 0916 -- -- -- (!) 120 -- (!) 89 %   06/16/24 0915 -- -- -- (!) 114 -- (!) 89 %   06/16/24 0914 -- -- -- (!) 116 -- (!) 89 %   06/16/24 0913 -- -- -- (!) 114 -- (!) 88 %   06/16/24 0912 -- -- -- (!) 115 -- (!) 88 %   06/16/24 0911 -- -- -- (!) 114 -- (!) 89 %   06/16/24 0910 -- -- -- (!) 116 -- (!) 89 %   06/16/24 0909 -- -- -- (!) 114 -- (!) 89 %   06/16/24 0908 -- -- -- (!) 114 -- (!) 88 %   06/16/24 0907 -- -- -- (!) 113 -- (!) 88 %   06/16/24 0906 -- -- -- (!) 114 -- (!) 88 %   06/16/24 0905 -- -- -- (!) 112 -- (!) 87 %   06/16/24 0904 -- -- -- (!) 113 -- (!) 88 %   06/16/24 0903 -- -- -- (!) 112 -- (!) 89 %   06/16/24 0902 -- -- -- (!) 115 -- (!) 83 %   06/16/24 0901 -- -- -- (!) 111 -- (!) 88 %   06/16/24 0900 -- -- -- (!) 110 -- (!) 86 %   06/16/24 0721 (!) 135/90 98.2 °F (36.8 °C) Oral (!) 101 24 90 %   06/16/24 0335 -- -- -- (!) 103 -- 97 %   06/16/24 0330 -- -- -- (!) 110 -- 100 %   06/16/24 0325 134/89 98.8 °F (37.1 °C) Axillary (!) 105 20 97 %   06/16/24 0320 -- -- -- (!) 109 -- 98 %   06/16/24 0315 -- -- -- (!) 107 -- 97 %   06/16/24 0310 -- -- -- (!) 105 -- 97 %   06/16/24 0305 -- -- -- (!) 103 -- 96 %   06/16/24 0300 -- -- -- (!) 104 -- 97 %   06/16/24 0227 -- -- -- (!) 109 (!) 31 96 %   06/15/24 2300 (!) 145/94 98.5 °F (36.9 °C) Axillary (!) 119 (!) 32 96 %   06/15/24 2245 -- -- -- (!) 119 -- 96 %   06/15/24 2230 -- -- -- (!) 119 -- 95 %   06/15/24 2225 -- -- -- (!) 119 (!) 41 95 %   06/15/24 2215 -- -- -- (!) 118 -- (!) 88 %   06/15/24 2200 -- -- -- (!) 117 -- (!) 88 %   06/15/24 2145 -- -- -- (!) 121 -- (!) 87 %   06/15/24 2130 -- -- -- (!) 118 -- (!) 87 %   06/15/24 2045 -- -- -- (!) 123 -- (!) 87 %   06/15/24 9737  -- -- -- (!) 120 -- (!) 88 %   06/15/24 2015 -- -- -- (!) 119 -- 93 %   06/15/24 2014 -- -- -- (!) 120 22 91 %   06/15/24 2000 -- -- -- (!) 116 -- (!) 89 %   06/15/24 1957 -- -- -- (!) 105 20 --   06/15/24 1945 -- -- -- (!) 117 -- (!) 87 %   06/15/24 1930 (!) 144/93 99.1 °F (37.3 °C) Oral (!) 117 26 93 %   06/15/24 1610 (!) 145/92 98.8 °F (37.1 °C) Oral (!) 117 30 93 %         Intake/Output Summary (Last 24 hours) at 6/16/2024 1438  Last data filed at 6/16/2024 1130  Gross per 24 hour   Intake 1161.64 ml   Output 3800 ml   Net -2638.36 ml        I had a face to face encounter and independently examined this patient on 6/16/2024, as outlined below:  PHYSICAL EXAM:  General: Alert, cooperative  EENT:  EOMI. Anicteric sclerae.  Resp:  CTA bilaterally, no wheezing or rales.  No accessory muscle use  CV:  Regular  rhythm,  No edema  GI:  Soft, Non distended, Non tender.  +Bowel sounds  Neurologic:  Alert and oriented X 3, normal speech,   Psych:   Good insight. Not anxious nor agitated  Skin:  No rashes.  No jaundice    Reviewed most current lab test results and cultures  YES  Reviewed most current radiology test results   YES  Review and summation of old records today    NO  Reviewed patient's current orders and MAR    YES  PMH/SH reviewed - no change compared to H&P    Procedures: see electronic medical records for all procedures/Xrays and details which were not copied into this note but were reviewed prior to creation of Plan.      LABS:  I reviewed today's most current labs and imaging studies.  Pertinent labs include:  Recent Labs     06/14/24  0455 06/15/24  0431 06/16/24  0320   WBC 15.5* 13.3* 14.6*   HGB 7.9* 7.9* 7.7*   HCT 27.3* 27.8* 26.7*    234 248     Recent Labs     06/13/24  1759 06/13/24  2227 06/14/24  0455 06/15/24  0431 06/16/24  0320   *  --  140 136 138   K 3.8  --  3.4* 3.5 3.5     --  106 106 108   CO2 24  --  22 24 24   GLUCOSE 122*  --  96 98 96   BUN 7  --  4* 3* 4*

## 2024-06-16 NOTE — PROGRESS NOTES
End of Shift Note    Bedside shift change report given to Yasmin (oncoming nurse) by DONALD LILLY and Zachary Christiansen RN (offgoing nurse).  Report included the following information SBAR, Intake/Output, MAR, Recent Results, Cardiac Rhythm  , Alarm Parameters , and Quality Measures    Shift worked:  3524-8595     Shift summary and any significant changes:     Currently calm,saturating well,urine looked abit concentrated,patient was advised to rehydrate.still on heparin infusion which remains to be therapeutic,to be stopped later today     Concerns for physician to address:       Zone phone for oncoming shift:          Activity:     Number times ambulated in hallways past shift: 0  Number of times OOB to chair past shift: 0    Cardiac:   Cardiac Monitoring: Yes           Access:  Current line(s): PIV     Genitourinary:   Urinary status: voiding and external catheter    Respiratory:      Chronic home O2 use?: YES  Incentive spirometer at bedside: YES       GI:     Current diet:  ADULT DIET; Regular  Passing flatus: YES  Tolerating current diet: YES       Pain Management:   Patient states pain is manageable on current regimen: YES    Skin:     Interventions: PT/OT consult and limit briefs    Patient Safety:  Fall Score:    Interventions: bed/chair alarm and pt to call before getting OOB       Length of Stay:  Expected LOS: 3  Actual LOS: 3      Zachary Christiansen, RN

## 2024-06-17 LAB
ERYTHROCYTE [DISTWIDTH] IN BLOOD BY AUTOMATED COUNT: 19.2 % (ref 11.5–14.5)
HCT VFR BLD AUTO: 27.5 % (ref 35–47)
HGB BLD-MCNC: 7.9 G/DL (ref 11.5–16)
INR PPP: 1.1 (ref 0.9–1.1)
L PNEUMO1 AG UR QL IA: NEGATIVE
M PNEUMO IGG SER IA-ACNC: 206 U/ML (ref 0–99)
M PNEUMO IGM SER IA-ACNC: <770 U/ML (ref 0–769)
MCH RBC QN AUTO: 22.2 PG (ref 26–34)
MCHC RBC AUTO-ENTMCNC: 28.7 G/DL (ref 30–36.5)
MCV RBC AUTO: 77.2 FL (ref 80–99)
NRBC # BLD: 0.17 K/UL (ref 0–0.01)
NRBC BLD-RTO: 1.2 PER 100 WBC
PLATELET # BLD AUTO: 270 K/UL (ref 150–400)
PMV BLD AUTO: 10.1 FL (ref 8.9–12.9)
PROTHROMBIN TIME: 11.6 SEC (ref 9–11.1)
RBC # BLD AUTO: 3.56 M/UL (ref 3.8–5.2)
SPECIMEN SOURCE: NORMAL
UFH PPP CHRO-ACNC: 0.59 IU/ML
WBC # BLD AUTO: 14.3 K/UL (ref 3.6–11)

## 2024-06-17 PROCEDURE — 2580000003 HC RX 258: Performed by: NURSE PRACTITIONER

## 2024-06-17 PROCEDURE — 36415 COLL VENOUS BLD VENIPUNCTURE: CPT

## 2024-06-17 PROCEDURE — 85520 HEPARIN ASSAY: CPT

## 2024-06-17 PROCEDURE — 97535 SELF CARE MNGMENT TRAINING: CPT

## 2024-06-17 PROCEDURE — 2060000000 HC ICU INTERMEDIATE R&B

## 2024-06-17 PROCEDURE — 6360000002 HC RX W HCPCS: Performed by: NURSE PRACTITIONER

## 2024-06-17 PROCEDURE — 6370000000 HC RX 637 (ALT 250 FOR IP)

## 2024-06-17 PROCEDURE — 6370000000 HC RX 637 (ALT 250 FOR IP): Performed by: NURSE PRACTITIONER

## 2024-06-17 PROCEDURE — 85610 PROTHROMBIN TIME: CPT

## 2024-06-17 PROCEDURE — 6370000000 HC RX 637 (ALT 250 FOR IP): Performed by: INTERNAL MEDICINE

## 2024-06-17 PROCEDURE — 85027 COMPLETE CBC AUTOMATED: CPT

## 2024-06-17 PROCEDURE — 94660 CPAP INITIATION&MGMT: CPT

## 2024-06-17 PROCEDURE — 2700000000 HC OXYGEN THERAPY PER DAY

## 2024-06-17 PROCEDURE — 94640 AIRWAY INHALATION TREATMENT: CPT

## 2024-06-17 PROCEDURE — 97530 THERAPEUTIC ACTIVITIES: CPT

## 2024-06-17 RX ORDER — AZITHROMYCIN 250 MG/1
500 TABLET, FILM COATED ORAL DAILY
Status: COMPLETED | OUTPATIENT
Start: 2024-06-17 | End: 2024-06-19

## 2024-06-17 RX ORDER — GUAIFENESIN 600 MG/1
TABLET, EXTENDED RELEASE ORAL
Status: COMPLETED
Start: 2024-06-17 | End: 2024-06-17

## 2024-06-17 RX ORDER — WARFARIN SODIUM 5 MG/1
10 TABLET ORAL
Status: COMPLETED | OUTPATIENT
Start: 2024-06-17 | End: 2024-06-17

## 2024-06-17 RX ADMIN — SODIUM CHLORIDE, PRESERVATIVE FREE 10 ML: 5 INJECTION INTRAVENOUS at 20:39

## 2024-06-17 RX ADMIN — GUAIFENESIN 600 MG: 600 TABLET, EXTENDED RELEASE ORAL at 20:38

## 2024-06-17 RX ADMIN — METOPROLOL SUCCINATE 25 MG: 25 TABLET, EXTENDED RELEASE ORAL at 08:26

## 2024-06-17 RX ADMIN — Medication: at 20:40

## 2024-06-17 RX ADMIN — WARFARIN SODIUM 10 MG: 5 TABLET ORAL at 18:21

## 2024-06-17 RX ADMIN — IPRATROPIUM BROMIDE AND ALBUTEROL SULFATE 1 DOSE: .5; 3 SOLUTION RESPIRATORY (INHALATION) at 19:23

## 2024-06-17 RX ADMIN — GUAIFENESIN 600 MG: 600 TABLET, EXTENDED RELEASE ORAL at 08:26

## 2024-06-17 RX ADMIN — IPRATROPIUM BROMIDE AND ALBUTEROL SULFATE 1 DOSE: .5; 3 SOLUTION RESPIRATORY (INHALATION) at 07:24

## 2024-06-17 RX ADMIN — HEPARIN SODIUM AND DEXTROSE 16 UNITS/KG/HR: 10000; 5 INJECTION INTRAVENOUS at 12:00

## 2024-06-17 RX ADMIN — AZITHROMYCIN 500 MG: 250 TABLET, FILM COATED ORAL at 20:38

## 2024-06-17 RX ADMIN — SODIUM CHLORIDE, PRESERVATIVE FREE 10 ML: 5 INJECTION INTRAVENOUS at 10:01

## 2024-06-17 RX ADMIN — Medication: at 09:00

## 2024-06-17 RX ADMIN — SALINE NASAL SPRAY 1 SPRAY: 1.5 SOLUTION NASAL at 20:39

## 2024-06-17 NOTE — PROGRESS NOTES
Cardiology Progress Note      6/17/2024 6:24 PM    Admit Date: 6/13/2024          Subjective: Course over weekend noted . Still awaiting echo          /88   Pulse 91   Temp 98.1 °F (36.7 °C) (Oral)   Resp 20   Ht 1.7 m (5' 6.93\")   Wt 116.6 kg (257 lb 1.6 oz)   SpO2 95%   BMI 40.35 kg/m²   06/15 1901 - 06/17 0700  In: 2729.6 [P.O.:600; I.V.:1355]  Out: 4100 [Urine:4100]        Objective:      Physical Exam:  VS as above  Chest CTA  Card RRR no gallop     Data Review:   Labs:    INR 1.1  Hgb 7.9     Telemetry: SR       Assessment:     1. Mildly elevated troponin of uncertain cause, probable recurrent pulmonary emboli versus acute coronary syndrome.  2. History of chronic pulmonary emboli as demonstrated by CT.  3. Hypertension.  4. Sleep apnea, on CPAP.  5. Obesity.  6. Anemia.  7. Remote history of cervical cancer with prior surgery.     Plan:  Echo hopefully tomm. Otherwise cont current Rx with IV heparin until INR 2-3

## 2024-06-17 NOTE — CARE COORDINATION
RUR: 14%  Readmission? No  IM? N/A - pending Medicaid    Transition of Care Plan: Home with outpatient services    Prior Level of Functioning: Independent  Disposition: Home with outpatient services  If SNF or IPR: Date FOC offered: N/A  Follow up appointments: PCP and specialists as recommended  DME needed: TBD pending PT/OT recommendations  Transportation at discharge: Boyfriend  IM/IMM Medicare/ letter given: N/A - patient has pending Medicaid   Is patient a [place] and connected with [facility]? N/A   If yes, was  transfer form completed and [facility] notified? N/A  Caregiver Contact: Kelvindonald Sarmiento,(Boyfriend) [phone]   Discharge Caregiver contacted prior to discharge? CM to contact if needed  Care Conference needed? Not at this time  Barriers to discharge:  Medical clearance     CM discussed pt's discharge plan with medical team in IDR at 10:00 AM. Pt's anticipated discharge date is 6/19 pending ECHO and clinical improvement. Pt has pending Medicaid as payor source. PT/OT following for recommendations, but options may be limited due to pending Medicaid. Case management to continue to follow for discharge planning.    Ratna Pierre LMSW,   [phone]

## 2024-06-17 NOTE — PLAN OF CARE
to facilitate oxygenation and minimize respiratory effort   Encourage broncho-pulmonary hygiene including cough, deep breathe, incentive spirometry   Assess the need for suctioning and aspirate as needed   Assess and instruct to report shortness of breath or any respiratory difficulty   Respiratory therapy support as indicated  6/16/2024 2054 by Araceli Camacho RN  Outcome: Progressing  6/16/2024 1955 by Madison Ravi, RT  Outcome: Progressing     Problem: Safety - Adult  Goal: Free from fall injury  6/17/2024 0634 by Devora Escobedo RN  Outcome: Progressing  6/16/2024 2054 by Araceli Camacho RN  Outcome: Progressing     Problem: Hematologic - Adult  Goal: Maintains hematologic stability  Outcome: Progressing  Flowsheets (Taken 6/17/2024 0633)  Maintains hematologic stability:   Monitor labs for bleeding or clotting disorders   Assess for signs and symptoms of bleeding or hemorrhage   Administer blood products/factors as ordered     Problem: Cardiovascular - Adult  Goal: Maintains optimal cardiac output and hemodynamic stability  Outcome: Progressing  Flowsheets (Taken 6/17/2024 0633)  Maintains optimal cardiac output and hemodynamic stability:   Monitor blood pressure and heart rate   Monitor urine output and notify Licensed Independent Practitioner for values outside of normal range   Assess for signs of decreased cardiac output   Administer fluid and/or volume expanders as ordered  Goal: Absence of cardiac dysrhythmias or at baseline  Outcome: Progressing  Flowsheets (Taken 6/17/2024 0633)  Absence of cardiac dysrhythmias or at baseline:   Monitor cardiac rate and rhythm   Assess for signs of decreased cardiac output   Administer antiarrhythmia medication and electrolyte replacement as ordered     Problem: Gastrointestinal - Adult  Goal: Minimal or absence of nausea and vomiting  Outcome: Progressing  Flowsheets (Taken 6/17/2024 0633)  Minimal or absence of nausea and vomiting: Provide nonpharmacologic  comfort measures as appropriate  Goal: Maintains adequate nutritional intake  Outcome: Progressing  Flowsheets (Taken 6/17/2024 0633)  Maintains adequate nutritional intake:   Monitor percentage of each meal consumed   Identify factors contributing to decreased intake, treat as appropriate   Assist with meals as needed   Monitor intake and output, weight and lab values     Problem: Skin/Tissue Integrity - Adult  Goal: Skin integrity remains intact  Outcome: Progressing  Flowsheets (Taken 6/17/2024 0633)  Skin Integrity Remains Intact:   Monitor for areas of redness and/or skin breakdown   Assess vascular access sites hourly   Every 4-6 hours minimum: Change oxygen saturation probe site   Every 4-6 hours: If on nasal continuous positive airway pressure, respiratory therapy assesses nares and determine need for appliance change or resting period  Goal: Incisions, wounds, or drain sites healing without S/S of infection  Outcome: Progressing  Flowsheets (Taken 6/17/2024 0633)  Incisions, Wounds, or Drain Sites Healing Without Sign and Symptoms of Infection:   ADMISSION and DAILY: Assess and document risk factors for pressure ulcer development   TWICE DAILY: Assess and document skin integrity   TWICE DAILY: Assess and document dressing/incision, wound bed, drain sites and surrounding tissue   Implement wound care per orders   Initiate pressure ulcer prevention bundle as indicated

## 2024-06-17 NOTE — PROGRESS NOTES
End of Shift Note    Bedside shift change report given to DONALD Lozoya (oncoming nurse) by Sully Gallagher RN (offgoing nurse).  Report included the following information SBAR, Intake/Output, and Cardiac Rhythm Normal Sinus    Shift worked:  [id number]     Shift summary and any significant changes:     Very good output today, up to BSC for BM.     Echo re-ordered per cardiology    Incentive spirometer started today, pt is working hard at it!      Concerns for physician to address:  Warfarin started, heparin gtt off tomorrow??     Zone phone for oncoming shift:          Activity:     Number times ambulated in hallways past shift: 0  Number of times OOB to chair past shift: 0    Cardiac:   Cardiac Monitoring: Yes           Access:  Current line(s): PIV     Genitourinary:   Urinary status: voiding and external catheter    Respiratory:      Chronic home O2 use?: NO  Incentive spirometer at bedside: YES       GI:     Current diet:  ADULT DIET; Regular  Passing flatus: YES  Tolerating current diet: YES       Pain Management:   Patient states pain is manageable on current regimen: YES    Skin:     Interventions: increase time out of bed and PT/OT consult    Patient Safety:  Fall Score:    Interventions: gripper socks and pt to call before getting OOB       Length of Stay:  Expected LOS: 6  Actual LOS: 4      Sully Gallagher RN

## 2024-06-17 NOTE — PLAN OF CARE
Problem: Pain  Goal: Verbalizes/displays adequate comfort level or baseline comfort level  Outcome: Progressing     Problem: ABCDS Injury Assessment  Goal: Absence of physical injury  Outcome: Progressing     Problem: Respiratory - Adult  Goal: Achieves optimal ventilation and oxygenation  6/16/2024 2054 by Araceli Camacho, RN  Outcome: Progressing  6/16/2024 1955 by Madison Ravi RT  Outcome: Progressing  6/16/2024 1502 by Sujata Jones, RT  Outcome: Progressing     Problem: Safety - Adult  Goal: Free from fall injury  Outcome: Progressing

## 2024-06-17 NOTE — PLAN OF CARE
Problem: Physical Therapy - Adult  Goal: By Discharge: Performs mobility at highest level of function for planned discharge setting.  See evaluation for individualized goals.  Description: FUNCTIONAL STATUS PRIOR TO ADMISSION: Patient was independent and active without use of DME.    HOME SUPPORT PRIOR TO ADMISSION: The patient lived with significant other and 3 children.    Physical Therapy Goals  Initiated 6/14/2024  1.  Patient will move from supine to sit and sit to supine  and scoot up and down in bed with modified independence within 7 day(s).    2.  Patient will transfer from bed to chair and chair to bed with modified independence using the least restrictive device within 7 day(s).  3.  Patient will perform sit to stand with modified independence within 7 day(s).  4.  Patient will ambulate with modified independence for 100 feet with the least restrictive device within 7 day(s).   5.  Patient will ascend/descend 15 stairs with one handrail(s) with supervision/set-up within 7 day(s).    Outcome: Progressing   PHYSICAL THERAPY TREATMENT    Patient: Diana Snell (33 y.o. female)  Date: 6/17/2024  Diagnosis: Acute and chr resp failure, unsp w hypoxia or hypercapnia (HCC) [J96.20]  Pneumonia due to infectious organism, unspecified laterality, unspecified part of lung [J18.9]  Acute pulmonary embolism without acute cor pulmonale, unspecified pulmonary embolism type (HCC) [I26.99] Acute pulmonary embolism without acute cor pulmonale, unspecified pulmonary embolism type (HCC)      Precautions: Fall Risk                      ASSESSMENT:  Patient continues to benefit from skilled PT services and is progressing towards goals. Pt received semi fowlers in bed, agreeable to participate in therapy. Received on 4L O2, saturating high 90s throughout mobility. Performed multiple transfers to bedside commode with no issues. Difficult to predict discharge planning at this time as pt is unable to participate in ambulation

## 2024-06-17 NOTE — PROGRESS NOTES
Pharmacist Daily Dosing of Warfarin    Indication & Goal INR: PE, INR Goal 2-3    PTA Warfarin Dose: new start    Notable concurrent conditions and medications: azithromycin    Labs:  Recent Labs     Units 06/17/24  0353 06/16/24  0320 06/15/24  0431   INR   1.1 1.1  --    HGB g/dL 7.9* 7.7* 7.9*   PLT K/uL 270 248 234         Impression/Plan:   Warfarin 10 mg x 1 tonight. Will consider increasing dose if INR remains subtherapeutic   Bridging with heparin  Daily INR has been ordered  CBC w/o differential every other day has been ordered     Pharmacy will follow daily and adjust the dose as appropriate.    Thank you,  Cash Grimm [facility]      Warfarin Protocol    Located on pharmacy Teams site: Clinical Practice -> Anticoagulation & Cardiology -> Anticoagulation Policies, Protocols, Guidance

## 2024-06-17 NOTE — PLAN OF CARE
Problem: Occupational Therapy - Adult  Goal: By Discharge: Performs self-care activities at highest level of function for planned discharge setting.  See evaluation for individualized goals.  Description: FUNCTIONAL STATUS PRIOR TO ADMISSION:  pt w/ hx of chronic PE since 2018 and within last ~2 weeks has stated increased general weakness and RLQ stomach pain; pt states independence with ADLs and ambulation, but her partner has been assisting more with meal prep and IADL completion =  Receives Help From: Family, ADL Assistance: Independent,  ,  ,  ,  ,  ,  , Ambulation Assistance: Independent, Transfer Assistance: Independent, Active : Yes     HOME SUPPORT: Patient lived w/ significant other.    Occupational Therapy Goals:  Initiated 6/14/2024  1.  Patient will perform grooming while standing at sink with Contact Guard Assist within 7 day(s).  2.  Patient will perform upper body dressing with Supervision within 7 day(s).  3.  Patient will perform lower body dressing with Minimal Assist within 7 day(s).  4.  Patient will perform toilet transfers with Contact Guard Assist  within 7 day(s).  5.  Patient will perform all aspects of toileting with Contact Guard Assist within 7 day(s).  6.  Patient will participate in upper extremity therapeutic exercise/activities with Stand by Assist for 5 minutes within 7 day(s).    7.  Patient will utilize energy conservation techniques during functional activities with verbal cues within 7 day(s).   Outcome: Progressing    OCCUPATIONAL THERAPY TREATMENT  Patient: Diana Snell (33 y.o. female)  Date: 6/17/2024  Primary Diagnosis: Acute and chr resp failure, unsp w hypoxia or hypercapnia (HCC) [J96.20]  Pneumonia due to infectious organism, unspecified laterality, unspecified part of lung [J18.9]  Acute pulmonary embolism without acute cor pulmonale, unspecified pulmonary embolism type (HCC) [I26.99]       Precautions: Fall Risk                Chart, occupational therapy

## 2024-06-17 NOTE — PROGRESS NOTES
Hospitalist Progress Note    NAME:   Diana Snell   : 1990   MRN: [id number]     Date/Time: 2024 1:48 PM  Patient PCP: Sonya Abreu MD    Estimated discharge date:   Barriers: weaning oxygen, on heparin drip.  Echocardiogram    Assessment / Plan:  Acute hypoxic respiratory failure  Acute pulmonary embolism    ECHO pending  cxr suspected new rt airspace disease/pna vs pulmonary infarct  cta chest + subacute to chronic pulmonary emboli in upper and lower lobes  Ct a/p neg acute infectious processes or findings, has IUD in uterus as expected w/o complications  Covid and flu negative  Pulmonary and hematology consulted  Per hematology, hypercoagulable workup was negative and they recommended warfarin due to cost issues  Patient was not taking Xarelto due to issues with cost    -Oh continue heparin drip plus warfarin.  INR is 1.1.  Check INR daily.  Pharmacy managing.  Echocardiogram still pending.  She is currently on 4 L nasal cannula and is being weaned off.  Seen by PT OT and recommended IPR placement.  Case management consulted    Troponin elevation due to pulmonary embolism.  -Noted recommendations from cardiology.  Continue heparin drip infarction.  Continue added metoprolol.  Follow results of echocardiogram     Mild acute hyponatremia, asymptomatic  -Sodium is improved and is 138     Mild acute hyperglycemia w/o known h/o T2DM  Hemoglobin A1c 5.5  She does not have diabetes, sliding scale stopped    Chronic anemia, hgb stable, w/o acute active bleeding, states known menorrhagia  Iron profile suggestive of STELLA  Continue added Venofer     Medical Decision Making:   I personally reviewed labs: CBC due to being on heparin drip  I personally reviewed imaging: Chest x-ray  I personally reviewed EKG:  Toxic drug monitoring: Monitor hemoglobin while on heparin drip  Discussed case with: Pulmonary, pharmacy regarding heparin drip    Code Status: Full  DVT Prophylaxis: Heparin drip  GI

## 2024-06-17 NOTE — PROGRESS NOTES
PULMONARY:    Chart reviewed  VSS afebrile  Sats 94-95%  Continue current care  Please follow pulmonary recommendations  Will need OAC lifelong  Nothing more to add  Will sign off  Eric Valenzuela MD

## 2024-06-17 NOTE — PROGRESS NOTES
End of Shift Note    Bedside shift change report given to DONALD Lozoya (oncoming nurse) by Manuel Camacho RN (offgoing nurse).  Report included the following information SBAR    Shift worked:  7a-7p     Shift summary and any significant changes:     Patient experiencing shortness of breath and desaturation with increased oxygen needs. STAT Chest xray done but results remained pending at end of shift.     Concerns for physician to address:       Zone phone for oncoming shift:   [phone]       Activity:     Number times ambulated in hallways past shift: 0  Number of times OOB to chair past shift: 0    Cardiac:   Cardiac Monitoring: Yes           Access:  Current line(s): PIV     Genitourinary:   Urinary status: voiding and external catheter    Respiratory:      Chronic home O2 use?: NO  Incentive spirometer at bedside: YES       GI:     Current diet:  ADULT DIET; Regular  Passing flatus: YES  Tolerating current diet: YES       Pain Management:   Patient states pain is manageable on current regimen: YES    Skin:     Interventions: increase time out of bed    Patient Safety:  Fall Score:    Interventions: bed/chair alarm, gripper socks, pt to call before getting OOB, and stay with me (per policy)       Length of Stay:  Expected LOS: 3  Actual LOS: 3      Manuel Camacho RN

## 2024-06-17 NOTE — PROGRESS NOTES
End of Shift Note    Bedside shift change report given to Sully BENNETT (oncoming nurse) by Devora Escobedo RN (offgoing nurse).  Report included the following information SBAR, Intake/Output, MAR, Recent Results, and Cardiac Rhythm NSR to sinus tach    Shift worked:  7p-7a     Shift summary and any significant changes:    Morphine given x1 per PRN orders for pleuritic chest pain p neb treatment.    CPAP worn while sleeping.     5L NC while awake at rest, 93% SpO2.    Heparin gtt remains therapeutic    Pending ECHO     Concerns for physician to address:       Zone phone for oncoming shift:   1148       Activity:     Number times ambulated in hallways past shift: 0  Number of times OOB to chair past shift: 0    Cardiac:   Cardiac Monitoring: Yes           Access:  Current line(s): PIV     Genitourinary:   Urinary status: voiding and external catheter    Respiratory:      Chronic home O2 use?: NO  Incentive spirometer at bedside: YES       GI:     Current diet:  ADULT DIET; Regular  Passing flatus: YES  Tolerating current diet: YES       Pain Management:   Patient states pain is manageable on current regimen: YES    Skin:     Interventions: float heels, increase time out of bed, PT/OT consult, and internal/external urinary devices    Patient Safety:  Fall Score:    Interventions: bed/chair alarm, gripper socks, pt to call before getting OOB, and stay with me (per policy)       Length of Stay:  Expected LOS: 3  Actual LOS: 4      Devora Escobedo RN

## 2024-06-18 ENCOUNTER — APPOINTMENT (OUTPATIENT)
Facility: HOSPITAL | Age: 34
End: 2024-06-18
Attending: INTERNAL MEDICINE
Payer: MEDICAID

## 2024-06-18 LAB
ANION GAP SERPL CALC-SCNC: 4 MMOL/L (ref 5–15)
BASOPHILS # BLD: 0 K/UL (ref 0–0.1)
BASOPHILS NFR BLD: 0 % (ref 0–1)
BUN SERPL-MCNC: 5 MG/DL (ref 6–20)
BUN/CREAT SERPL: 9 (ref 12–20)
CALCIUM SERPL-MCNC: 9.2 MG/DL (ref 8.5–10.1)
CHLORIDE SERPL-SCNC: 105 MMOL/L (ref 97–108)
CO2 SERPL-SCNC: 26 MMOL/L (ref 21–32)
CREAT SERPL-MCNC: 0.57 MG/DL (ref 0.55–1.02)
DIFFERENTIAL METHOD BLD: ABNORMAL
ECHO AO ROOT DIAM: 3 CM
ECHO AO ROOT INDEX: 1.33 CM/M2
ECHO AV AREA PEAK VELOCITY: 3 CM2
ECHO AV AREA PEAK VELOCITY: 3.1 CM2
ECHO AV AREA VTI: 2.7 CM2
ECHO AV AREA/BSA VTI: 1.2 CM2/M2
ECHO AV CUSP MM: 2 CM
ECHO AV MEAN GRADIENT: 6 MMHG
ECHO AV MEAN VELOCITY: 1.1 M/S
ECHO AV PEAK GRADIENT: 10 MMHG
ECHO AV PEAK GRADIENT: 10 MMHG
ECHO AV PEAK VELOCITY: 1.6 M/S
ECHO AV PEAK VELOCITY: 1.6 M/S
ECHO AV VTI: 27.3 CM
ECHO BSA: 2.36 M2
ECHO EST RA PRESSURE: 10 MMHG
ECHO LA VOL A-L A2C: 39 ML (ref 22–52)
ECHO LA VOL A-L A4C: 108 ML (ref 22–52)
ECHO LA VOL MOD A2C: 37 ML (ref 22–52)
ECHO LA VOL MOD A4C: 99 ML (ref 22–52)
ECHO LA VOLUME AREA LENGTH: 66 ML
ECHO LA VOLUME INDEX A-L A2C: 17 ML/M2 (ref 16–34)
ECHO LA VOLUME INDEX A-L A4C: 48 ML/M2 (ref 16–34)
ECHO LA VOLUME INDEX AREA LENGTH: 29 ML/M2 (ref 16–34)
ECHO LA VOLUME INDEX MOD A2C: 16 ML/M2 (ref 16–34)
ECHO LA VOLUME INDEX MOD A4C: 44 ML/M2 (ref 16–34)
ECHO LV E' LATERAL VELOCITY: 7 CM/S
ECHO LV E' SEPTAL VELOCITY: 9 CM/S
ECHO LVOT AREA: 3.8 CM2
ECHO LVOT AV VTI INDEX: 0.72
ECHO LVOT DIAM: 2.2 CM
ECHO LVOT MEAN GRADIENT: 3 MMHG
ECHO LVOT PEAK GRADIENT: 7 MMHG
ECHO LVOT PEAK GRADIENT: 7 MMHG
ECHO LVOT PEAK VELOCITY: 1.3 M/S
ECHO LVOT PEAK VELOCITY: 1.3 M/S
ECHO LVOT STROKE VOLUME INDEX: 33 ML/M2
ECHO LVOT SV: 74.5 ML
ECHO LVOT VTI: 19.6 CM
ECHO MV A VELOCITY: 0.52 M/S
ECHO MV AREA VTI: 3.7 CM2
ECHO MV E DECELERATION TIME (DT): 134 MS
ECHO MV E VELOCITY: 0.85 M/S
ECHO MV E/A RATIO: 1.63
ECHO MV E/E' LATERAL: 12.14
ECHO MV E/E' RATIO (AVERAGED): 10.79
ECHO MV E/E' SEPTAL: 9.44
ECHO MV LVOT VTI INDEX: 1.03
ECHO MV MAX VELOCITY: 0.9 M/S
ECHO MV MEAN GRADIENT: 2 MMHG
ECHO MV MEAN VELOCITY: 0.6 M/S
ECHO MV PEAK GRADIENT: 3 MMHG
ECHO MV VTI: 20.1 CM
ECHO PULMONARY ARTERY END DIASTOLIC PRESSURE: 7 MMHG
ECHO PV MAX VELOCITY: 0.8 M/S
ECHO PV PEAK GRADIENT: 2 MMHG
ECHO PV REGURGITANT MAX VELOCITY: 1.3 M/S
ECHO RIGHT VENTRICULAR SYSTOLIC PRESSURE (RVSP): 35 MMHG
ECHO RV FREE WALL PEAK S': 16 CM/S
ECHO RV INTERNAL DIMENSION: 5.4 CM
ECHO TV REGURGITANT MAX VELOCITY: 2.5 M/S
ECHO TV REGURGITANT PEAK GRADIENT: 25 MMHG
EKG ATRIAL RATE: 120 BPM
EKG ATRIAL RATE: 138 BPM
EKG DIAGNOSIS: NORMAL
EKG DIAGNOSIS: NORMAL
EKG P AXIS: 37 DEGREES
EKG P AXIS: 53 DEGREES
EKG P-R INTERVAL: 144 MS
EKG P-R INTERVAL: 152 MS
EKG Q-T INTERVAL: 284 MS
EKG Q-T INTERVAL: 330 MS
EKG QRS DURATION: 72 MS
EKG QRS DURATION: 72 MS
EKG QTC CALCULATION (BAZETT): 430 MS
EKG QTC CALCULATION (BAZETT): 466 MS
EKG R AXIS: 34 DEGREES
EKG R AXIS: 74 DEGREES
EKG T AXIS: -17 DEGREES
EKG T AXIS: 7 DEGREES
EKG VENTRICULAR RATE: 120 BPM
EKG VENTRICULAR RATE: 138 BPM
EOSINOPHIL # BLD: 0.4 K/UL (ref 0–0.4)
EOSINOPHIL NFR BLD: 3 % (ref 0–7)
ERYTHROCYTE [DISTWIDTH] IN BLOOD BY AUTOMATED COUNT: 20.3 % (ref 11.5–14.5)
GLUCOSE SERPL-MCNC: 104 MG/DL (ref 65–100)
HCT VFR BLD AUTO: 27.5 % (ref 35–47)
HGB BLD-MCNC: 7.7 G/DL (ref 11.5–16)
IMM GRANULOCYTES # BLD AUTO: 0 K/UL (ref 0–0.04)
IMM GRANULOCYTES NFR BLD AUTO: 0 % (ref 0–0.5)
INR PPP: 1.3 (ref 0.9–1.1)
LYMPHOCYTES # BLD: 2.4 K/UL (ref 0.8–3.5)
LYMPHOCYTES NFR BLD: 17 % (ref 12–49)
MCH RBC QN AUTO: 21.8 PG (ref 26–34)
MCHC RBC AUTO-ENTMCNC: 28 G/DL (ref 30–36.5)
MCV RBC AUTO: 77.9 FL (ref 80–99)
METAMYELOCYTES NFR BLD MANUAL: 2 %
MONOCYTES # BLD: 1 K/UL (ref 0–1)
MONOCYTES NFR BLD: 7 % (ref 5–13)
NEUTS SEG # BLD: 10 K/UL (ref 1.8–8)
NEUTS SEG NFR BLD: 71 % (ref 32–75)
NRBC # BLD: 0.2 K/UL (ref 0–0.01)
NRBC BLD-RTO: 1.4 PER 100 WBC
PLATELET # BLD AUTO: 277 K/UL (ref 150–400)
PMV BLD AUTO: 9.8 FL (ref 8.9–12.9)
POTASSIUM SERPL-SCNC: 3.7 MMOL/L (ref 3.5–5.1)
PROTHROMBIN TIME: 13.3 SEC (ref 9–11.1)
RBC # BLD AUTO: 3.53 M/UL (ref 3.8–5.2)
RBC MORPH BLD: ABNORMAL
SODIUM SERPL-SCNC: 135 MMOL/L (ref 136–145)
UFH PPP CHRO-ACNC: 0.45 IU/ML
WBC # BLD AUTO: 14.1 K/UL (ref 3.6–11)

## 2024-06-18 PROCEDURE — 94660 CPAP INITIATION&MGMT: CPT

## 2024-06-18 PROCEDURE — 6370000000 HC RX 637 (ALT 250 FOR IP): Performed by: NURSE PRACTITIONER

## 2024-06-18 PROCEDURE — 94640 AIRWAY INHALATION TREATMENT: CPT

## 2024-06-18 PROCEDURE — 85025 COMPLETE CBC W/AUTO DIFF WBC: CPT

## 2024-06-18 PROCEDURE — 93306 TTE W/DOPPLER COMPLETE: CPT

## 2024-06-18 PROCEDURE — 6370000000 HC RX 637 (ALT 250 FOR IP): Performed by: INTERNAL MEDICINE

## 2024-06-18 PROCEDURE — 80048 BASIC METABOLIC PNL TOTAL CA: CPT

## 2024-06-18 PROCEDURE — 85610 PROTHROMBIN TIME: CPT

## 2024-06-18 PROCEDURE — 2060000000 HC ICU INTERMEDIATE R&B

## 2024-06-18 PROCEDURE — 85520 HEPARIN ASSAY: CPT

## 2024-06-18 PROCEDURE — 6360000002 HC RX W HCPCS: Performed by: NURSE PRACTITIONER

## 2024-06-18 PROCEDURE — 2580000003 HC RX 258: Performed by: NURSE PRACTITIONER

## 2024-06-18 PROCEDURE — 36415 COLL VENOUS BLD VENIPUNCTURE: CPT

## 2024-06-18 PROCEDURE — 2700000000 HC OXYGEN THERAPY PER DAY

## 2024-06-18 RX ORDER — WARFARIN SODIUM 5 MG/1
15 TABLET ORAL
Status: COMPLETED | OUTPATIENT
Start: 2024-06-18 | End: 2024-06-18

## 2024-06-18 RX ADMIN — Medication: at 21:03

## 2024-06-18 RX ADMIN — SODIUM CHLORIDE, PRESERVATIVE FREE 10 ML: 5 INJECTION INTRAVENOUS at 21:02

## 2024-06-18 RX ADMIN — IPRATROPIUM BROMIDE AND ALBUTEROL SULFATE 1 DOSE: .5; 3 SOLUTION RESPIRATORY (INHALATION) at 19:55

## 2024-06-18 RX ADMIN — Medication: at 10:07

## 2024-06-18 RX ADMIN — HEPARIN SODIUM AND DEXTROSE 16 UNITS/KG/HR: 10000; 5 INJECTION INTRAVENOUS at 14:36

## 2024-06-18 RX ADMIN — SODIUM CHLORIDE, PRESERVATIVE FREE 10 ML: 5 INJECTION INTRAVENOUS at 10:06

## 2024-06-18 RX ADMIN — METOPROLOL SUCCINATE 25 MG: 25 TABLET, EXTENDED RELEASE ORAL at 10:06

## 2024-06-18 RX ADMIN — GUAIFENESIN 600 MG: 600 TABLET, EXTENDED RELEASE ORAL at 21:02

## 2024-06-18 RX ADMIN — IPRATROPIUM BROMIDE AND ALBUTEROL SULFATE 1 DOSE: .5; 3 SOLUTION RESPIRATORY (INHALATION) at 08:03

## 2024-06-18 RX ADMIN — AZITHROMYCIN 500 MG: 250 TABLET, FILM COATED ORAL at 21:02

## 2024-06-18 RX ADMIN — GUAIFENESIN 600 MG: 600 TABLET, EXTENDED RELEASE ORAL at 10:06

## 2024-06-18 RX ADMIN — WARFARIN SODIUM 15 MG: 5 TABLET ORAL at 17:22

## 2024-06-18 RX ADMIN — HEPARIN SODIUM AND DEXTROSE 16 UNITS/KG/HR: 10000; 5 INJECTION INTRAVENOUS at 01:57

## 2024-06-18 NOTE — PROGRESS NOTES
End of Shift Note    Bedside shift change report given to Mckayla BENNETT (oncoming nurse) by Devora Escobedo, RN (offgoing nurse).  Report included the following information SBAR, Intake/Output, MAR, Recent Results, and Cardiac Rhythm NSR    Shift worked:  7p-7a     Shift summary and any significant changes:     ECHO re-ordered per cardiology    Using IS    Heparin therapeutic     Concerns for physician to address:  INR subtherapeutic     Zone phone for oncoming shift:   1148       Activity:     Number times ambulated in hallways past shift: 0  Number of times OOB to chair past shift: 0    Cardiac:   Cardiac Monitoring: Yes           Access:  Current line(s): PIV     Genitourinary:   Urinary status: voiding and external catheter    Respiratory:      Chronic home O2 use?: NO  Incentive spirometer at bedside: YES       GI:     Current diet:  ADULT DIET; Regular  Passing flatus: YES  Tolerating current diet: YES       Pain Management:   Patient states pain is manageable on current regimen: YES    Skin:     Interventions: float heels, increase time out of bed, PT/OT consult, internal/external urinary devices, and nutritional support    Patient Safety:  Fall Score:    Interventions: gripper socks, pt to call before getting OOB, and stay with me (per policy)       Length of Stay:  Expected LOS: 6  Actual LOS: 5      Devora Escobedo, RN

## 2024-06-18 NOTE — PROGRESS NOTES
End of Shift Note    Bedside shift change report given to Roula (oncoming nurse) by ADELINE PHELAN RN (offgoing nurse).  Report included the following information SBAR, Kardex, Intake/Output, Accordion, and Recent Results    Shift worked:  7a-7p     Shift summary and any significant changes:     ECHO done today at bedside    Heparin infusion therapeutic    Bedrest orders discontinued, PT and OT to work with patient to decide plan for discharge     Concerns for physician to address:  Weaned to 2 liters    Lack of INR checks outpatient? Case management involved     Zone phone for oncariadna shift:   1140       Activity:     Number times ambulated in hallways past shift: 0  Number of times OOB to chair past shift: 0    Cardiac:   Cardiac Monitoring: Yes           Access:  Current line(s): PIV     Genitourinary:   Urinary status: voiding    Respiratory:      Chronic home O2 use?: NO  Incentive spirometer at bedside: YES       GI:     Current diet:  ADULT DIET; Regular  Passing flatus: YES  Tolerating current diet: YES       Pain Management:   Patient states pain is manageable on current regimen: YES    Skin:     Interventions: turn team, increase time out of bed, and PT/OT consult    Patient Safety:  Fall Score:    Interventions: bed/chair alarm, pt to call before getting OOB, and stay with me (per policy)       Length of Stay:  Expected LOS: 7  Actual LOS: 5      ADELINE PHELAN RN

## 2024-06-18 NOTE — PLAN OF CARE
Problem: Discharge Planning  Goal: Discharge to home or other facility with appropriate resources  Outcome: Progressing  Flowsheets (Taken 6/18/2024 0258)  Discharge to home or other facility with appropriate resources:   Identify barriers to discharge with patient and caregiver   Arrange for needed discharge resources and transportation as appropriate   Identify discharge learning needs (meds, wound care, etc)   Refer to discharge planning if patient needs post-hospital services based on physician order or complex needs related to functional status, cognitive ability or social support system     Problem: Pain  Goal: Verbalizes/displays adequate comfort level or baseline comfort level  Outcome: Progressing  Flowsheets (Taken 6/18/2024 0258)  Verbalizes/displays adequate comfort level or baseline comfort level:   Encourage patient to monitor pain and request assistance   Assess pain using appropriate pain scale   Administer analgesics based on type and severity of pain and evaluate response   Implement non-pharmacological measures as appropriate and evaluate response     Problem: ABCDS Injury Assessment  Goal: Absence of physical injury  Outcome: Progressing     Problem: Respiratory - Adult  Goal: Achieves optimal ventilation and oxygenation  6/18/2024 0300 by Devora Escobedo, RN  Outcome: Progressing  Flowsheets (Taken 6/18/2024 0258)  Achieves optimal ventilation and oxygenation:   Assess for changes in respiratory status   Assess for changes in mentation and behavior   Position to facilitate oxygenation and minimize respiratory effort   Oxygen supplementation based on oxygen saturation or arterial blood gases   Encourage broncho-pulmonary hygiene including cough, deep breathe, incentive spirometry   Assess the need for suctioning and aspirate as needed   Assess and instruct to report shortness of breath or any respiratory difficulty   Respiratory therapy support as indicated  6/17/2024 1840 by Mya Adams,

## 2024-06-18 NOTE — PROGRESS NOTES
Physical Therapy:    Attempted to see this pt for PT session. Pt resting comfortably in bed reporting that she has been continuing to mobilize to the BSC throughout the day and is now feeling very fatigued, requesting therapy to come back tomorrow. Will defer and continue to follow as appropriate. Thanks.    Serenity Shah, PTA

## 2024-06-18 NOTE — PROGRESS NOTES
Pharmacist Daily Dosing of Warfarin    Indication & Goal INR: PE, INR Goal 2-3    PTA Warfarin Dose: new start    Notable concurrent conditions and medications: azithromycin    Labs:  Recent Labs     Units 06/18/24  0416 06/17/24  0353 06/16/24  0320   INR   1.3* 1.1 1.1   HGB g/dL 7.7* 7.9* 7.7*   PLT K/uL 277 270 248         Impression/Plan:   INR continues to remain subtherapeutic  Warfarin 15 mg x 1 tonight. Will consider increasing dose if INR remains subtherapeutic   Bridging with heparin  Daily INR - ordered until 7/6  CBC w/o differential every other day - ordered until 6/23     Pharmacy will follow daily and adjust the dose as appropriate.    Thank you,  Cash Grimm [facility]      Warfarin Protocol    Located on pharmacy Teams site: Clinical Practice -> Anticoagulation & Cardiology -> Anticoagulation Policies, Protocols, Guidance

## 2024-06-18 NOTE — PROGRESS NOTES
Hospitalist Progress Note    NAME:   Diana Snell   : 1990   MRN: [id number]     Date/Time: 2024 2:27 PM  Patient PCP: Sonya Abreu MD    Estimated discharge date:   Barriers: Echocardiogram, weaning oxygen, placement, difficult placement due to lack of payer source, will need a established mechanism to check INR if she were to go home    Assessment / Plan:  Acute hypoxic respiratory failure  Acute pulmonary embolism    cxr suspected new rt airspace disease/pna vs pulmonary infarct  cta chest + subacute to chronic pulmonary emboli in upper and lower lobes  Ct a/p neg acute infectious processes or findings, has IUD in uterus as expected w/o complications  Covid and flu negative  Pulmonary and hematology consulted  Per hematology, hypercoagulable workup was negative and they recommended warfarin due to cost issues  Patient was not taking Xarelto due to issues with cost    -continue heparin drip plus warfarin.  INR is 1.3.  Check INR daily.  Pharmacy managing.  Echocardiogram still pending.  Notified nursing about pending echocardiogram.  She is currently on 4 L nasal cannula and is being weaned off.  Seen by PT OT and recommended IPR placement.  Case management consulted.  She does not have a clear source so placement will be difficult.    Troponin elevation due to pulmonary embolism.  -Noted recommendations from cardiology.  Per cardiology no workup needed for troponin elevation as these are related to pulmonary embolism.     Mild acute hyponatremia, asymptomatic  -Sodium is improved and is 138     Mild acute hyperglycemia w/o known h/o T2DM  Hemoglobin A1c 5.5  She does not have diabetes, sliding scale stopped    Chronic anemia, hgb stable, w/o acute active bleeding, states known menorrhagia  Iron profile suggestive of STELLA  Continue added Venofer    Chronic leukocytosis  -WBC elevated since at least 2019.  Presents with a white count of around 13,000.  WBC now holding around 14.  She

## 2024-06-18 NOTE — CARE COORDINATION
Transition of Care Plan:    RUR: 14% (Moderate RUR)  Prior Level of Functioning: Needs assistance with ADLS/IADLS  Disposition: Plan A: IPR (Laura bed)  Plan B: Home with follow up   If SNF or IPR: Date FOC offered: 06/17/18  Date FOC received: 06/18  Accepting facility: Pending  Date authorization started with reference number:   Date authorization received and expires:   Follow up appointments: pcp/specialist   DME needed: deferred to IPR  Transportation at discharge: The patient's spouse, Demi Sarmiento,(Boyfriend) [phone]   IM/IMM Medicare/ letter given: na  Is patient a [place] and connected with [facility]? na   If yes, was [place] transfer form completed and [facility] notified? na  Caregiver Contact: Demi Sarmiento,(Dhruvfriend) [phone]   Discharge Caregiver contacted prior to discharge? Will be contacted  Care Conference needed? no  Barriers to discharge: Medical clearance    The CM reviewed PT/OT notes to note that the patient suggested to receive acute inpatient rehab or home health. Because the patient does not have an active insurance, a laura bed application of the following facilities were provided:  [facility] [phone]  [facility] [phone]    CM received the completed application and referrals were sent to both facilities via [employer]. Moreover, the patient needs INR checks, [email or web address] was emailed to inquire about the cost of med management clinic which is determined to be $250 to $315. CM will contact the [facility] during normal business hours to inquire about the cost because CM was not able to locate any other INR clinic with low/discounted costs.     The patient is alert, able to communicate  needs effectively,  independent in ADLS/IADLS, and able to drive. She currently resides with her boyfriend in an apartment with 15 steps to enter, Demi Sarmiento,(Boyfriend) [phone] and also has three children, ages  17, 14, and 6. She has a CPAP and a nebulizer.  The patient's boyfriend will provide a ride.         Vicky Shane RN  Case Management  [phone]

## 2024-06-18 NOTE — PROGRESS NOTES
Occupational Therapy     Chart reviewed pt, RN cleared pt to work with therapy services. Pt politely deferring as pt had just gotten situated into bed for a nap. Will defer and continue to follow pt, additionally will inquire with attending to discuss mobility restrictions for pt to safely increase activity tolerance.     Ralph Bello, ENRICO, OTR/L

## 2024-06-19 LAB
ANION GAP SERPL CALC-SCNC: 5 MMOL/L (ref 5–15)
BACTERIA SPEC CULT: NORMAL
BACTERIA SPEC CULT: NORMAL
BASOPHILS # BLD: 0 K/UL (ref 0–0.1)
BASOPHILS NFR BLD: 0 % (ref 0–1)
BUN SERPL-MCNC: 5 MG/DL (ref 6–20)
BUN/CREAT SERPL: 9 (ref 12–20)
CALCIUM SERPL-MCNC: 9.1 MG/DL (ref 8.5–10.1)
CHLORIDE SERPL-SCNC: 106 MMOL/L (ref 97–108)
CO2 SERPL-SCNC: 25 MMOL/L (ref 21–32)
CREAT SERPL-MCNC: 0.53 MG/DL (ref 0.55–1.02)
DIFFERENTIAL METHOD BLD: ABNORMAL
EOSINOPHIL # BLD: 0.3 K/UL (ref 0–0.4)
EOSINOPHIL NFR BLD: 2 % (ref 0–7)
ERYTHROCYTE [DISTWIDTH] IN BLOOD BY AUTOMATED COUNT: 21.2 % (ref 11.5–14.5)
GLUCOSE SERPL-MCNC: 93 MG/DL (ref 65–100)
HCT VFR BLD AUTO: 27.4 % (ref 35–47)
HGB BLD-MCNC: 7.9 G/DL (ref 11.5–16)
IMM GRANULOCYTES # BLD AUTO: 0 K/UL (ref 0–0.04)
IMM GRANULOCYTES NFR BLD AUTO: 0 % (ref 0–0.5)
INR PPP: 1.3 (ref 0.9–1.1)
LYMPHOCYTES # BLD: 2.9 K/UL (ref 0.8–3.5)
LYMPHOCYTES NFR BLD: 20 % (ref 12–49)
MCH RBC QN AUTO: 22.6 PG (ref 26–34)
MCHC RBC AUTO-ENTMCNC: 28.8 G/DL (ref 30–36.5)
MCV RBC AUTO: 78.3 FL (ref 80–99)
MONOCYTES # BLD: 0.3 K/UL (ref 0–1)
MONOCYTES NFR BLD: 2 % (ref 5–13)
NEUTS SEG # BLD: 11.1 K/UL (ref 1.8–8)
NEUTS SEG NFR BLD: 76 % (ref 32–75)
NRBC # BLD: 0.22 K/UL (ref 0–0.01)
NRBC BLD-RTO: 1.5 PER 100 WBC
PLATELET # BLD AUTO: 293 K/UL (ref 150–400)
PMV BLD AUTO: 10.5 FL (ref 8.9–12.9)
POTASSIUM SERPL-SCNC: 3.8 MMOL/L (ref 3.5–5.1)
PROCALCITONIN SERPL-MCNC: <0.05 NG/ML
PROTHROMBIN TIME: 13.7 SEC (ref 9–11.1)
RBC # BLD AUTO: 3.5 M/UL (ref 3.8–5.2)
RBC MORPH BLD: ABNORMAL
SERVICE CMNT-IMP: NORMAL
SERVICE CMNT-IMP: NORMAL
SODIUM SERPL-SCNC: 136 MMOL/L (ref 136–145)
UFH PPP CHRO-ACNC: 0.32 IU/ML
WBC # BLD AUTO: 14.6 K/UL (ref 3.6–11)

## 2024-06-19 PROCEDURE — 6370000000 HC RX 637 (ALT 250 FOR IP): Performed by: INTERNAL MEDICINE

## 2024-06-19 PROCEDURE — 97116 GAIT TRAINING THERAPY: CPT

## 2024-06-19 PROCEDURE — 84145 PROCALCITONIN (PCT): CPT

## 2024-06-19 PROCEDURE — 6370000000 HC RX 637 (ALT 250 FOR IP): Performed by: STUDENT IN AN ORGANIZED HEALTH CARE EDUCATION/TRAINING PROGRAM

## 2024-06-19 PROCEDURE — 94640 AIRWAY INHALATION TREATMENT: CPT

## 2024-06-19 PROCEDURE — 6370000000 HC RX 637 (ALT 250 FOR IP): Performed by: NURSE PRACTITIONER

## 2024-06-19 PROCEDURE — 97535 SELF CARE MNGMENT TRAINING: CPT

## 2024-06-19 PROCEDURE — 80048 BASIC METABOLIC PNL TOTAL CA: CPT

## 2024-06-19 PROCEDURE — 2060000000 HC ICU INTERMEDIATE R&B

## 2024-06-19 PROCEDURE — 2580000003 HC RX 258: Performed by: NURSE PRACTITIONER

## 2024-06-19 PROCEDURE — 36415 COLL VENOUS BLD VENIPUNCTURE: CPT

## 2024-06-19 PROCEDURE — 6360000002 HC RX W HCPCS: Performed by: NURSE PRACTITIONER

## 2024-06-19 PROCEDURE — 6360000002 HC RX W HCPCS: Performed by: STUDENT IN AN ORGANIZED HEALTH CARE EDUCATION/TRAINING PROGRAM

## 2024-06-19 PROCEDURE — 85025 COMPLETE CBC W/AUTO DIFF WBC: CPT

## 2024-06-19 PROCEDURE — 85520 HEPARIN ASSAY: CPT

## 2024-06-19 PROCEDURE — 97530 THERAPEUTIC ACTIVITIES: CPT

## 2024-06-19 PROCEDURE — 85610 PROTHROMBIN TIME: CPT

## 2024-06-19 PROCEDURE — 94660 CPAP INITIATION&MGMT: CPT

## 2024-06-19 PROCEDURE — 2700000000 HC OXYGEN THERAPY PER DAY

## 2024-06-19 RX ORDER — ENOXAPARIN SODIUM 150 MG/ML
120 INJECTION SUBCUTANEOUS ONCE
Status: COMPLETED | OUTPATIENT
Start: 2024-06-19 | End: 2024-06-19

## 2024-06-19 RX ORDER — FERROUS SULFATE 325(65) MG
325 TABLET ORAL
Status: DISCONTINUED | OUTPATIENT
Start: 2024-06-20 | End: 2024-06-21 | Stop reason: HOSPADM

## 2024-06-19 RX ORDER — ENOXAPARIN SODIUM 150 MG/ML
120 INJECTION SUBCUTANEOUS 2 TIMES DAILY
Status: DISCONTINUED | OUTPATIENT
Start: 2024-06-20 | End: 2024-06-21 | Stop reason: HOSPADM

## 2024-06-19 RX ORDER — WARFARIN SODIUM 5 MG/1
15 TABLET ORAL
Status: COMPLETED | OUTPATIENT
Start: 2024-06-19 | End: 2024-06-19

## 2024-06-19 RX ADMIN — IPRATROPIUM BROMIDE AND ALBUTEROL SULFATE 1 DOSE: .5; 3 SOLUTION RESPIRATORY (INHALATION) at 22:06

## 2024-06-19 RX ADMIN — Medication: at 21:04

## 2024-06-19 RX ADMIN — GUAIFENESIN 600 MG: 600 TABLET, EXTENDED RELEASE ORAL at 09:34

## 2024-06-19 RX ADMIN — SODIUM CHLORIDE, PRESERVATIVE FREE 10 ML: 5 INJECTION INTRAVENOUS at 21:04

## 2024-06-19 RX ADMIN — SODIUM CHLORIDE, PRESERVATIVE FREE 10 ML: 5 INJECTION INTRAVENOUS at 09:34

## 2024-06-19 RX ADMIN — GUAIFENESIN 600 MG: 600 TABLET, EXTENDED RELEASE ORAL at 21:04

## 2024-06-19 RX ADMIN — METOPROLOL SUCCINATE 25 MG: 25 TABLET, EXTENDED RELEASE ORAL at 09:33

## 2024-06-19 RX ADMIN — ENOXAPARIN SODIUM 120 MG: 150 INJECTION SUBCUTANEOUS at 13:26

## 2024-06-19 RX ADMIN — WARFARIN SODIUM 15 MG: 5 TABLET ORAL at 17:20

## 2024-06-19 RX ADMIN — IPRATROPIUM BROMIDE AND ALBUTEROL SULFATE 1 DOSE: .5; 3 SOLUTION RESPIRATORY (INHALATION) at 09:28

## 2024-06-19 RX ADMIN — Medication: at 09:34

## 2024-06-19 RX ADMIN — AZITHROMYCIN 500 MG: 250 TABLET, FILM COATED ORAL at 21:03

## 2024-06-19 RX ADMIN — HEPARIN SODIUM AND DEXTROSE 16 UNITS/KG/HR: 10000; 5 INJECTION INTRAVENOUS at 05:13

## 2024-06-19 NOTE — PROGRESS NOTES
End of Shift Note    Bedside shift change report given to Roula (oncoming nurse) by ADELINE PHELAN RN (offgoing nurse).  Report included the following information SBAR, Kardex, Procedure Summary, Intake/Output, and Recent Results    Shift worked:  7a-7p     Shift summary and any significant changes:     Heparin drip stopped, started on Lovenox    Weaned to room air, tolerating ok. Physical and occupational therapy worked with patient today walking in room  Oxygen did drop to 85% once patient back to bed and sleeping, placed back on 1 liter     Concerns for physician to address:  Discharge planning including costs of INR checks; case management involved     Zone phone for oncoming shift:   [phone]       Activity:     Number times ambulated in hallways past shift: 1  Number of times OOB to chair past shift: 2    Cardiac:   Cardiac Monitoring: Yes           Access:  Current line(s): PIV     Genitourinary:   Urinary status: voiding    Respiratory:      Chronic home O2 use?: NO  Incentive spirometer at bedside: YES       GI:     Current diet:  ADULT DIET; Regular  Passing flatus: YES  Tolerating current diet: YES       Pain Management:   Patient states pain is manageable on current regimen: YES    Skin:     Interventions: turn team and increase time out of bed    Patient Safety:  Fall Score:    Interventions: gripper socks and pt to call before getting OOB       Length of Stay:  Expected LOS: 8  Actual LOS: 6      ADELINE PHELAN RN

## 2024-06-19 NOTE — PLAN OF CARE
Problem: Occupational Therapy - Adult  Goal: By Discharge: Performs self-care activities at highest level of function for planned discharge setting.  See evaluation for individualized goals.  Description: FUNCTIONAL STATUS PRIOR TO ADMISSION:  pt w/ hx of chronic PE since 2018 and within last ~2 weeks has stated increased general weakness and RLQ stomach pain; pt states independence with ADLs and ambulation, but her partner has been assisting more with meal prep and IADL completion =  Receives Help From: Family, ADL Assistance: Independent,  ,  ,  ,  ,  ,  , Ambulation Assistance: Independent, Transfer Assistance: Independent, Active : Yes     HOME SUPPORT: Patient lived w/ significant other.    Occupational Therapy Goals:  Initiated 6/14/2024  1.  Patient will perform grooming while standing at sink with Contact Guard Assist within 7 day(s).  2.  Patient will perform upper body dressing with Supervision within 7 day(s).  3.  Patient will perform lower body dressing with Minimal Assist within 7 day(s).  4.  Patient will perform toilet transfers with Contact Guard Assist  within 7 day(s).  5.  Patient will perform all aspects of toileting with Contact Guard Assist within 7 day(s).  6.  Patient will participate in upper extremity therapeutic exercise/activities with Stand by Assist for 5 minutes within 7 day(s).    7.  Patient will utilize energy conservation techniques during functional activities with verbal cues within 7 day(s).   Outcome: Progressing     OCCUPATIONAL THERAPY TREATMENT/DISCHARGE  Patient: Diana Snell (33 y.o. female)  Date: 6/19/2024  Primary Diagnosis: Acute and chr resp failure, unsp w hypoxia or hypercapnia (HCC) [J96.20]  Pneumonia due to infectious organism, unspecified laterality, unspecified part of lung [J18.9]  Acute pulmonary embolism without acute cor pulmonale, unspecified pulmonary embolism type (HCC) [I26.99]       Precautions: Fall Risk                  Chart, occupational  therapy assessment, plan of care, and goals were reviewed.    ASSESSMENT  Patient continues with skilled OT services and has progressed towards goals.  Pt received sitting upright in chair, playing leslie with significant other; cleared to work with therapy via RN. Pt on 1L NC satting in high 90s at rest and ranged from 92-98% with and without activity. Placed pt on RA and completed ambulation and ADL tasks and 02 ranged from 92-94%. Pt completed stair training w/ PT (no LOB noted or unsteadiness) and during ADL completion pt complete LE dressing w/ independence,  UE dressing w/ setup, toileting and standing grooming tasks with independence. Pt states no concerns about returning home with s/o and has excelled with therapy services. Currently, pt does not require further acute care OT services. Educated pt about continuing to complete ADLs w/ RN awareness for safety and ambulating with RN staff. Pt is discharged from acute care OT services.              PLAN :  Discharge from acute OT    Recommendation for discharge: (in order for the patient to meet his/her long term goals): No skilled occupational therapy      IF patient discharges home will need the following DME: none     SUBJECTIVE:   Patient stated “I told yall I could do this.”    OBJECTIVE DATA SUMMARY:     Cognitive/Behavioral Status:  Orientation  Overall Orientation Status: Within Functional Limits  Orientation Level: Oriented X4  Cognition  Overall Cognitive Status: WFL    Functional Mobility and Transfers for ADLs:  Bed Mobility:  Bed Mobility Training  Bed Mobility Training: No (Pt received sitting in bedside chair)     Transfers:   Transfer Training  Transfer Training: Yes  Interventions: Safety awareness training;Verbal cues  Sit to Stand: Independent  Stand to Sit: Independent  Bed to Chair: Independent  Toilet Transfer: Independent    Balance:     Balance  Sitting: Intact  Standing: Intact    ADL Intervention:      Grooming: Independent  Grooming

## 2024-06-19 NOTE — PLAN OF CARE
significant improvements with mobility. Good tolerance to increased gait distance and able to perform stair training with no issues. Pt does not require any acute needs or needs at discharge at this time. Pt positioned to comfort sitting in bedside chair with all needs within reach and in NAD.        PLAN:  Maximum therapeutic benefit has been met at current level of care and patient will be discharged from physical therapy at this time.    Rationale for discharge:  Goals achieved    Recommendation for discharge: (in order for the patient to meet his/her long term goals): No skilled physical therapy    Other factors to consider for discharge: no additional factors    IF patient discharges home will need the following DME: none     SUBJECTIVE:   Patient stated, \"I feel really good today.\"    OBJECTIVE DATA SUMMARY:   Critical Behavior:  Orientation  Overall Orientation Status: Within Functional Limits  Orientation Level: Oriented X4  Cognition  Overall Cognitive Status: WFL    Functional Mobility Training:  Bed Mobility:  Bed Mobility Training  Bed Mobility Training: No (Pt received sitting in bedside chair)  Transfers:  Transfer Training  Transfer Training: Yes  Interventions: Safety awareness training;Verbal cues  Sit to Stand: Independent  Stand to Sit: Independent  Bed to Chair: Independent  Toilet Transfer: Independent  Balance:  Balance  Sitting: Intact  Standing: Intact   Ambulation/Gait Training:     Gait  Gait Training: Yes  Overall Level of Assistance: Stand-by assistance;Supervision  Distance (ft): 150 Feet  Assistive Device: None  Interventions: Safety awareness training;Verbal cues  Gait Abnormalities: Trunk sway increased  Rail Use: Left  Stairs - Level of Assistance: Stand-by assistance  Number of Stairs Trained: 15                  Pain Rating:    Pain Intervention(s):       Activity Tolerance:   WNL and Good    After treatment:   Patient left in no apparent distress sitting up in chair, Call bell  within reach, Caregiver / family present, and Updated patient's board on functional status and mobility recommendations      COMMUNICATION/EDUCATION:   The patient's plan of care was discussed with: occupational therapist and registered nurse    Patient Education  Education Given To: Patient  Education Provided: Role of Therapy;Plan of Care;Transfer Training  Education Method: Verbal  Barriers to Learning: None  Education Outcome: Demonstrated understanding;Verbalized understanding      Serenity Shah PTA  Minutes: 19

## 2024-06-19 NOTE — CARE COORDINATION
Transition of Care Plan:    RUR: 14% (Moderate RUR)  Prior Level of Functioning: Needs assistance with ADLS/IADLS  Disposition: Plan A: IPR ([facility] bed)  Plan B: Home with follow up   If SNF or IPR: Date FOC offered: 06/17/18  Date FOC received: 06/18  Accepting facility: Pending  Date authorization started with reference number:   Date authorization received and expires:   Follow up appointments: pcp/specialist   DME needed: deferred to IPR  Transportation at discharge: The patient's spouse, Demi Sarmiento,([place]) [phone]   IM/IMM Medicare/ letter given: na  Is patient a [place] and connected with [facility]? na   If yes, was [place] transfer form completed and [facility] notified? na  Caregiver Contact: Demi Sarmiento([place]) [phone]   Discharge Caregiver contacted prior to discharge? Will be contacted  Care Conference needed? no  Barriers to discharge: Medical clearance    [facility] reached out to [facility]. After several calls, it was determined that the fee for an INR check is $70.55 per visit, excluding additional charges. Consequently, CM facilitated a connection with [facility] who confirmed their ability to provide INR checks and have their provider follow up the medication management. [facility]ESAR scheduled an appointment for the patient on Wednesday, the 26th, at 1 p.m. CM also provided the patient with the necessary financial application, primary care provider application, and detailed information about the services, including anticipated fees and total costs, address, website, and phone number, ensuring the patient's confirmation of financial capability to cover them.  Dr. Alexis was updated      The patient has reported her preference is to return home instead of receiving inpatient rehab. [facility] stated there are no laura beds. However, [facility] is considering her but did not confirm yet.  The patient is alert, able to communicate  needs effectively,

## 2024-06-19 NOTE — PROGRESS NOTES
Hospitalist Progress Note    NAME:   Diana Snell   : 1990   MRN: [id number]     Date/Time: 2024 1:24 PM  Patient PCP: Sonya Abreu MD    Estimated discharge date:  Barriers:       Assessment / Plan:  Acute hypoxic respiratory failure  Acute pulmonary embolism     cxr suspected new rt airspace disease/pna vs pulmonary infarct  cta chest + subacute to chronic pulmonary emboli in upper and lower lobes  Ct a/p neg acute infectious processes or findings, has IUD in uterus as expected w/o complications  Echo:Normal left ventricular systolic function with a visually estimated EF of 55 - 60%. Left ventricle size is normal. Mildly increased wall thickness. Normal wall motion.    Right Ventricle: Right ventricle is moderately dilated. Mildly reduced systolic function.    Tricuspid Valve: The estimated RVSP is 35 mmHg.    Right Atrium: Right atrium is moderately dilated.  Covid and flu negative  Procalcitonin <0.05  Pulmonary evaluated  Per hematology, hypercoagulable workup was negative and they recommended warfarin due to cost issues  Patient was not taking Xarelto due to issues with cost     -S/p heparin drip   -started on full dose Lovenox on  warfarin.  INR is 1.3.  Check INR daily.  Pharmacy managing.    She is currently on 2 L nasal cannula and wean off as tolerated  Seen by PT OT and recommended IPR placement.  Case management consulted.  She does not have a clear source so placement will be difficult.     Mildly elevated troponin of uncertain cause, probable recurrent pulmonary emboli   - Per cardiology no workup needed for troponin elevation as these are related to pulmonary embolism.Continue with warfarin      Mild acute hyponatremia, asymptomatic- resolved   -Sodium is improved and is 136      T2DM- well controlled   Hemoglobin A1c 5.5  She does not have diabetes, sliding scale stopped     Chronic anemia, hgb stable, w/o acute active bleeding, states known menorrhagia  Iron profile

## 2024-06-19 NOTE — PROGRESS NOTES
End of Shift Note    Bedside shift change report given to DONALD Hamlin(oncoming nurse) by Roula Shane RN (offgoing nurse).  Report included the following information SBAR, Kardex, Intake/Output, MAR, and Recent Results    Shift worked:  night     Shift summary and any significant changes:     No significant changes overnight     Concerns for physician to address:       Zone phone for oncoming shift:       Activity:     Number times ambulated in hallways past shift: 0  Number of times OOB to chair past shift: 0    Cardiac:   Cardiac Monitoring: Yes           Access:  Current line(s): PIV     Genitourinary:   Urinary status: voiding and external catheter    Respiratory:      Chronic home O2 use?: NO  Incentive spirometer at bedside: YES       GI:     Current diet:  ADULT DIET; Regular  Passing flatus: YES  Tolerating current diet: YES       Pain Management:   Patient states pain is manageable on current regimen: YES    Skin:     Interventions: increase time out of bed and PT/OT consult    Patient Safety:  Fall Score:    Interventions: bed/chair alarm, assistive device (walker, cane. etc), gripper socks, and pt to call before getting OOB       Length of Stay:  Expected LOS: 7  Actual LOS: 6      Roula Shane RN

## 2024-06-19 NOTE — PLAN OF CARE
Respiratory Care Note    Patient currently on a humidified nasal cannula set a 2LPM with oxygen saturation of 94%. No oxygen titration performed at this time. Patient does wear a CPAP at home, but does not have oxygen at home. No acute respiratory distress noted.

## 2024-06-19 NOTE — PROGRESS NOTES
Pharmacist Daily Dosing of Warfarin    Indication & Goal INR: PE, INR Goal 2-3    PTA Warfarin Dose: new start    Notable concurrent conditions and medications: azithromycin    Labs:  Recent Labs     Units 06/19/24  0342 06/18/24  0416 06/17/24  0353   INR   1.3* 1.3* 1.1   HGB g/dL 7.9* 7.7* 7.9*   PLT K/uL 293 277 270         Impression/Plan:   Warfarin 15 mg x 1 tonight  Concern that increasing warfarin dose today will cause patient to be supratherapeutic as INR change will not be seen immediately  Heparin d/c'd; will now bridge with therapeutic enoxaparin  Daily INR - ordered until 7/6  CBC w/o differential every other day - ordered until 6/23     Pharmacy will follow daily and adjust the dose as appropriate.    Thank you,  Cash Grimm [facility]      Warfarin Protocol    Located on pharmacy Teams site: Clinical Practice -> Anticoagulation & Cardiology -> Anticoagulation Policies, Protocols, Guidance

## 2024-06-19 NOTE — PROGRESS NOTES
Cardiology Progress Note      6/19/2024 9:32 AM    Admit Date: 6/13/2024          Subjective: Echo showed nl LVEF, dilated RV . No new c/o          /80   Pulse 79   Temp 98.2 °F (36.8 °C) (Oral)   Resp 16   Ht 1.7 m (5' 6.93\")   Wt 118 kg (260 lb 3.2 oz)   SpO2 98%   BMI 40.84 kg/m²   06/17 1901 - 06/19 0700  In: 1511 [P.O.:815; I.V.:696]  Out: 2850 [Urine:2850]        Objective:      Physical Exam:  VS as above     Data Review:   Labs:    INR 1.3   Creat 0.5  Hgb 7.9     Telemetry: SR       Assessment:       1. Mildly elevated troponin of uncertain cause, probable recurrent pulmonary emboli   2. History of chronic pulmonary emboli as demonstrated by CT.  3. Hypertension.  4. Sleep apnea, on CPAP.  5. Obesity.  6. Anemia.  7. Remote history of cervical cancer with prior surgery.    Plan: Cont adjusting warfarin  to target INR 2-3 . Overall findings c/w mild trop elevation due to recurrent PE. Will see back on request

## 2024-06-19 NOTE — PLAN OF CARE
Problem: Discharge Planning  Goal: Discharge to home or other facility with appropriate resources  Outcome: Progressing  Flowsheets (Taken 6/18/2024 0800 by Boubacar Meyer, RN)  Discharge to home or other facility with appropriate resources:   Identify barriers to discharge with patient and caregiver   Arrange for needed discharge resources and transportation as appropriate   Arrange for interpreters to assist at discharge as needed     Problem: Pain  Goal: Verbalizes/displays adequate comfort level or baseline comfort level  Outcome: Progressing  Flowsheets  Taken 6/18/2024 1550 by Boubacar Meyer, RN  Verbalizes/displays adequate comfort level or baseline comfort level:   Encourage patient to monitor pain and request assistance   Assess pain using appropriate pain scale  Taken 6/18/2024 1511 by Boubacar Meyer, RN  Verbalizes/displays adequate comfort level or baseline comfort level:   Encourage patient to monitor pain and request assistance   Assess pain using appropriate pain scale  Taken 6/18/2024 1430 by Boubacar Meyer, RN  Verbalizes/displays adequate comfort level or baseline comfort level:   Encourage patient to monitor pain and request assistance   Assess pain using appropriate pain scale  Taken 6/18/2024 1050 by Boubacar Meyer, RN  Verbalizes/displays adequate comfort level or baseline comfort level:   Assess pain using appropriate pain scale   Encourage patient to monitor pain and request assistance     Problem: ABCDS Injury Assessment  Goal: Absence of physical injury  Outcome: Progressing     Problem: Respiratory - Adult  Goal: Achieves optimal ventilation and oxygenation  6/18/2024 2052 by Roula Shane, RN  Outcome: Progressing  Flowsheets (Taken 6/18/2024 1955 by Marni Bennett RCP)  Achieves optimal ventilation and oxygenation:   Oxygen supplementation based on oxygen saturation or arterial blood gases   Respiratory therapy support as indicated   Encourage broncho-pulmonary hygiene including

## 2024-06-20 LAB
ANION GAP SERPL CALC-SCNC: 4 MMOL/L (ref 5–15)
BASOPHILS # BLD: 0 K/UL (ref 0–0.1)
BASOPHILS NFR BLD: 0 % (ref 0–1)
BUN SERPL-MCNC: 6 MG/DL (ref 6–20)
BUN/CREAT SERPL: 11 (ref 12–20)
CALCIUM SERPL-MCNC: 9.5 MG/DL (ref 8.5–10.1)
CHLORIDE SERPL-SCNC: 105 MMOL/L (ref 97–108)
CO2 SERPL-SCNC: 26 MMOL/L (ref 21–32)
CREAT SERPL-MCNC: 0.55 MG/DL (ref 0.55–1.02)
DIFFERENTIAL METHOD BLD: ABNORMAL
EOSINOPHIL # BLD: 0.6 K/UL (ref 0–0.4)
EOSINOPHIL NFR BLD: 4 % (ref 0–7)
ERYTHROCYTE [DISTWIDTH] IN BLOOD BY AUTOMATED COUNT: 21.8 % (ref 11.5–14.5)
GLUCOSE SERPL-MCNC: 87 MG/DL (ref 65–100)
HCT VFR BLD AUTO: 28.7 % (ref 35–47)
HGB BLD-MCNC: 8.2 G/DL (ref 11.5–16)
IMM GRANULOCYTES # BLD AUTO: 0 K/UL (ref 0–0.04)
IMM GRANULOCYTES NFR BLD AUTO: 0 % (ref 0–0.5)
INR PPP: 2.1 (ref 0.9–1.1)
LYMPHOCYTES # BLD: 3.6 K/UL (ref 0.8–3.5)
LYMPHOCYTES NFR BLD: 26 % (ref 12–49)
MCH RBC QN AUTO: 22.3 PG (ref 26–34)
MCHC RBC AUTO-ENTMCNC: 28.6 G/DL (ref 30–36.5)
MCV RBC AUTO: 78.2 FL (ref 80–99)
MONOCYTES # BLD: 0.6 K/UL (ref 0–1)
MONOCYTES NFR BLD: 4 % (ref 5–13)
NEUTS BAND NFR BLD MANUAL: 1 %
NEUTS SEG # BLD: 9 K/UL (ref 1.8–8)
NEUTS SEG NFR BLD: 65 % (ref 32–75)
NRBC # BLD: 0.15 K/UL (ref 0–0.01)
NRBC BLD-RTO: 1.1 PER 100 WBC
PLATELET # BLD AUTO: 310 K/UL (ref 150–400)
PMV BLD AUTO: 10.1 FL (ref 8.9–12.9)
POTASSIUM SERPL-SCNC: 4.1 MMOL/L (ref 3.5–5.1)
PROTHROMBIN TIME: 20.6 SEC (ref 9–11.1)
RBC # BLD AUTO: 3.67 M/UL (ref 3.8–5.2)
RBC MORPH BLD: ABNORMAL
SODIUM SERPL-SCNC: 135 MMOL/L (ref 136–145)
WBC # BLD AUTO: 13.8 K/UL (ref 3.6–11)
WBC MORPH BLD: ABNORMAL

## 2024-06-20 PROCEDURE — 85610 PROTHROMBIN TIME: CPT

## 2024-06-20 PROCEDURE — 2700000000 HC OXYGEN THERAPY PER DAY

## 2024-06-20 PROCEDURE — 80048 BASIC METABOLIC PNL TOTAL CA: CPT

## 2024-06-20 PROCEDURE — 6370000000 HC RX 637 (ALT 250 FOR IP): Performed by: NURSE PRACTITIONER

## 2024-06-20 PROCEDURE — 6370000000 HC RX 637 (ALT 250 FOR IP): Performed by: STUDENT IN AN ORGANIZED HEALTH CARE EDUCATION/TRAINING PROGRAM

## 2024-06-20 PROCEDURE — 6360000002 HC RX W HCPCS: Performed by: STUDENT IN AN ORGANIZED HEALTH CARE EDUCATION/TRAINING PROGRAM

## 2024-06-20 PROCEDURE — 2580000003 HC RX 258: Performed by: NURSE PRACTITIONER

## 2024-06-20 PROCEDURE — 94640 AIRWAY INHALATION TREATMENT: CPT

## 2024-06-20 PROCEDURE — 6370000000 HC RX 637 (ALT 250 FOR IP): Performed by: INTERNAL MEDICINE

## 2024-06-20 PROCEDURE — 36415 COLL VENOUS BLD VENIPUNCTURE: CPT

## 2024-06-20 PROCEDURE — 1100000003 HC PRIVATE W/ TELEMETRY

## 2024-06-20 PROCEDURE — 85025 COMPLETE CBC W/AUTO DIFF WBC: CPT

## 2024-06-20 RX ADMIN — SODIUM CHLORIDE, PRESERVATIVE FREE 10 ML: 5 INJECTION INTRAVENOUS at 21:02

## 2024-06-20 RX ADMIN — GUAIFENESIN 600 MG: 600 TABLET, EXTENDED RELEASE ORAL at 10:04

## 2024-06-20 RX ADMIN — GUAIFENESIN 600 MG: 600 TABLET, EXTENDED RELEASE ORAL at 21:01

## 2024-06-20 RX ADMIN — IPRATROPIUM BROMIDE AND ALBUTEROL SULFATE 1 DOSE: .5; 3 SOLUTION RESPIRATORY (INHALATION) at 20:08

## 2024-06-20 RX ADMIN — Medication: at 10:06

## 2024-06-20 RX ADMIN — METOPROLOL SUCCINATE 25 MG: 25 TABLET, EXTENDED RELEASE ORAL at 10:04

## 2024-06-20 RX ADMIN — ENOXAPARIN SODIUM 120 MG: 150 INJECTION SUBCUTANEOUS at 21:01

## 2024-06-20 RX ADMIN — ENOXAPARIN SODIUM 120 MG: 150 INJECTION SUBCUTANEOUS at 10:07

## 2024-06-20 RX ADMIN — Medication: at 21:01

## 2024-06-20 RX ADMIN — IPRATROPIUM BROMIDE AND ALBUTEROL SULFATE 1 DOSE: .5; 3 SOLUTION RESPIRATORY (INHALATION) at 10:36

## 2024-06-20 RX ADMIN — SODIUM CHLORIDE, PRESERVATIVE FREE 10 ML: 5 INJECTION INTRAVENOUS at 10:06

## 2024-06-20 RX ADMIN — FERROUS SULFATE TAB 325 MG (65 MG ELEMENTAL FE) 325 MG: 325 (65 FE) TAB at 10:04

## 2024-06-20 NOTE — PROGRESS NOTES
End of Shift Note    Bedside shift change report given to DONALD Graf (oncoming nurse) by Ruola Shane RN (offgoing nurse).  Report included the following information SBAR, Kardex, Intake/Output, MAR, and Recent Results    Shift worked:  night     Shift summary and any significant changes:    INR 2.1; no other significant changes overnight     Concerns for physician to address:      Zone phone for oncoming shift:       Activity:     Number times ambulated in hallways past shift: 0  Number of times OOB to chair past shift: 0    Cardiac:   Cardiac Monitoring: Yes           Access:  Current line(s): PIV     Genitourinary:   Urinary status: voiding    Respiratory:      Chronic home O2 use?: NO  Incentive spirometer at bedside: YES       GI:     Current diet:  ADULT DIET; Regular  Passing flatus: YES  Tolerating current diet: YES       Pain Management:   Patient states pain is manageable on current regimen: YES    Skin:     Interventions: increase time out of bed and PT/OT consult    Patient Safety:  Fall Score:    Interventions: gripper socks and pt to call before getting OOB       Length of Stay:  Expected LOS: 8  Actual LOS: 7      Roula Shane RN

## 2024-06-20 NOTE — PROGRESS NOTES
Bedside shift change report given to DONALD Graf (oncoming nurse) by DONALD Celeste (offgoing nurse). Report included the following information Nurse Handoff Report, Recent Results, and Cardiac Rhythm NSR .      End of Shift Note    Bedside shift change report given to DONALD Presley (oncoming nurse) by Lesia Marx RN (offgoing nurse).  Report included the following information SBAR, Kardex, Recent Results, Cardiac Rhythm NSR, and Quality Measures    Shift worked:  Day shift     Shift summary and any significant changes:     Patient up ad tamia. Oxygen weaned off and oxygen saturation mid 90's. Per MD, patient to stay another night to make sure INR stable before discharge.  Patient up in chair for several hours today.   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   Zone phone for oncoming shift:   [phone]       Activity:     Number times ambulated in hallways past shift: 0  Number of times OOB to chair past shift: 1    Cardiac:   Cardiac Monitoring: Yes           Access:  Current line(s): PIV     Genitourinary:   Urinary status: voiding    Respiratory:      Chronic home O2 use?: NO  Incentive spirometer at bedside:        GI:     Current diet:  ADULT DIET; Regular  Passing flatus:   Tolerating current diet: YES       Pain Management:   Patient states pain is manageable on current regimen: YES    Skin:     Interventions: increase time out of bed, limit briefs, and nutritional support    Patient Safety:  Fall Score:    Interventions: gripper socks and pt to call before getting OOB       Length of Stay:  Expected LOS: 8  Actual LOS: 7      Lesia Marx RN

## 2024-06-20 NOTE — PLAN OF CARE
Problem: Discharge Planning  Goal: Discharge to home or other facility with appropriate resources  Outcome: Progressing  Flowsheets  Taken 6/19/2024 1912 by Roula Shane RN  Discharge to home or other facility with appropriate resources:   Identify barriers to discharge with patient and caregiver   Arrange for needed discharge resources and transportation as appropriate  Taken 6/19/2024 0800 by Boubacar Meyer RN  Discharge to home or other facility with appropriate resources:   Identify barriers to discharge with patient and caregiver   Arrange for needed discharge resources and transportation as appropriate   Identify discharge learning needs (meds, wound care, etc)     Problem: Pain  Goal: Verbalizes/displays adequate comfort level or baseline comfort level  Outcome: Progressing     Problem: ABCDS Injury Assessment  Goal: Absence of physical injury  Outcome: Progressing     Problem: Respiratory - Adult  Goal: Achieves optimal ventilation and oxygenation  6/19/2024 2151 by Roula Shane RN  Outcome: Progressing  6/19/2024 1328 by Nadine Payne RCP  Outcome: Progressing  Flowsheets  Taken 6/19/2024 0800 by Boubacar Meyer RN  Achieves optimal ventilation and oxygenation:   Assess for changes in respiratory status   Assess for changes in mentation and behavior   Assess the need for suctioning and aspirate as needed   Assess and instruct to report shortness of breath or any respiratory difficulty   Respiratory therapy support as indicated  Taken 6/19/2024 0030 by Marni Bennett RCP  Achieves optimal ventilation and oxygenation: Respiratory therapy support as indicated     Problem: Occupational Therapy - Adult  Goal: By Discharge: Performs self-care activities at highest level of function for planned discharge setting.  See evaluation for individualized goals.  Description: FUNCTIONAL STATUS PRIOR TO ADMISSION:  pt w/ hx of chronic PE since 2018 and within last ~2 weeks has stated increased general

## 2024-06-20 NOTE — PROGRESS NOTES
Pharmacist Daily Dosing of Warfarin    Indication & Goal INR: PE, INR Goal 2-3    PTA Warfarin Dose: new start    Notable concurrent conditions and medications: therapeutic enoxaparin    Labs:  Recent Labs     Units 06/20/24  0348 06/19/24  0342 06/18/24  0416   INR   2.1* 1.3* 1.3*   HGB g/dL 8.2* 7.9* 7.7*   PLT K/uL 310 293 277         Impression/Plan:   Noted significant increase in INR today  HOLD today's dose because INR is anticipated to increase further  Continue therapeutic enoxaparin for now; consider discontinuing when INR is therapeutic x 2  Daily INR - ordered until 7/6  CBC w/o differential every other day - ordered until 6/23     Pharmacy will follow daily and adjust the dose as appropriate.    Thank you,  Cash Grimm [facility]      Warfarin Protocol    Located on pharmacy Teams site: Clinical Practice -> Anticoagulation & Cardiology -> Anticoagulation Policies, Protocols, Guidance

## 2024-06-20 NOTE — PROGRESS NOTES
Hospitalist Progress Note    NAME:   Diana Snell   : 1990   MRN: [id number]     Date/Time: 2024 4:10 PM  Patient PCP: Sonya Abreu MD    Estimated discharge date:   Barriers: Ongoing INR therapeutic x2       Assessment / Plan:  Acute hypoxic respiratory failure  Acute pulmonary embolism     cxr suspected new rt airspace disease/pna vs pulmonary infarct  cta chest + subacute to chronic pulmonary emboli in upper and lower lobes  Ct a/p neg acute infectious processes or findings, has IUD in uterus as expected w/o complications  Echo:Normal left ventricular systolic function with a visually estimated EF of 55 - 60%. Left ventricle size is normal. Mildly increased wall thickness. Normal wall motion.    Right Ventricle: Right ventricle is moderately dilated. Mildly reduced systolic function.    Tricuspid Valve: The estimated RVSP is 35 mmHg.    Right Atrium: Right atrium is moderately dilated.  Covid and flu negative  Procalcitonin <0.05  Pulmonary evaluated  Per hematology, hypercoagulable workup was negative and they recommended warfarin due to cost issues  Patient was not taking Xarelto due to issues with cost     -S/p heparin drip   -started on full dose Lovenox on  warfarin.  INR is 1.3.  Check INR daily.  Pharmacy managing.    She is currently on 2 L nasal cannula and wean off as tolerated  Seen by PT OT and recommended IPR placement.  Case management consulted.  She does not have a clear source so placement will be difficult.     Mildly elevated troponin of uncertain cause, probable recurrent pulmonary emboli   - Per cardiology no workup needed for troponin elevation as these are related to pulmonary embolism.Continue with warfarin      Mild acute hyponatremia, asymptomatic- resolved   -Sodium is improved and is 136      T2DM- well controlled   Hemoglobin A1c 5.5  She does not have diabetes, sliding scale stopped     Chronic anemia, hgb stable, w/o acute active bleeding, states  EXAM:  General: Alert, cooperative  EENT:  EOMI. Anicteric sclerae.  Resp:  CTA bilaterally, no wheezing or rales.  No accessory muscle use  CV:  Regular  rhythm,  No edema  GI:  Soft, Non distended, Non tender.  +Bowel sounds  Neurologic:  Alert and oriented X 3, normal speech,   Psych:   Good insight. Not anxious nor agitated  Skin:  No rashes.  No jaundice    Reviewed most current lab test results and cultures  YES  Reviewed most current radiology test results   YES  Review and summation of old records today    NO  Reviewed patient's current orders and MAR    YES  PMH/SH reviewed - no change compared to H&P    Procedures: see electronic medical records for all procedures/Xrays and details which were not copied into this note but were reviewed prior to creation of Plan.      LABS:  I reviewed today's most current labs and imaging studies.  Pertinent labs include:  Recent Labs     06/18/24 0416 06/19/24 0342 06/20/24  0348   WBC 14.1* 14.6* 13.8*   HGB 7.7* 7.9* 8.2*   HCT 27.5* 27.4* 28.7*    293 310       Recent Labs     06/18/24 0416 06/19/24  0342 06/20/24  0348   * 136 135*   K 3.7 3.8 4.1    106 105   CO2 26 25 26   GLUCOSE 104* 93 87   BUN 5* 5* 6   CREATININE 0.57 0.53* 0.55   CALCIUM 9.2 9.1 9.5   INR 1.3* 1.3* 2.1*         Signed: Hector Cortez MD

## 2024-06-20 NOTE — PLAN OF CARE
Problem: Discharge Planning  Goal: Discharge to home or other facility with appropriate resources  6/20/2024 1136 by Lesia Marx RN  Outcome: Progressing  6/19/2024 2151 by Roula Shane RN  Outcome: Progressing  Flowsheets  Taken 6/19/2024 1912 by Roula Shane RN  Discharge to home or other facility with appropriate resources:   Identify barriers to discharge with patient and caregiver   Arrange for needed discharge resources and transportation as appropriate  Taken 6/19/2024 0800 by Boubacar Meyer RN  Discharge to home or other facility with appropriate resources:   Identify barriers to discharge with patient and caregiver   Arrange for needed discharge resources and transportation as appropriate   Identify discharge learning needs (meds, wound care, etc)     Problem: Pain  Goal: Verbalizes/displays adequate comfort level or baseline comfort level  6/20/2024 1136 by Lesia Marx RN  Outcome: Progressing  6/19/2024 2151 by Roula Shane RN  Outcome: Progressing     Problem: ABCDS Injury Assessment  Goal: Absence of physical injury  6/20/2024 1136 by Lesia Marx RN  Outcome: Progressing  6/19/2024 2151 by Roula Shane RN  Outcome: Progressing     Problem: Respiratory - Adult  Goal: Achieves optimal ventilation and oxygenation  6/20/2024 1136 by Lesia Marx RN  Outcome: Progressing  6/20/2024 1038 by iTff Branch,   Outcome: Progressing  6/19/2024 2211 by Mya Adams RT  Outcome: Progressing  6/19/2024 2151 by Roula Shane RN  Outcome: Progressing     Problem: Safety - Adult  Goal: Free from fall injury  Outcome: Progressing     Problem: Hematologic - Adult  Goal: Maintains hematologic stability  Outcome: Progressing     Problem: Cardiovascular - Adult  Goal: Maintains optimal cardiac output and hemodynamic stability  Outcome: Progressing  Goal: Absence of cardiac dysrhythmias or at baseline  Outcome: Progressing     Problem: Gastrointestinal -

## 2024-06-21 VITALS
WEIGHT: 243.83 LBS | HEIGHT: 67 IN | DIASTOLIC BLOOD PRESSURE: 78 MMHG | SYSTOLIC BLOOD PRESSURE: 123 MMHG | RESPIRATION RATE: 14 BRPM | HEART RATE: 97 BPM | OXYGEN SATURATION: 91 % | BODY MASS INDEX: 38.27 KG/M2 | TEMPERATURE: 97.7 F

## 2024-06-21 LAB
ANION GAP SERPL CALC-SCNC: 5 MMOL/L (ref 5–15)
BUN SERPL-MCNC: 7 MG/DL (ref 6–20)
BUN/CREAT SERPL: 13 (ref 12–20)
CALCIUM SERPL-MCNC: 9.3 MG/DL (ref 8.5–10.1)
CHLORIDE SERPL-SCNC: 104 MMOL/L (ref 97–108)
CO2 SERPL-SCNC: 26 MMOL/L (ref 21–32)
CREAT SERPL-MCNC: 0.54 MG/DL (ref 0.55–1.02)
ERYTHROCYTE [DISTWIDTH] IN BLOOD BY AUTOMATED COUNT: 22.3 % (ref 11.5–14.5)
GLUCOSE SERPL-MCNC: 108 MG/DL (ref 65–100)
HCT VFR BLD AUTO: 28.9 % (ref 35–47)
HGB BLD-MCNC: 8.4 G/DL (ref 11.5–16)
INR PPP: 2.1 (ref 0.9–1.1)
MCH RBC QN AUTO: 22.4 PG (ref 26–34)
MCHC RBC AUTO-ENTMCNC: 29.1 G/DL (ref 30–36.5)
MCV RBC AUTO: 77.1 FL (ref 80–99)
NRBC # BLD: 0.19 K/UL (ref 0–0.01)
NRBC BLD-RTO: 1.3 PER 100 WBC
PLATELET # BLD AUTO: 323 K/UL (ref 150–400)
PMV BLD AUTO: 10.3 FL (ref 8.9–12.9)
POTASSIUM SERPL-SCNC: 3.6 MMOL/L (ref 3.5–5.1)
PROTHROMBIN TIME: 20.7 SEC (ref 9–11.1)
RBC # BLD AUTO: 3.75 M/UL (ref 3.8–5.2)
SODIUM SERPL-SCNC: 135 MMOL/L (ref 136–145)
WBC # BLD AUTO: 14.6 K/UL (ref 3.6–11)

## 2024-06-21 PROCEDURE — 6370000000 HC RX 637 (ALT 250 FOR IP): Performed by: NURSE PRACTITIONER

## 2024-06-21 PROCEDURE — 6370000000 HC RX 637 (ALT 250 FOR IP): Performed by: STUDENT IN AN ORGANIZED HEALTH CARE EDUCATION/TRAINING PROGRAM

## 2024-06-21 PROCEDURE — 6370000000 HC RX 637 (ALT 250 FOR IP): Performed by: INTERNAL MEDICINE

## 2024-06-21 PROCEDURE — 94660 CPAP INITIATION&MGMT: CPT

## 2024-06-21 PROCEDURE — 85027 COMPLETE CBC AUTOMATED: CPT

## 2024-06-21 PROCEDURE — 36415 COLL VENOUS BLD VENIPUNCTURE: CPT

## 2024-06-21 PROCEDURE — 94640 AIRWAY INHALATION TREATMENT: CPT

## 2024-06-21 PROCEDURE — 6360000002 HC RX W HCPCS: Performed by: STUDENT IN AN ORGANIZED HEALTH CARE EDUCATION/TRAINING PROGRAM

## 2024-06-21 PROCEDURE — 2580000003 HC RX 258: Performed by: NURSE PRACTITIONER

## 2024-06-21 PROCEDURE — 85610 PROTHROMBIN TIME: CPT

## 2024-06-21 PROCEDURE — 80048 BASIC METABOLIC PNL TOTAL CA: CPT

## 2024-06-21 RX ORDER — WARFARIN SODIUM 5 MG/1
TABLET ORAL
Qty: 10 TABLET | Refills: 0 | Status: SHIPPED | OUTPATIENT
Start: 2024-06-21 | End: 2024-06-21

## 2024-06-21 RX ORDER — METOPROLOL SUCCINATE 25 MG/1
25 TABLET, EXTENDED RELEASE ORAL DAILY
Qty: 30 TABLET | Refills: 3 | Status: SHIPPED | OUTPATIENT
Start: 2024-06-22

## 2024-06-21 RX ORDER — WARFARIN SODIUM 5 MG/1
10 TABLET ORAL
Status: COMPLETED | OUTPATIENT
Start: 2024-06-21 | End: 2024-06-21

## 2024-06-21 RX ORDER — WARFARIN SODIUM 5 MG/1
7.5 TABLET ORAL
Status: DISCONTINUED | OUTPATIENT
Start: 2024-06-21 | End: 2024-06-21

## 2024-06-21 RX ORDER — WARFARIN SODIUM 5 MG/1
10 TABLET ORAL DAILY
Qty: 10 TABLET | Refills: 0 | Status: SHIPPED | OUTPATIENT
Start: 2024-06-21

## 2024-06-21 RX ORDER — WARFARIN SODIUM 5 MG/1
10 TABLET ORAL
Status: DISCONTINUED | OUTPATIENT
Start: 2024-06-21 | End: 2024-06-21

## 2024-06-21 RX ADMIN — METOPROLOL SUCCINATE 25 MG: 25 TABLET, EXTENDED RELEASE ORAL at 09:00

## 2024-06-21 RX ADMIN — Medication: at 09:06

## 2024-06-21 RX ADMIN — IPRATROPIUM BROMIDE AND ALBUTEROL SULFATE 1 DOSE: .5; 3 SOLUTION RESPIRATORY (INHALATION) at 09:43

## 2024-06-21 RX ADMIN — WARFARIN SODIUM 10 MG: 5 TABLET ORAL at 11:57

## 2024-06-21 RX ADMIN — ENOXAPARIN SODIUM 120 MG: 150 INJECTION SUBCUTANEOUS at 09:01

## 2024-06-21 RX ADMIN — FERROUS SULFATE TAB 325 MG (65 MG ELEMENTAL FE) 325 MG: 325 (65 FE) TAB at 09:00

## 2024-06-21 RX ADMIN — GUAIFENESIN 600 MG: 600 TABLET, EXTENDED RELEASE ORAL at 09:00

## 2024-06-21 RX ADMIN — SODIUM CHLORIDE, PRESERVATIVE FREE 10 ML: 5 INJECTION INTRAVENOUS at 08:55

## 2024-06-21 NOTE — CARE COORDINATION
Transition of Care Plan:    RUR: 14% (Moderate RUR)  Prior Level of Functioning: Needs assistance with ADLS/IADLS  Disposition:  Home with follow up   If SNF or IPR: Date FOC offered: 06/17/18 (no longer needed)  Date FOC received: 06/18  Accepting facility: Pending  Date authorization started with reference number:   Date authorization received and expires:   Follow up appointments: pcp/specialist   DME needed: none  Transportation at discharge: The patient's spouse, Demi Sarmiento,([employer]) [phone]   IM/IMM Medicare/ letter given: na  Is patient a  and connected with VA? na   If yes, was [place] transfer form completed and VA notified? na  Caregiver Contact: Demi Sarmiento,(Ladyienminnie) [phone]   Discharge Caregiver contacted prior to discharge?  contacted  Care Conference needed? no  Barriers to discharge: none       06/21/24 1048   Services At/After Discharge   Transition of Care Consult (CM Consult) Discharge Planning;Other  ([facility] INR checks and pcp)   Services At/After Discharge Nursing services;Outpatient care transitions  ([facility] INR checks and pcp)   Mode of Transport at Discharge Other (see comment)  (rosa Sarmiento,(Ladyienminnie) [phone])   Confirm Follow Up Transport Self         The  (CESAR) met with the patient, who stated no needs or concerns and agrees with the discharge plan. The patient's family will provide a ride. The primary registered nurse (RN) has been informed that the patient is cleared from the case management perspective.    CM noted no further needs from case management perspective related to her discharge needs.     CESAR facilitated a connection with [facility] who confirmed their ability to provide INR checks and have their provider follow up the medication management. CESAR scheduled an appointment for the patient on Wednesday, the 26th, at 1 p.m. CESAR also provided the patient with the  necessary financial application, primary care provider application, and detailed information about the services, including anticipated fees and total costs, address, website, and phone number, ensuring the patient's confirmation of financial capability to cover them.        The patient is alert, able to communicate  needs effectively,  independent in ADLS/IADLS, and able to drive. She currently resides with her boyfriend in an apartment with 15 steps to enter, Demi Sarmiento,(Boyfriend) [phone] and also has three children, ages 17, 14, and 6. She has a CPAP and a nebulizer.  The patient's boyfriend will provide a ride.         Vicky Shane RN  Case Management  [phone]

## 2024-06-21 NOTE — PROGRESS NOTES

## 2024-06-21 NOTE — DISCHARGE INSTRUCTIONS
Pulmonary Embolism: Care Instructions  Overview     Pulmonary embolism is the sudden blockage of an artery in the lung. Blood clots in the deep veins of the leg or pelvis (deep vein thrombosis, or DVT) are the most common cause. These blood clots can travel to the lungs.  Pulmonary embolism can be very serious. Because you have had one pulmonary embolism, you are at greater risk for having another one. But you can take steps to prevent another pulmonary embolism.  You will probably take a prescription blood-thinning medicine to prevent blood clots. A blood thinner can stop a blood clot from growing larger and prevent new clots from forming.  Follow-up care is a key part of your treatment and safety. Be sure to make and go to all appointments, and call your doctor if you are having problems. It's also a good idea to know your test results and keep a list of the medicines you take.  How can you care for yourself at home?  Take your medicines exactly as prescribed. Call your doctor if you think you are having a problem with your medicine. You will get more details on the specific medicines your doctor prescribes.  If you are taking a blood thinner, be sure you get instructions about how to take your medicine safely. Blood thinners can cause serious bleeding problems.  Try to walk several times a day. Walking helps keep blood moving in your legs. Before doing other types of exercise, ask your doctor what type and level of exercise is safe for you.  Take steps to help prevent blood clots in your legs. For example:  Exercise your lower leg muscles if you sit for long periods of time. Pump your feet up and down by pulling your toes up toward your knees then pointing them down. Repeat.  After an illness or surgery, try to get up and out of bed often. If you can't get out of bed, flex your feet every hour to keep the blood moving through your legs.  Take plenty of breaks when you travel. On long car trips, stop the car

## 2024-06-21 NOTE — DISCHARGE SUMMARY
Discharge Summary    Name: Diana Snell  [id number]  YOB: 1990 (Age: 33 y.o.)   Date of Admission: 6/13/2024  Date of Discharge: 6/21/2024  Attending Physician: Hector Cortez MD    Discharge Diagnosis:   Acute hypoxic respiratory failure  Acute pulmonary embolism  Mildly elevated troponin of uncertain cause, probable recurrent pulmonary emboli   Mild acute hyponatremia, asymptomatic- resolved   T2DM- well controlled   Chronic anemia, hgb stable, w/o acute active bleeding, states known menorrhagia   Chronic leukocytosis     Consultations:  IP CONSULT TO CARDIOLOGY  IP CONSULT TO CASE MANAGEMENT  IP CONSULT TO SOCIAL WORK  IP CONSULT TO PULMONOLOGY  IP CONSULT TO HEMATOLOGY  IP CONSULT TO PHARMACY      Brief Admission History/Reason for Admission Per Edgardo Moncada MD:     Diana Snell is a 33 y.o.  female with PMHx significant for Chronic pulmonary emboli 2018 on chronic xarelto, h/o ovarian cancer, h/o HTN with pregnancy, JAIME 1 w/last pap, asthma, anemia chronic, h/o depression no longer on meds, h/o TOSHIA on cpap at home, menorrhagia has IUD in place.      Patient presented to the ER with worsening dyspnea and chest pain, states her insurance ran out and she has not been on meds for the past couple of weeks, last dose of xarelto 6/04 or 06/05. States her new insurance supplement medicaid only pays for birth control, which she already has an IUD in place. Patient is reported to have recently diagnosed with PE at [facility], but has known chronic PE since 2018 and on chronic Xarelto until beginning of this month. Pt pain on exam is reported to be right sided and radiates to the right axillary area, worse/reproducible with inspiration, presently moderate pain reported on exam, not reproducible with palpation, but is reproducible with deep inspiration on auscultation of the lungs. Pt denies any other complaints or concerns, states she went to an urgent care  tablet       Information about where to get these medications is not yet available    Ask your nurse or doctor about these medications  warfarin 5 MG tablet             DISPOSITION:    Home with Family:       Home with HH/PT/OT/RN:    SNF/LTC:    JONES:    OTHER:            Code status:   Recommended diet: regular diet  Recommended activity: activity as tolerated  Wound care: See surgical/procedure care instructions      Follow up with:   PCP : Sonya Abreu MD    Department of :  [address] [place] [address] [phone]  Follow up  ? Please consider calling or visiting your local Department of . They can provide valuable information about resources and assistance programs available in your community.    [facility]  Phone: [phone]  Address: [place] [address]  Schedule an appointment as soon as possible for a visit on 6/26/2024  This is your INR clinic; 1 p.m    Sonya Abreu MD  [address] [place] [address]  [phone]    Follow up      Sonya Abreu MD  [address] [place] [address] [address]  [facility] [address]  [phone]          Robin Lyn MD  [address]  [address]  [facility] [address]  [phone]    Follow up in 1 week(s)      Bry Lara MD  [address]  [id number]  [facility] [address]  [phone]    Follow up in 2 week(s)      Eleni Mancuso, APRN - NP  [address] [facility]  [address]  [facility] [address]  [phone]    Follow up in 2 week(s)            Total time in minutes spent coordinating this discharge (includes going over instructions, follow-up, prescriptions, and preparing report for sign off to her PCP) :  35 minutes

## 2024-06-21 NOTE — PROGRESS NOTES
Pharmacist Daily Dosing of Warfarin    Indication & Goal INR: PE, INR Goal 2-3    PTA Warfarin Dose: new start    Notable concurrent conditions and medications: therapeutic enoxaparin    Labs:  Recent Labs     Units 06/21/24  0308 06/20/24  0348 06/19/24  0342   INR   2.1* 2.1* 1.3*   HGB g/dL 8.4* 8.2* 7.9*   PLT K/uL 323 310 293         Impression/Plan:   INR stable today (therapeutic x 2)  Give warfarin 10 mg x 1 today  Discontinue therapeutic enoxaparin  Daily INR - ordered until 7/6  CBC w/o differential every other day - ordered until 6/23  Case management arranged follow-up with [facility] for INR monitoring  Discharge on warfarin 10 mg daily     Pharmacy will follow daily and adjust the dose as appropriate.    Thank you,  Cash Grimm [facility]      Warfarin Protocol    Located on pharmacy Teams site: Clinical Practice -> Anticoagulation & Cardiology -> Anticoagulation Policies, Protocols, Guidance

## 2024-06-21 NOTE — PROGRESS NOTES
Bedside shift change report given to DONALD Jackson (oncoming nurse) by DONALD Presley (offgoing nurse). Report included the following information Nurse Handoff Report.

## 2024-06-21 NOTE — PLAN OF CARE
Problem: Pain  Goal: Verbalizes/displays adequate comfort level or baseline comfort level  Outcome: Progressing     Problem: ABCDS Injury Assessment  Goal: Absence of physical injury  Outcome: Progressing     Problem: Respiratory - Adult  Goal: Achieves optimal ventilation and oxygenation  6/21/2024 1227 by Araceli Camacho RN  Outcome: Progressing  6/21/2024 0945 by Ap Merritt, RT  Outcome: Progressing  6/21/2024 0240 by Marni Bennett, CHASITY  Outcome: Progressing  Flowsheets  Taken 6/21/2024 0015  Achieves optimal ventilation and oxygenation:   Assess for changes in respiratory status   Respiratory therapy support as indicated  Taken 6/20/2024 2008  Achieves optimal ventilation and oxygenation:   Assess for changes in respiratory status   Respiratory therapy support as indicated   Assess and instruct to report shortness of breath or any respiratory difficulty     Problem: Safety - Adult  Goal: Free from fall injury  Outcome: Progressing     Problem: Hematologic - Adult  Goal: Maintains hematologic stability  Outcome: Progressing     Problem: Cardiovascular - Adult  Goal: Maintains optimal cardiac output and hemodynamic stability  Outcome: Progressing     Problem: Gastrointestinal - Adult  Goal: Minimal or absence of nausea and vomiting  Outcome: Progressing

## 2024-06-21 NOTE — PROGRESS NOTES
6/21/2024        RE: Diana Hiseville         [facility] [address]          To Whom It May Concern,      Due to medical reasons, Diana Hiseville   should remain out of work until clear by PCP next week         Sincerely,          Hector Cortez MD

## 2024-06-21 NOTE — PLAN OF CARE
Problem: Discharge Planning  Goal: Discharge to home or other facility with appropriate resources  6/20/2024 2221 by Sakina Salcedo RN  Outcome: Progressing  6/20/2024 1136 by Lesia Marx RN  Outcome: Progressing     Problem: Pain  Goal: Verbalizes/displays adequate comfort level or baseline comfort level  6/20/2024 2221 by Sakina Salcedo RN  Outcome: Progressing  6/20/2024 1136 by Lesia Marx RN  Outcome: Progressing    Outcome: Progressing       Problem: Skin/Tissue Integrity - Adult  Goal: Skin integrity remains intact  6/20/2024 1136 by Lesia Marx RN  Outcome: Progressing  Flowsheets (Taken 6/20/2024 0758)  Skin Integrity Remains Intact: Monitor for areas of redness and/or skin breakdown     Problem: Skin/Tissue Integrity  Goal: Absence of new skin breakdown  Description: 1.  Monitor for areas of redness and/or skin breakdown  2.  Assess vascular access sites hourly  3.  Every 4-6 hours minimum:  Change oxygen saturation probe site  4.  Every 4-6 hours:  If on nasal continuous positive airway pressure, respiratory therapy assess nares and determine need for appliance change or resting period.  6/20/2024 2221 by Sakina Salcedo RN  Outcome: Progressing  6/20/2024 1136 by Lesia Marx RN  Outcome: Progressing

## 2024-06-21 NOTE — PROGRESS NOTES
1900h: Bedside shift change report given to SAKINA BENNETT (oncoming nurse) by VIANNEY BENNETT (offgoing nurse). Report included the following information Nurse Handoff Report, Intake/Output, MAR, Recent Results, Med Rec Status, and Cardiac Rhythm SINUS RHYTHM .      End of Shift Note    Bedside shift change report given to MARY BENNETT (oncoming nurse) by Sakina Salcedo RN (offgoing nurse).  Report included the following information SBAR, Intake/Output, MAR, Recent Results, Med Rec Status, and Cardiac Rhythm SINUS RHYTHM    Shift worked:  1900H-0730H     Shift summary and any significant changes:     Cpap worn overnight; no acute changes overnight; INR 2.1.     Concerns for physician to address:       Zone phone for oncoming shift:          Activity:     Number times ambulated in hallways past shift: 0  Number of times OOB to chair past shift: 0    Cardiac:   Cardiac Monitoring: Yes           Access:  Current line(s): PIV     Genitourinary:   Urinary status: voiding    Respiratory:      Chronic home O2 use?: NO  Incentive spirometer at bedside: N/A       GI:     Current diet:  ADULT DIET; Regular  Passing flatus: YES  Tolerating current diet: YES       Pain Management:   Patient states pain is manageable on current regimen: YES    Skin:     Interventions: increase time out of bed    Patient Safety:  Fall Score:    Interventions: bed/chair alarm, assistive device (walker, cane. etc), gripper socks, and pt to call before getting OOB       Length of Stay:  Expected LOS: 8  Actual LOS: 8      Sakina Salcedo RN

## 2024-10-28 ENCOUNTER — OFFICE VISIT (OUTPATIENT)
Facility: CLINIC | Age: 34
End: 2024-10-28

## 2024-10-28 VITALS
SYSTOLIC BLOOD PRESSURE: 127 MMHG | RESPIRATION RATE: 17 BRPM | DIASTOLIC BLOOD PRESSURE: 88 MMHG | OXYGEN SATURATION: 96 % | HEIGHT: 66 IN | WEIGHT: 246.7 LBS | BODY MASS INDEX: 39.65 KG/M2 | TEMPERATURE: 98.2 F | HEART RATE: 110 BPM

## 2024-10-28 DIAGNOSIS — D50.0 IRON DEFICIENCY ANEMIA DUE TO CHRONIC BLOOD LOSS: ICD-10-CM

## 2024-10-28 DIAGNOSIS — Z91.199 PATIENT NONCOMPLIANCE: ICD-10-CM

## 2024-10-28 DIAGNOSIS — Z79.01 LONG TERM (CURRENT) USE OF ANTICOAGULANTS: ICD-10-CM

## 2024-10-28 DIAGNOSIS — I82.409 RECURRENT ACUTE DEEP VEIN THROMBOSIS (DVT) OF LOWER EXTREMITY, UNSPECIFIED LATERALITY (HCC): Primary | ICD-10-CM

## 2024-10-28 DIAGNOSIS — I26.99 RECURRENT PULMONARY EMBOLI (HCC): ICD-10-CM

## 2024-10-28 RX ORDER — FERROUS SULFATE 325(65) MG
325 TABLET, DELAYED RELEASE (ENTERIC COATED) ORAL
Qty: 90 TABLET | Refills: 3 | Status: SHIPPED | OUTPATIENT
Start: 2024-10-28

## 2024-10-28 RX ORDER — ALBUTEROL SULFATE 90 UG/1
2 INHALANT RESPIRATORY (INHALATION) 4 TIMES DAILY PRN
Qty: 18 G | Refills: 3 | Status: SHIPPED | OUTPATIENT
Start: 2024-10-28

## 2024-10-28 ASSESSMENT — ANXIETY QUESTIONNAIRES
2. NOT BEING ABLE TO STOP OR CONTROL WORRYING: NOT AT ALL
1. FEELING NERVOUS, ANXIOUS, OR ON EDGE: NOT AT ALL
IF YOU CHECKED OFF ANY PROBLEMS ON THIS QUESTIONNAIRE, HOW DIFFICULT HAVE THESE PROBLEMS MADE IT FOR YOU TO DO YOUR WORK, TAKE CARE OF THINGS AT HOME, OR GET ALONG WITH OTHER PEOPLE: NOT DIFFICULT AT ALL
4. TROUBLE RELAXING: NOT AT ALL
6. BECOMING EASILY ANNOYED OR IRRITABLE: NOT AT ALL
5. BEING SO RESTLESS THAT IT IS HARD TO SIT STILL: NOT AT ALL
3. WORRYING TOO MUCH ABOUT DIFFERENT THINGS: NOT AT ALL
7. FEELING AFRAID AS IF SOMETHING AWFUL MIGHT HAPPEN: NOT AT ALL
GAD7 TOTAL SCORE: 0

## 2024-10-28 ASSESSMENT — PATIENT HEALTH QUESTIONNAIRE - PHQ9
1. LITTLE INTEREST OR PLEASURE IN DOING THINGS: NOT AT ALL
SUM OF ALL RESPONSES TO PHQ QUESTIONS 1-9: 0
SUM OF ALL RESPONSES TO PHQ9 QUESTIONS 1 & 2: 0
2. FEELING DOWN, DEPRESSED OR HOPELESS: NOT AT ALL

## 2024-10-28 NOTE — PROGRESS NOTES
Chief Complaint   Patient presents with    Medication Refill     \"Have you been to the ER, urgent care clinic since your last visit?  Hospitalized since your last visit?\"    NO    “Have you seen or consulted any other health care providers outside our system since your last visit?”    NO     “Have you had a pap smear?”    NO    Date of last Cervical Cancer screen (HPV or PAP): 5/7/2021            HIPPA confirmed by two patient identifiers.    
Vitals  Vitals:    10/28/24 1154   BP: 127/88   Pulse: (!) 110   Resp: 17   Temp: 98.2 °F (36.8 °C)   SpO2: 96%        Physical Exam:   General appearance - alert, well appearing, and in no distress   Mental status - alert, oriented to person, place, and time  EYE-EOMI  Neck - supple,   Chest - symmetric air entry    Abdomen - obese  Ext-no pedal edema, no clubbing or cyanosis  Skin-Warm and dry. no hyperpigmentation, vitiligo, or suspicious lesions  Neuro -alert, oriented, normal speech, no focal findings or movement disorder noted        Assessment/Plan:       Diagnosis Orders   1. Recurrent acute deep vein thrombosis (DVT) of lower extremity, unspecified laterality (HCC)  apixaban (ELIQUIS) 5 MG TABS tablet      2. Recurrent pulmonary emboli (HCC)  apixaban (ELIQUIS) 5 MG TABS tablet      3. Long term (current) use of anticoagulants  apixaban (ELIQUIS) 5 MG TABS tablet      4. Iron deficiency anemia due to chronic blood loss  [facility] ([address])    ferrous sulfate (FE TABS 325) 325 (65 Fe) MG EC tablet      5. Patient noncompliance            -refill eliquis  -hold metoprolol for now  -refer to heme for ? Iron infusions  -d/w pt risk of dealth and noncompliance  -f/up PE and bp check    I have reviewed with the patient details of the assessment and plan and all questions were answered. Relevent patient education was performed.The most recent lab findings were reviewed with the patient.    An After Visit Summary was printed and given to the patient.    Return in about 8 weeks (around 12/23/2024) for PE.     Sonya Abreu MD

## 2024-11-10 ENCOUNTER — APPOINTMENT (OUTPATIENT)
Facility: HOSPITAL | Age: 34
End: 2024-11-10
Payer: COMMERCIAL

## 2024-11-10 ENCOUNTER — HOSPITAL ENCOUNTER (EMERGENCY)
Facility: HOSPITAL | Age: 34
Discharge: HOME | End: 2024-11-12
Payer: COMMERCIAL

## 2024-11-10 ENCOUNTER — HOSPITAL ENCOUNTER (EMERGENCY)
Facility: HOSPITAL | Age: 34
Discharge: HOME | End: 2024-11-10
Payer: COMMERCIAL

## 2024-11-10 VITALS
DIASTOLIC BLOOD PRESSURE: 91 MMHG | SYSTOLIC BLOOD PRESSURE: 143 MMHG | WEIGHT: 247 LBS | RESPIRATION RATE: 19 BRPM | HEIGHT: 66 IN | OXYGEN SATURATION: 99 % | HEART RATE: 74 BPM | BODY MASS INDEX: 39.7 KG/M2 | TEMPERATURE: 97.1 F

## 2024-11-10 DIAGNOSIS — M25.561 ACUTE PAIN OF RIGHT KNEE: Primary | ICD-10-CM

## 2024-11-10 PROCEDURE — 6370000000 HC RX 637 (ALT 250 FOR IP)

## 2024-11-10 PROCEDURE — 73564 X-RAY EXAM KNEE 4 OR MORE: CPT

## 2024-11-10 PROCEDURE — 93971 EXTREMITY STUDY: CPT

## 2024-11-10 PROCEDURE — 99284 EMERGENCY DEPT VISIT MOD MDM: CPT

## 2024-11-10 RX ORDER — ACETAMINOPHEN 500 MG
1000 TABLET ORAL
Status: COMPLETED | OUTPATIENT
Start: 2024-11-10 | End: 2024-11-10

## 2024-11-10 RX ORDER — ACETAMINOPHEN 500 MG
1000 TABLET ORAL EVERY 6 HOURS PRN
Qty: 30 TABLET | Refills: 0 | Status: SHIPPED | OUTPATIENT
Start: 2024-11-10

## 2024-11-10 RX ADMIN — ACETAMINOPHEN 1000 MG: 500 TABLET ORAL at 10:10

## 2024-11-10 ASSESSMENT — PAIN DESCRIPTION - PAIN TYPE: TYPE: ACUTE PAIN

## 2024-11-10 ASSESSMENT — PAIN DESCRIPTION - ORIENTATION: ORIENTATION: RIGHT

## 2024-11-10 ASSESSMENT — PAIN SCALES - GENERAL: PAINLEVEL_OUTOF10: 6

## 2024-11-10 ASSESSMENT — PAIN DESCRIPTION - LOCATION: LOCATION: KNEE

## 2024-11-10 ASSESSMENT — PAIN - FUNCTIONAL ASSESSMENT: PAIN_FUNCTIONAL_ASSESSMENT: 0-10

## 2024-11-10 ASSESSMENT — PAIN DESCRIPTION - DESCRIPTORS: DESCRIPTORS: PRESSURE

## 2024-11-10 NOTE — ED NOTES
Discharge instructions were given to the patient by MONICA SOLIS RN.     The patient left the Emergency Department alert and oriented and in no acute distress with 1 prescriptions. The patient was encouraged to call or return to the ED for worsening issues or problems and was encouraged to schedule a follow up appointment for continuing care.     Ambulation assessment completed before discharge.  Pt left Emergency Department ambulating at baseline with no ortho devices  Ortho device education: none    The patient verbalized understanding of discharge instructions and prescriptions, all questions were answered. The patient has no further concerns at this time.

## 2024-11-10 NOTE — ED PROVIDER NOTES
[facility] EMERGENCY DEPT  EMERGENCY DEPARTMENT ENCOUNTER       Pt Name: Diana Snell  MRN: [id number]  Birthdate 1990  Date of evaluation: 11/10/2024  Provider: Sade Boston PA-C   PCP: Sonya Abreu MD  Note Started: 10:08 AM EST 11/10/24     CHIEF COMPLAINT       Chief Complaint   Patient presents with    Knee Pain     Per pt reports right knee swelling and pain that started yesterday, denies recent injury. \"I have a history of blood clots in both of my legs and I want to make sure that's not it.\"         HISTORY OF PRESENT ILLNESS: 1 or more elements      History From: Patient  HPI Limitations: None     Diana Snell is a 34 y.o. female with past medical history hypertension, MS, ovarian cancer, history of DVT currently on Eliquis who presents complaining of right knee pain x 2 days.  Patient reports that she has pain in the anterior superior portion of her right knee.  Patient reports that she has a history of blood clots in her right lower extremity and she is concerned.  She reports that she recently restarted her Eliquis a few days ago, she was off for approximately a month due to her insurance changing.  She reports that she feels the pain mostly when she is bending her knee or straightening her knee.  Patient denies any known injury.  She reports that she has been more active over the last month as her mother moved in with her and she has been on her feet caring for her.  She denies any known injury, she denies any traumatic fall.  She denies any extremity numbness, extremity weakness, extremity tingling.  She denies any fever, chills, nausea, vomiting.      Nursing Notes were all reviewed and agreed with or any disagreements were addressed in the HPI.     REVIEW OF SYSTEMS      Review of Systems     Positives and Pertinent negatives as per HPI.    PAST HISTORY     Past Medical History:  Past Medical History:   Diagnosis Date    Cancer (HCC)     ovarian     Hypertension     with pregnancy

## 2024-11-10 NOTE — ED NOTES
Noted that  attempted to call on-call vascular tech twice with no response and no call back at this time. Charge nurse made aware.

## 2024-11-10 NOTE — ED NOTES
Pt presents to ED complaining of right knee pain and swelling. Pt denies injury/falls/abuse. Pt denies taking medications for pain relief. Pt reports hx of blood clots. Pt reports she has not had eloquis x 1 month due to insurance issues. Pt has limited ROM, pulses intact. Pt is alert and oriented x 4, RR even and unlabored, skin is warm and dry. Pt appears in NAD at this time. Assessment completed and pt updated on plan of care.  Call bell in reach.     The Nursing Plan of Care is developed from the Nursing assessment and Emergency Department Attending provider initial evaluation.  The plan of care may be reviewed in the “ED Provider note”.    The Plan of Care was developed with the following considerations:  Patient / Family readiness to learn indicated by:verbalized understanding  Persons(s) to be included in education: patient  Barriers to Learning/Limitations:None    Signed    MONICA SOLIS RN    11/10/2024   9:36 AM

## 2024-11-11 ENCOUNTER — TELEPHONE (OUTPATIENT)
Age: 34
End: 2024-11-11

## 2024-11-11 LAB — ECHO BSA: 2.28 M2

## 2024-11-11 PROCEDURE — 93971 EXTREMITY STUDY: CPT | Performed by: INTERNAL MEDICINE

## 2024-11-12 ENCOUNTER — OFFICE VISIT (OUTPATIENT)
Age: 34
End: 2024-11-12
Payer: COMMERCIAL

## 2024-11-12 VITALS
RESPIRATION RATE: 18 BRPM | SYSTOLIC BLOOD PRESSURE: 140 MMHG | HEART RATE: 75 BPM | HEIGHT: 66 IN | BODY MASS INDEX: 40.79 KG/M2 | WEIGHT: 253.8 LBS | DIASTOLIC BLOOD PRESSURE: 89 MMHG | OXYGEN SATURATION: 99 %

## 2024-11-12 DIAGNOSIS — M25.561 ACUTE PAIN OF RIGHT KNEE: Primary | ICD-10-CM

## 2024-11-12 PROCEDURE — 99204 OFFICE O/P NEW MOD 45 MIN: CPT | Performed by: ORTHOPAEDIC SURGERY

## 2024-11-12 ASSESSMENT — PATIENT HEALTH QUESTIONNAIRE - PHQ9
SUM OF ALL RESPONSES TO PHQ QUESTIONS 1-9: 1
2. FEELING DOWN, DEPRESSED OR HOPELESS: NOT AT ALL
SUM OF ALL RESPONSES TO PHQ9 QUESTIONS 1 & 2: 1
1. LITTLE INTEREST OR PLEASURE IN DOING THINGS: SEVERAL DAYS
SUM OF ALL RESPONSES TO PHQ QUESTIONS 1-9: 1

## 2024-11-12 NOTE — PROGRESS NOTES
Identified pt with two pt identifiers (name and ). Reviewed chart in preparation for visit and have obtained necessary documentation.    Diana Snell is a 34 y.o. female  Chief Complaint   Patient presents with    Follow-Up from Hospital     ED FU-Right Knee, XR in Chart       BP (!) 140/89 (Site: Left Lower Arm, Position: Sitting, Cuff Size: Large Adult)   Pulse 75   Resp 18   Ht 1.676 m (5' 6\")   Wt 115.1 kg (253 lb 12.8 oz)   LMP 10/05/2024 (Approximate)   SpO2 99%   BMI 40.96 kg/m²     1. Have you been to the ER, urgent care clinic since your last visit?  Hospitalized since your last visit?no    2. Have you seen or consulted any other health care providers outside of the [facility] since your last visit?  Include any pap smears or colon screening. No  BP elevated. Patient advised to  follow up with PCP and check BP twice a day at home. Pt stated she has not taken her hypertension medication yet and would not like a 2nd reading.  
0916   BP: (!) 140/89   Pulse: 75   Resp: 18   SpO2: 99%        General: AOX3, no apparent distress  Psychiatric: mood and affect appropriate  Lungs: breathing is symmetric and unlabored bilaterally  Heart: regular rate and rhythm  Abdomen: no guarding  Head: normocephalic, atraumatic  Skin: No significant abnormalities, good turgor  Sensation intact to light touch: L1-S1 dermatomes  Muscular exam: 5/5 strength in all major muscle groups unless noted in specialty exam.    Extremities:      Left upper extremity: Full active and passive range of motion without pain, deformity, no open wound, strength 5/5 in all major muscle groups.    Right upper extremity: Full active and passive range of motion without pain, deformity, no open wound, strength 5/5 in all major muscle groups.    Left lower extremity: Full active and passive range of motion without pain, deformity, no open wound, strength 5/5 in all major muscle groups.    Right lower extremity:  No deformity is noted.  Range of motion of the knee is 0-1 30.   Ligamentous testing of the knee indicates stability of the the ACL, PCL, MCL, LCL.  Lachman's, anterior and posterior drawer tests are specifically negative. Joint line tenderness to palpation negative.  Popliteal area is unremarkable.   No effusion.  Positive tenderness to palpation at the insertion of the quadriceps tendon on the patella, no patellar crepitus.  Patella tracks centrally with a negative apprehension and grind test.  Pivot shift is negative.  Strength testing is indicative of 5/5 strength at hip flexion, extension, knee flexion and extension, tibialis anterior, EHL, and FHL.  Sensation is intact to light touch in the L1-S1 dermatomes.  Capillary refill is less than 2 seconds in the toes.     Diagnostics:    Pertinent Diagnostics:   X-rays ordered in the emergency department and independently reviewed by myself indicate no fractures, dislocations, osseous abnormalities or effusions    Assessment:

## 2024-12-05 ENCOUNTER — OFFICE VISIT (OUTPATIENT)
Age: 34
End: 2024-12-05
Payer: COMMERCIAL

## 2024-12-05 VITALS
HEART RATE: 77 BPM | SYSTOLIC BLOOD PRESSURE: 125 MMHG | HEIGHT: 66 IN | BODY MASS INDEX: 40.27 KG/M2 | DIASTOLIC BLOOD PRESSURE: 85 MMHG | WEIGHT: 250.6 LBS | OXYGEN SATURATION: 98 %

## 2024-12-05 DIAGNOSIS — M25.561 ACUTE PAIN OF RIGHT KNEE: Primary | ICD-10-CM

## 2024-12-05 PROCEDURE — 99213 OFFICE O/P EST LOW 20 MIN: CPT | Performed by: ORTHOPAEDIC SURGERY

## 2024-12-05 ASSESSMENT — PATIENT HEALTH QUESTIONNAIRE - PHQ9
1. LITTLE INTEREST OR PLEASURE IN DOING THINGS: NOT AT ALL
2. FEELING DOWN, DEPRESSED OR HOPELESS: NOT AT ALL
SUM OF ALL RESPONSES TO PHQ QUESTIONS 1-9: 0
SUM OF ALL RESPONSES TO PHQ9 QUESTIONS 1 & 2: 0
SUM OF ALL RESPONSES TO PHQ QUESTIONS 1-9: 0

## 2024-12-05 NOTE — PROGRESS NOTES
Identified pt with two pt identifiers (name and ). Reviewed chart in preparation for visit and have obtained necessary documentation.    Diana Snell is a 34 y.o. female Knee Pain (Right Knee)  .    Vitals:    24 1004   BP: 125/85   Site: Left Upper Arm   Position: Sitting   Cuff Size: Large Adult   Pulse: 77   SpO2: 98%   Weight: 113.7 kg (250 lb 9.6 oz)   Height: 1.676 m (5' 5.98\")          1. Have you been to the ER, urgent care clinic since your last visit?  Hospitalized since your last visit?  yes - ER     2. Have you seen or consulted any other health care providers outside of the [facility] since your last visit?  Include any pap smears or colon screening.  no

## 2024-12-05 NOTE — PROGRESS NOTES
12/5/2024      CC: acute pain left knee    HPI:      This is a 34 y.o. year old female who presents for a follow up visit.  The patient was last seen and diagnosed with left knee pain, quadriceps tendon strain.   The patient's treatments since the most recent visit have comprised of bracing, PT.   The patient has had moderate relief of the chief complaint.        PMH:  Past Medical History:   Diagnosis Date    Cancer (HCC)     ovarian     Hypertension     with pregnancy    Ill-defined condition     JAIME 1 with last pap    Ill-defined condition     MS       PSxHx:  No past surgical history on file.    Meds:    Current Outpatient Medications:     diclofenac sodium (VOLTAREN) 1 % GEL, Apply 4 g topically 4 times daily, Disp: 100 g, Rfl: 3    acetaminophen (TYLENOL) 500 MG tablet, Take 2 tablets by mouth every 6 hours as needed for Pain, Disp: 30 tablet, Rfl: 0    apixaban (ELIQUIS) 5 MG TABS tablet, Take 1 tablet by mouth 2 times daily, Disp: 180 tablet, Rfl: 0    albuterol sulfate HFA (VENTOLIN HFA) 108 (90 Base) MCG/ACT inhaler, Inhale 2 puffs into the lungs 4 times daily as needed for Wheezing, Disp: 18 g, Rfl: 3    ferrous sulfate (FE TABS 325) 325 (65 Fe) MG EC tablet, Take 1 tablet by mouth daily (with breakfast), Disp: 90 tablet, Rfl: 3    metoprolol succinate (TOPROL XL) 25 MG extended release tablet, Take 1 tablet by mouth daily, Disp: 30 tablet, Rfl: 3    All:  Allergies   Allergen Reactions    Shellfish Allergy Swelling       Social Hx:  Social History     Socioeconomic History    Marital status: Single     Spouse name: None    Number of children: None    Years of education: None    Highest education level: None   Tobacco Use    Smoking status: Some Days     Current packs/day: 0.25     Types: Cigarettes    Smokeless tobacco: Former   Vaping Use    Vaping status: Never Used   Substance and Sexual Activity    Alcohol use: Yes     Alcohol/week: 3.0 standard drinks of alcohol    Drug use: Not Currently    Sexual

## 2025-02-04 ENCOUNTER — OFFICE VISIT (OUTPATIENT)
Facility: CLINIC | Age: 35
End: 2025-02-04
Payer: COMMERCIAL

## 2025-02-04 VITALS
RESPIRATION RATE: 18 BRPM | HEIGHT: 65 IN | TEMPERATURE: 97.3 F | SYSTOLIC BLOOD PRESSURE: 144 MMHG | DIASTOLIC BLOOD PRESSURE: 92 MMHG | BODY MASS INDEX: 40.65 KG/M2 | HEART RATE: 91 BPM | WEIGHT: 244 LBS | OXYGEN SATURATION: 98 %

## 2025-02-04 DIAGNOSIS — L98.9 LEG SORE: Primary | ICD-10-CM

## 2025-02-04 PROCEDURE — 99213 OFFICE O/P EST LOW 20 MIN: CPT | Performed by: INTERNAL MEDICINE

## 2025-02-04 RX ORDER — MUPIROCIN 20 MG/G
OINTMENT TOPICAL
Qty: 30 G | Refills: 0 | Status: SHIPPED | OUTPATIENT
Start: 2025-02-04 | End: 2025-02-11

## 2025-02-04 ASSESSMENT — ANXIETY QUESTIONNAIRES
5. BEING SO RESTLESS THAT IT IS HARD TO SIT STILL: NOT AT ALL
3. WORRYING TOO MUCH ABOUT DIFFERENT THINGS: NOT AT ALL
GAD7 TOTAL SCORE: 0
4. TROUBLE RELAXING: NOT AT ALL
6. BECOMING EASILY ANNOYED OR IRRITABLE: NOT AT ALL
7. FEELING AFRAID AS IF SOMETHING AWFUL MIGHT HAPPEN: NOT AT ALL
2. NOT BEING ABLE TO STOP OR CONTROL WORRYING: NOT AT ALL
IF YOU CHECKED OFF ANY PROBLEMS ON THIS QUESTIONNAIRE, HOW DIFFICULT HAVE THESE PROBLEMS MADE IT FOR YOU TO DO YOUR WORK, TAKE CARE OF THINGS AT HOME, OR GET ALONG WITH OTHER PEOPLE: NOT DIFFICULT AT ALL
1. FEELING NERVOUS, ANXIOUS, OR ON EDGE: NOT AT ALL

## 2025-02-04 ASSESSMENT — PATIENT HEALTH QUESTIONNAIRE - PHQ9
SUM OF ALL RESPONSES TO PHQ9 QUESTIONS 1 & 2: 0
SUM OF ALL RESPONSES TO PHQ QUESTIONS 1-9: 0
2. FEELING DOWN, DEPRESSED OR HOPELESS: NOT AT ALL
1. LITTLE INTEREST OR PLEASURE IN DOING THINGS: NOT AT ALL

## 2025-02-04 NOTE — PROGRESS NOTES
Chief Complaint   Patient presents with    Wound Infection     \"Have you been to the ER, urgent care clinic since your last visit?  Hospitalized since your last visit?\"    NO    “Have you seen or consulted any other health care providers outside our system since your last visit?”    NO     “Have you had a pap smear?”    NO    Date of last Cervical Cancer screen (HPV or PAP): 5/7/2021            HIPPA confirmed by two patient identifiers.

## 2025-02-04 NOTE — PROGRESS NOTES
Diana Snell is a 32 y.o. female and presents with   Chief Complaint   Patient presents with    Wound Infection      .  Subjective:    Last appt 7/2023. Pt arrives 15 minutes late for appt.    Pt  w c/o 3 sores on her legs and abdominal area x weeks. First started as a red papule and opened      PMH:   H/o anemia- S/p IUD    Lab Results   Component Value Date    WBC 14.6 (H) 06/21/2024    HGB 8.4 (L) 06/21/2024    HCT 28.9 (L) 06/21/2024    MCV 77.1 (L) 06/21/2024     06/21/2024         Recurrent DVT, popliteal- dx'ed 7/14/2021    -pt has a h/o non-compliance w w AC    -lifelong AC as confirmed by heme       H/o elevated blood pressure-currently OFF metroprolol  BP Readings from Last 3 Encounters:   02/04/25 (!) 144/92   12/05/24 125/85   11/12/24 (!) 140/89       TOSHIA on cpap, uses \"sometimes\"     Obesity-  Wt Readings from Last 3 Encounters:   02/04/25 110.7 kg (244 lb)   12/05/24 113.7 kg (250 lb 9.6 oz)   11/12/24 115.1 kg (253 lb 12.8 oz)       Scoliosis-   \"Arrythmia\"    Review of Systems  Review of systems (12) negative, except noted above.        Current Outpatient Medications:     mupirocin (BACTROBAN) 2 % ointment, Apply topically 3 times daily., Disp: 30 g, Rfl: 0    diclofenac sodium (VOLTAREN) 1 % GEL, Apply 4 g topically 4 times daily, Disp: 100 g, Rfl: 3    acetaminophen (TYLENOL) 500 MG tablet, Take 2 tablets by mouth every 6 hours as needed for Pain, Disp: 30 tablet, Rfl: 0    apixaban (ELIQUIS) 5 MG TABS tablet, Take 1 tablet by mouth 2 times daily, Disp: 180 tablet, Rfl: 0    albuterol sulfate HFA (VENTOLIN HFA) 108 (90 Base) MCG/ACT inhaler, Inhale 2 puffs into the lungs 4 times daily as needed for Wheezing, Disp: 18 g, Rfl: 3    ferrous sulfate (FE TABS 325) 325 (65 Fe) MG EC tablet, Take 1 tablet by mouth daily (with breakfast), Disp: 90 tablet, Rfl: 3    metoprolol succinate (TOPROL XL) 25 MG extended release tablet, Take 1 tablet by mouth daily, Disp: 30 tablet, Rfl: